# Patient Record
Sex: FEMALE | Race: OTHER | Employment: OTHER | URBAN - METROPOLITAN AREA
[De-identification: names, ages, dates, MRNs, and addresses within clinical notes are randomized per-mention and may not be internally consistent; named-entity substitution may affect disease eponyms.]

---

## 2017-01-19 RX ORDER — INSULIN ASPART 100 [IU]/ML
INJECTION, SOLUTION INTRAVENOUS; SUBCUTANEOUS
Qty: 1 PEN | Refills: 3 | Status: SHIPPED | OUTPATIENT
Start: 2017-01-19 | End: 2017-01-19

## 2017-01-19 NOTE — TELEPHONE ENCOUNTER
Pharm called. She states that a one month supply is 5 pens (that's what pt usually gets) will resend.

## 2017-01-27 RX ORDER — FENOFIBRATE 145 MG/1
TABLET, COATED ORAL
Qty: 30 TABLET | Refills: 2 | Status: SHIPPED | OUTPATIENT
Start: 2017-01-27 | End: 2017-05-17

## 2017-02-19 DIAGNOSIS — E11.9 TYPE 2 DIABETES MELLITUS WITHOUT COMPLICATION (HCC): ICD-10-CM

## 2017-02-24 RX ORDER — GLIMEPIRIDE 1 MG/1
TABLET ORAL
Qty: 90 TABLET | Refills: 0 | Status: SHIPPED | OUTPATIENT
Start: 2017-02-24 | End: 2017-05-22

## 2017-02-24 NOTE — TELEPHONE ENCOUNTER
Please advise on RF  Protocol Criteria:  · Appointment scheduled in the past 6 months or the next 3 months  · A1C < 7.5 in the past 6 months  · Creatinine in the past 12 months  · Creatinine result < 1.5   Recent Visits       Provider Department [facility]

## 2017-02-24 NOTE — TELEPHONE ENCOUNTER
Please advise on RF  Protocol Criteria:  · Appointment scheduled in the past 6 months or in the next 3 months  · BMP or CMP in the past 12 months  · Creatinine result < 2  Recent Visits       Provider Department Primary Dx    7 months ago Katya Dominguez,

## 2017-02-25 RX ORDER — NAPROXEN SODIUM 220 MG
TABLET ORAL
Qty: 100 EACH | Refills: 2 | Status: SHIPPED | OUTPATIENT
Start: 2017-02-25 | End: 2018-01-19

## 2017-02-25 NOTE — TELEPHONE ENCOUNTER
From: Maciel Schulte  To:  Hipolito Talamantes MD  Sent: 2/19/2017 9:38 AM CST  Subject: Medication Renewal Request    Original authorizing provider: MD Maciel Mc would like a refill of the following medications:  Insulin Syringe

## 2017-02-26 RX ORDER — LISINOPRIL 30 MG/1
TABLET ORAL
Qty: 90 TABLET | Refills: 4 | Status: SHIPPED | OUTPATIENT
Start: 2017-02-26 | End: 2018-05-12

## 2017-03-08 RX ORDER — SIMVASTATIN 20 MG
TABLET ORAL
Qty: 30 TABLET | Refills: 0 | Status: SHIPPED | OUTPATIENT
Start: 2017-03-08 | End: 2017-04-13

## 2017-03-08 NOTE — TELEPHONE ENCOUNTER
Appt needed    Cholesterol Medications  Protocol Criteria:  · Appointment scheduled in the past 12 months or in the next 3 months  · ALT & LDL on file in the past 12 months  · ALT result < 80  · LDL result <130   Recent Visits       Provider Department [facility]

## 2017-03-10 RX ORDER — SIMVASTATIN 20 MG
20 TABLET ORAL
Qty: 90 TABLET | Refills: 0 | Status: CANCELLED | OUTPATIENT
Start: 2017-03-10

## 2017-03-10 RX ORDER — AMLODIPINE BESYLATE 5 MG/1
5 TABLET ORAL
Qty: 90 TABLET | Refills: 0 | Status: CANCELLED | OUTPATIENT
Start: 2017-03-10

## 2017-03-10 RX ORDER — AMLODIPINE BESYLATE 5 MG/1
5 TABLET ORAL
Qty: 30 TABLET | Refills: 0 | Status: SHIPPED | OUTPATIENT
Start: 2017-03-10 | End: 2017-03-17

## 2017-03-10 NOTE — TELEPHONE ENCOUNTER
From: Geo Gustafson  To:  Radha Lam MD  Sent: 3/8/2017 10:37 AM CST  Subject: Medication Renewal Request    Original authorizing provider: MD Geo Souza would like a refill of the following medications:  SIMVASTATIN 20 M

## 2017-03-10 NOTE — TELEPHONE ENCOUNTER
Hypertensive Medications Refill protocol failed because the patient did not meet the protocol criteria. 30 day supply of medication sent to pharmacy per protocol. Due for OV and labs.    Protocol Criteria:  · Appointment scheduled in the past 6 months or in

## 2017-03-13 ENCOUNTER — TELEPHONE (OUTPATIENT)
Dept: FAMILY MEDICINE CLINIC | Facility: CLINIC | Age: 62
End: 2017-03-13

## 2017-03-13 DIAGNOSIS — Z00.00 ROUTINE MEDICAL EXAM: Primary | ICD-10-CM

## 2017-03-13 NOTE — TELEPHONE ENCOUNTER
Pt has physical for 4/3 and wanted to know since she is diabetic does she need orders?  Please advise

## 2017-03-14 ENCOUNTER — PATIENT MESSAGE (OUTPATIENT)
Dept: FAMILY MEDICINE CLINIC | Facility: CLINIC | Age: 62
End: 2017-03-14

## 2017-03-14 DIAGNOSIS — I10 ESSENTIAL HYPERTENSION: Primary | ICD-10-CM

## 2017-03-14 RX ORDER — AMLODIPINE BESYLATE 5 MG/1
5 TABLET ORAL
Qty: 90 TABLET | Refills: 0 | Status: CANCELLED | OUTPATIENT
Start: 2017-03-14

## 2017-03-14 NOTE — TELEPHONE ENCOUNTER
From: Shanda Parents  To:  Frederick Soriano MD  Sent: 3/8/2017 12:44 PM CST  Subject: Medication Renewal Request    Original authorizing provider: MD Shanda Bustillos Parents would like a refill of the following medications:  AMLODIPINE BESYL

## 2017-03-17 RX ORDER — AMLODIPINE BESYLATE 5 MG/1
5 TABLET ORAL
Qty: 90 TABLET | Refills: 0 | OUTPATIENT
Start: 2017-03-17 | End: 2017-04-03

## 2017-03-17 NOTE — TELEPHONE ENCOUNTER
See [employer] message for pt. Called [facility] pharmacy and issue is pt requesting 90 day refill on amlodipine 5 mg instead of 30 tablets. 3/10/17 amlodipine 5 mg edited for 90 tablets + 0 refill. Next appt with Dr Joselyn Zee.  Mirza Santana 4/3/17 @ 9:45am for physical.  Mychart me

## 2017-03-21 RX ORDER — AMLODIPINE BESYLATE 5 MG/1
TABLET ORAL
Qty: 90 TABLET | Refills: 0 | Status: SHIPPED | OUTPATIENT
Start: 2017-03-21 | End: 2017-07-14

## 2017-04-01 ENCOUNTER — LAB ENCOUNTER (OUTPATIENT)
Dept: LAB | Age: 62
End: 2017-04-01
Attending: FAMILY MEDICINE
Payer: COMMERCIAL

## 2017-04-01 DIAGNOSIS — Z00.00 ROUTINE MEDICAL EXAM: ICD-10-CM

## 2017-04-01 PROCEDURE — 84443 ASSAY THYROID STIM HORMONE: CPT

## 2017-04-01 PROCEDURE — 83036 HEMOGLOBIN GLYCOSYLATED A1C: CPT

## 2017-04-01 PROCEDURE — 85025 COMPLETE CBC W/AUTO DIFF WBC: CPT

## 2017-04-01 PROCEDURE — 82570 ASSAY OF URINE CREATININE: CPT

## 2017-04-01 PROCEDURE — 82043 UR ALBUMIN QUANTITATIVE: CPT

## 2017-04-01 PROCEDURE — 80061 LIPID PANEL: CPT

## 2017-04-01 PROCEDURE — 36415 COLL VENOUS BLD VENIPUNCTURE: CPT

## 2017-04-01 PROCEDURE — 80053 COMPREHEN METABOLIC PANEL: CPT

## 2017-04-03 ENCOUNTER — OFFICE VISIT (OUTPATIENT)
Dept: FAMILY MEDICINE CLINIC | Facility: CLINIC | Age: 62
End: 2017-04-03

## 2017-04-03 VITALS
HEIGHT: 60 IN | WEIGHT: 222.81 LBS | DIASTOLIC BLOOD PRESSURE: 70 MMHG | HEART RATE: 71 BPM | SYSTOLIC BLOOD PRESSURE: 122 MMHG | BODY MASS INDEX: 43.74 KG/M2

## 2017-04-03 DIAGNOSIS — E78.2 MIXED HYPERLIPIDEMIA: ICD-10-CM

## 2017-04-03 DIAGNOSIS — Z79.4 TYPE 2 DIABETES MELLITUS WITHOUT COMPLICATION, WITH LONG-TERM CURRENT USE OF INSULIN (HCC): ICD-10-CM

## 2017-04-03 DIAGNOSIS — H91.93 BILATERAL HEARING LOSS, UNSPECIFIED HEARING LOSS TYPE: ICD-10-CM

## 2017-04-03 DIAGNOSIS — E11.9 TYPE 2 DIABETES MELLITUS WITHOUT COMPLICATION, WITH LONG-TERM CURRENT USE OF INSULIN (HCC): ICD-10-CM

## 2017-04-03 DIAGNOSIS — I10 ESSENTIAL HYPERTENSION, BENIGN: ICD-10-CM

## 2017-04-03 DIAGNOSIS — Z00.00 ROUTINE MEDICAL EXAM: Primary | ICD-10-CM

## 2017-04-03 PROCEDURE — 99396 PREV VISIT EST AGE 40-64: CPT | Performed by: FAMILY MEDICINE

## 2017-04-03 PROCEDURE — 99213 OFFICE O/P EST LOW 20 MIN: CPT | Performed by: FAMILY MEDICINE
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
## 2017-04-07 ENCOUNTER — APPOINTMENT (OUTPATIENT)
Dept: LAB | Age: 62
End: 2017-04-07
Attending: FAMILY MEDICINE
Payer: COMMERCIAL

## 2017-04-07 DIAGNOSIS — Z00.00 ROUTINE MEDICAL EXAM: ICD-10-CM

## 2017-04-07 PROCEDURE — 82274 ASSAY TEST FOR BLOOD FECAL: CPT

## 2017-04-07 RX ORDER — PANTOPRAZOLE SODIUM 40 MG/1
TABLET, DELAYED RELEASE ORAL
Qty: 90 TABLET | Refills: 0 | Status: SHIPPED | OUTPATIENT
Start: 2017-04-07 | End: 2017-07-05

## 2017-04-10 DIAGNOSIS — Z12.11 SCREEN FOR COLON CANCER: Primary | ICD-10-CM

## 2017-04-11 NOTE — PROGRESS NOTES
Quick Note:    Stool sample was positive for blood so I do recommend you see Gastroenterologist for screening colonoscopy.  I have placed a referral in your chart. - Dr. May Glynn  ______

## 2017-04-17 RX ORDER — AMLODIPINE BESYLATE 5 MG/1
TABLET ORAL
Qty: 30 TABLET | Refills: 0 | OUTPATIENT
Start: 2017-04-17

## 2017-04-17 RX ORDER — SIMVASTATIN 20 MG
TABLET ORAL
Qty: 90 TABLET | Refills: 0 | Status: SHIPPED | OUTPATIENT
Start: 2017-04-17 | End: 2017-07-14

## 2017-04-17 NOTE — TELEPHONE ENCOUNTER
Diabetes Medications; REFILLED PER PROTOCOL.   Protocol Criteria:  · Appointment scheduled in the past 6 months or the next 3 months  · A1C < 7.5 in the past 6 months  · Creatinine in the past 12 months  · Creatinine result < 1.5   Recent Visits       Provi

## 2017-05-18 RX ORDER — FENOFIBRATE 145 MG/1
TABLET, COATED ORAL
Qty: 90 TABLET | Refills: 0 | Status: SHIPPED | OUTPATIENT
Start: 2017-05-18 | End: 2017-08-20

## 2017-05-25 ENCOUNTER — PATIENT MESSAGE (OUTPATIENT)
Dept: FAMILY MEDICINE CLINIC | Facility: CLINIC | Age: 62
End: 2017-05-25

## 2017-05-25 RX ORDER — GLIMEPIRIDE 1 MG/1
TABLET ORAL
Qty: 90 TABLET | Refills: 0 | Status: SHIPPED | OUTPATIENT
Start: 2017-05-25 | End: 2017-08-21

## 2017-05-25 NOTE — TELEPHONE ENCOUNTER
Refilled per written protocol.     Diabetes Medications  Protocol Criteria:  · Appointment scheduled in the past 6 months or the next 3 months  · A1C < 7.5 in the past 6 months  · Creatinine in the past 12 months  · Creatinine result < 1.5   Recent Visits

## 2017-06-03 RX ORDER — GLIMEPIRIDE 1 MG/1
TABLET ORAL
Qty: 90 TABLET | Refills: 0 | Status: CANCELLED | OUTPATIENT
Start: 2017-06-03

## 2017-06-03 NOTE — TELEPHONE ENCOUNTER
From: Martha Mcgraw  To:  Yoana Neves MD  Sent: 5/25/2017 10:39 AM CDT  Subject: Medication Renewal Request    Original authorizing provider: MD Martha Desai would like a refill of the following medications:  GLIMEPIRIDE 1 M

## 2017-06-21 RX ORDER — INSULIN GLARGINE 100 [IU]/ML
INJECTION, SOLUTION SUBCUTANEOUS
Qty: 20 ML | Refills: 2 | Status: SHIPPED | OUTPATIENT
Start: 2017-06-21 | End: 2017-09-12

## 2017-06-27 DIAGNOSIS — E11.9 TYPE 2 DIABETES MELLITUS WITHOUT COMPLICATION, WITHOUT LONG-TERM CURRENT USE OF INSULIN (HCC): ICD-10-CM

## 2017-06-29 NOTE — TELEPHONE ENCOUNTER
From: Gonsalo Carlson  Sent: 6/27/2017 8:36 PM CDT  Subject: Medication Renewal Request    Gonsalo Carlson would like a refill of the following medications:  insulin aspart (NOVOLOG FLEXPEN) 100 UNIT/ML Subcutaneous Solution Pen-injector Paul Simmons

## 2017-06-29 NOTE — TELEPHONE ENCOUNTER
Diabetes Medications: Refilled per protocol    Protocol Criteria:  · Appointment scheduled in the past 6 months or the next 3 months  · A1C < 7.5 in the past 6 months  · Creatinine in the past 12 months  · Creatinine result < 1.5   Recent Outpatient Visits

## 2017-07-06 RX ORDER — PANTOPRAZOLE SODIUM 40 MG/1
TABLET, DELAYED RELEASE ORAL
Qty: 90 TABLET | Refills: 4 | Status: SHIPPED | OUTPATIENT
Start: 2017-07-06 | End: 2018-07-19

## 2017-07-18 RX ORDER — AMLODIPINE BESYLATE 5 MG/1
TABLET ORAL
Qty: 90 TABLET | Refills: 0 | Status: SHIPPED | OUTPATIENT
Start: 2017-07-18 | End: 2017-10-14

## 2017-07-18 RX ORDER — SIMVASTATIN 20 MG
TABLET ORAL
Qty: 90 TABLET | Refills: 0 | Status: SHIPPED | OUTPATIENT
Start: 2017-07-18 | End: 2017-10-14

## 2017-07-18 NOTE — TELEPHONE ENCOUNTER
Chart reviewed. Refills sent per Triage Dept protocol. Hypertensive , Chol, DM Medications, per lab notes,no changes in medications. Patient to continue present management.   Protocol Criteria:  · Appointment scheduled in the past 6 months or in the nex

## 2017-08-23 RX ORDER — GLIMEPIRIDE 1 MG/1
TABLET ORAL
Qty: 90 TABLET | Refills: 0 | Status: CANCELLED
Start: 2017-08-23

## 2017-08-23 RX ORDER — FENOFIBRATE 145 MG/1
TABLET, COATED ORAL
Qty: 90 TABLET | Refills: 0 | Status: SHIPPED | OUTPATIENT
Start: 2017-08-23 | End: 2017-10-11

## 2017-08-24 RX ORDER — GLIMEPIRIDE 1 MG/1
TABLET ORAL
Qty: 90 TABLET | Refills: 0 | Status: SHIPPED | OUTPATIENT
Start: 2017-08-24 | End: 2017-10-21

## 2017-08-24 NOTE — TELEPHONE ENCOUNTER
Refilled per protocol.    Cholesterol Medications  Protocol Criteria:  · Appointment scheduled in the past 12 months or in the next 3 months  · ALT & LDL on file in the past 12 months  · ALT result < 80  · LDL result <130   Recent Outpatient Visits

## 2017-08-24 NOTE — TELEPHONE ENCOUNTER
Chart reviewed, glimepiride 1 mg daily refilled for 3 months.     Diabetes Medications  Protocol Criteria:  · Appointment scheduled in the past 6 months or the next 3 months  · A1C < 7.5 in the past 6 months  · Creatinine in the past 12 months  · Creatinine

## 2017-08-24 NOTE — TELEPHONE ENCOUNTER
From: Lisa Drake  Sent: 8/23/2017 10:46 AM CDT  Subject: Medication Renewal Request    Lisa Drake would like a refill of the following medications:  GLIMEPIRIDE 1 MG Oral Tab Yasmine Avilez MD]    Preferred pharmacy: [facility] [id number] [place] [facility]

## 2017-09-12 RX ORDER — INSULIN GLARGINE 100 [IU]/ML
INJECTION, SOLUTION SUBCUTANEOUS
Qty: 20 ML | Refills: 2 | Status: SHIPPED | OUTPATIENT
Start: 2017-09-12 | End: 2017-10-21

## 2017-09-20 ENCOUNTER — OFFICE VISIT (OUTPATIENT)
Dept: FAMILY MEDICINE CLINIC | Facility: CLINIC | Age: 62
End: 2017-09-20

## 2017-09-20 VITALS
DIASTOLIC BLOOD PRESSURE: 83 MMHG | SYSTOLIC BLOOD PRESSURE: 134 MMHG | HEART RATE: 75 BPM | BODY MASS INDEX: 43 KG/M2 | WEIGHT: 220.38 LBS

## 2017-09-20 DIAGNOSIS — Z79.4 TYPE 2 DIABETES MELLITUS WITHOUT COMPLICATION, WITH LONG-TERM CURRENT USE OF INSULIN (HCC): Primary | ICD-10-CM

## 2017-09-20 DIAGNOSIS — I10 ESSENTIAL HYPERTENSION, BENIGN: ICD-10-CM

## 2017-09-20 DIAGNOSIS — E11.9 TYPE 2 DIABETES MELLITUS WITHOUT COMPLICATION, WITH LONG-TERM CURRENT USE OF INSULIN (HCC): Primary | ICD-10-CM

## 2017-09-20 DIAGNOSIS — M62.838 NECK MUSCLE SPASM: ICD-10-CM

## 2017-09-20 PROCEDURE — 99212 OFFICE O/P EST SF 10 MIN: CPT | Performed by: FAMILY MEDICINE

## 2017-09-20 PROCEDURE — 90471 IMMUNIZATION ADMIN: CPT | Performed by: FAMILY MEDICINE

## 2017-09-20 PROCEDURE — 99213 OFFICE O/P EST LOW 20 MIN: CPT | Performed by: FAMILY MEDICINE

## 2017-09-20 PROCEDURE — 90686 IIV4 VACC NO PRSV 0.5 ML IM: CPT | Performed by: FAMILY MEDICINE

## 2017-09-20 RX ORDER — NAPROXEN 500 MG/1
500 TABLET ORAL 2 TIMES DAILY WITH MEALS
Qty: 20 TABLET | Refills: 0 | Status: SHIPPED | OUTPATIENT
Start: 2017-09-20 | End: 2017-10-04

## 2017-09-20 RX ORDER — LIDOCAINE 50 MG/G
1 PATCH TOPICAL EVERY 24 HOURS
Qty: 10 PATCH | Refills: 0 | Status: SHIPPED | OUTPATIENT
Start: 2017-09-20 | End: 2017-10-25 | Stop reason: ALTCHOICE

## 2017-09-20 RX ORDER — CYCLOBENZAPRINE HCL 5 MG
5 TABLET ORAL 3 TIMES DAILY PRN
Qty: 20 TABLET | Refills: 0 | Status: SHIPPED | OUTPATIENT
Start: 2017-09-20 | End: 2017-10-04

## 2017-09-20 NOTE — PROGRESS NOTES
Alivia Sharif is a 57year old female. Patient presents with:  Neck Pain    HPI:   Reports injury to neck over 30 years ago when fell down stairs. Had horrible pain at the time. Reports for past 2 weeks having a lot of stiffness and unable to move it. NOSTRIL DAILY Disp: 1 Inhaler Rfl: 4   Meclizine HCl (ANTIVERT) 12.5 MG Oral Tab Take 1 tablet (12.5 mg total) by mouth 3 (three) times daily as needed.  Disp: 90 tablet Rfl: 1   VITAMIN D3 SUPER STRENGTH 2000 UNITS Oral Tab TAKE ONE TABLET BY MOUTH ONE KARLI use of insulin ([facility] [address]      2. Essential hypertension, benign      3. Neck muscle spasm  Flexeril in  Evenings. lidoderm patches.   - PHYSICAL THERAPY - INTERNAL          The patient indicates understanding of these issues and agrees to the plan.       Dale Aldridge

## 2017-10-02 ENCOUNTER — NURSE TRIAGE (OUTPATIENT)
Dept: OTHER | Age: 62
End: 2017-10-02

## 2017-10-02 PROBLEM — H26.9 INCIPIENT CATARACT OF LEFT EYE: Status: ACTIVE | Noted: 2017-10-02

## 2017-10-02 PROBLEM — E11.9 TYPE 2 DIABETES MELLITUS WITHOUT COMPLICATION, WITHOUT LONG-TERM CURRENT USE OF INSULIN (HCC): Status: ACTIVE | Noted: 2017-10-02

## 2017-10-02 PROBLEM — H04.123 DRY EYE SYNDROME OF BILATERAL LACRIMAL GLANDS: Status: ACTIVE | Noted: 2017-10-02

## 2017-10-02 PROBLEM — H10.13 ALLERGIC CONJUNCTIVITIS OF BOTH EYES: Status: ACTIVE | Noted: 2017-10-02

## 2017-10-02 PROBLEM — H40.033 ANATOMICAL NARROW ANGLE OF BOTH EYES: Status: ACTIVE | Noted: 2017-10-02

## 2017-10-02 NOTE — TELEPHONE ENCOUNTER
Action Requested: Summary for Provider     []  Critical Lab, Recommendations Needed  [x] Need Additional Advice  []   FYI    []   Need Orders  [] Need Medications Sent to Pharmacy  []  Other     SUMMARY: requesting Appt tomorrow-Only wants to see You -thin

## 2017-10-04 ENCOUNTER — OFFICE VISIT (OUTPATIENT)
Dept: FAMILY MEDICINE CLINIC | Facility: CLINIC | Age: 62
End: 2017-10-04

## 2017-10-04 VITALS
HEART RATE: 74 BPM | WEIGHT: 216.38 LBS | SYSTOLIC BLOOD PRESSURE: 132 MMHG | TEMPERATURE: 100 F | BODY MASS INDEX: 42 KG/M2 | DIASTOLIC BLOOD PRESSURE: 84 MMHG

## 2017-10-04 DIAGNOSIS — K57.92 DIVERTICULITIS OF INTESTINE WITHOUT BLEEDING, UNSPECIFIED COMPLICATION STATUS, UNSPECIFIED PART OF INTESTINAL TRACT: Primary | ICD-10-CM

## 2017-10-04 PROCEDURE — 99214 OFFICE O/P EST MOD 30 MIN: CPT | Performed by: FAMILY MEDICINE

## 2017-10-04 PROCEDURE — 99212 OFFICE O/P EST SF 10 MIN: CPT | Performed by: FAMILY MEDICINE

## 2017-10-04 RX ORDER — MOXIFLOXACIN HYDROCHLORIDE 400 MG/1
400 TABLET ORAL DAILY
Qty: 10 TABLET | Refills: 0 | Status: SHIPPED | OUTPATIENT
Start: 2017-10-04 | End: 2017-10-16

## 2017-10-04 NOTE — PROGRESS NOTES
Shanda Chilel is a 57year old female. Patient presents with:  Abdominal Pain  Bloating    HPI:   3 days with right lower abdominal pain. Concerned it could be diverticulitis - last time she had a reaction was 3 years ago.   No nausea but has been eating SPRAYS IN EACH NOSTRIL DAILY Disp: 1 Inhaler Rfl: 4   Meclizine HCl (ANTIVERT) 12.5 MG Oral Tab Take 1 tablet (12.5 mg total) by mouth 3 (three) times daily as needed.  Disp: 90 tablet Rfl: 1   VITAMIN D3 SUPER STRENGTH 2000 UNITS Oral Tab TAKE ONE TABLET B murmur  GI: good BS's,no masses, HSM pos tenderness in right mid abdomen near umbilicus - no tenderness in right lower quadrant. No rebound or guarding   EXTREMITIES: no cyanosis, clubbing or edema    ASSESSMENT AND PLAN:   1.  Diverticulitis of intestine w

## 2017-10-13 ENCOUNTER — NURSE TRIAGE (OUTPATIENT)
Dept: OTHER | Age: 62
End: 2017-10-13

## 2017-10-13 RX ORDER — FENOFIBRATE 145 MG/1
145 TABLET, COATED ORAL
Qty: 90 TABLET | Refills: 0 | Status: SHIPPED | OUTPATIENT
Start: 2017-10-13 | End: 2017-10-14

## 2017-10-13 NOTE — TELEPHONE ENCOUNTER
From: Elizabeth James  Sent: 10/11/2017 12:02 PM CDT  Subject: Medication Renewal Request    Elizabeth James would like a refill of the following medications:     FENOFIBRATE 145 MG Oral Tab Revonda Kendal Nick Guerrero MD]    Preferred pharmacy: [address]

## 2017-10-13 NOTE — TELEPHONE ENCOUNTER
Dr. Fatemeh Mohr for Dr. Fritz Vincent (out of office) please advise on Rx request thank you.    Action Requested: Summary for Provider     []  Critical Lab, Recommendations Needed  [x] Need Additional Advice  []   FYI    []   Need Orders  [] Need Medications Sent to Phar

## 2017-10-13 NOTE — TELEPHONE ENCOUNTER
No, I do not feel comfortable adding another antibiotic. Go ahead and send this to Dr. Da Monge to see if she will get her worked in or handle this. Cheri Durant

## 2017-10-13 NOTE — TELEPHONE ENCOUNTER
Cholesterol Medications  Protocol Criteria:  · Appointment scheduled in the past 12 months or in the next 3 months  · ALT & LDL on file in the past 12 months  · ALT result < 80  · LDL result <130   Recent Outpatient Visits            1 week ago Yennifer Wong

## 2017-10-13 NOTE — TELEPHONE ENCOUNTER
Signed Prescriptions Disp Refills    Fenofibrate 145 MG Oral Tab 90 tablet 0      Sig: Take 1 tablet (145 mg total) by mouth once daily.         Authorizing Provider: Rebecca Presley        Ordering User: Kailee Dang           Refill approved per dinah

## 2017-10-13 NOTE — TELEPHONE ENCOUNTER
Pt informed of VS' response below and pt willing to wait for response from [facility] Monday. I noted there was a slot available (RN approval) with [facility] for 10/16 at 11:30 am and pt will take it.  If that time does not work for her she will call back to let us know t

## 2017-10-14 RX ORDER — SIMVASTATIN 20 MG
TABLET ORAL
Qty: 90 TABLET | Refills: 1 | Status: SHIPPED | OUTPATIENT
Start: 2017-10-14 | End: 2018-04-22

## 2017-10-14 RX ORDER — AMLODIPINE BESYLATE 5 MG/1
TABLET ORAL
Qty: 90 TABLET | Refills: 1 | Status: SHIPPED | OUTPATIENT
Start: 2017-10-14 | End: 2018-04-17

## 2017-10-14 RX ORDER — FENOFIBRATE 145 MG/1
TABLET, COATED ORAL
Qty: 90 TABLET | Refills: 0 | Status: SHIPPED | OUTPATIENT
Start: 2017-10-14 | End: 2018-01-04

## 2017-10-14 NOTE — TELEPHONE ENCOUNTER
Signed Prescriptions Disp Refills    AMLODIPINE BESYLATE 5 MG Oral Tab 90 tablet 1      Sig: TAKE ONE TABLET BY MOUTH ONE TIME DAILY        Authorizing Provider: Macrela DAVID        Ordering User: Bhavesh Garcia      SIMVASTATIN 20 MG Oral Tab 90 ta 150 Gee Bowden  colonscopy          Lab Results  Component Value Date   A1C 6.9 (H) 04/01/2017       Lab Results  Component Value Date   CREATSERUM 1.00 04/01/2017         Hypertensive Medications  Protocol Criteria:  · Appointment s GLOBULIN 2.7 04/01/2017   AGRATIO 1.3 07/16/2016   ANIONGAP 6 04/01/2017   OSMOCALC 293 04/01/2017           Cholesterol Medications  Protocol Criteria:  · Appointment scheduled in the past 12 months or in the next 3 months  · ALT & LDL on file in the pa

## 2017-10-14 NOTE — TELEPHONE ENCOUNTER
Pt to start probiotics like culturelle or align otc - it may be the side effect from antibiotic use. If pain returns, will send for CT scan.  See me on Monday

## 2017-10-16 ENCOUNTER — TELEPHONE (OUTPATIENT)
Dept: OTHER | Age: 62
End: 2017-10-16

## 2017-10-16 ENCOUNTER — HOSPITAL ENCOUNTER (OUTPATIENT)
Dept: CT IMAGING | Age: 62
Discharge: HOME | End: 2017-10-16
Attending: FAMILY MEDICINE
Payer: COMMERCIAL

## 2017-10-16 ENCOUNTER — OFFICE VISIT (OUTPATIENT)
Dept: FAMILY MEDICINE CLINIC | Facility: CLINIC | Age: 62
End: 2017-10-16

## 2017-10-16 VITALS
HEART RATE: 70 BPM | DIASTOLIC BLOOD PRESSURE: 80 MMHG | SYSTOLIC BLOOD PRESSURE: 121 MMHG | BODY MASS INDEX: 42.8 KG/M2 | HEIGHT: 60 IN | WEIGHT: 218 LBS

## 2017-10-16 DIAGNOSIS — Z87.19 HISTORY OF DIVERTICULITIS OF COLON: ICD-10-CM

## 2017-10-16 DIAGNOSIS — N94.89 ADNEXAL MASS: Primary | ICD-10-CM

## 2017-10-16 DIAGNOSIS — R10.31 RIGHT LOWER QUADRANT ABDOMINAL PAIN: Primary | ICD-10-CM

## 2017-10-16 DIAGNOSIS — R10.31 RIGHT LOWER QUADRANT ABDOMINAL PAIN: ICD-10-CM

## 2017-10-16 PROCEDURE — 99214 OFFICE O/P EST MOD 30 MIN: CPT | Performed by: FAMILY MEDICINE

## 2017-10-16 PROCEDURE — 99212 OFFICE O/P EST SF 10 MIN: CPT | Performed by: FAMILY MEDICINE

## 2017-10-16 PROCEDURE — 74177 CT ABD & PELVIS W/CONTRAST: CPT | Performed by: FAMILY MEDICINE

## 2017-10-16 NOTE — PROGRESS NOTES
Lisa Drake is a 57year old female.  Patient presents with:  GI Bleeding (gastrointestinal): follow up. symptoms started again 10/12  Back Pain: R.  10/12     HPI:   Pt reports original pain had improved but then pain returned 10/12 - finished antibio daily. Disp: 100 each Rfl: 2   Insulin Pen Needle (NOVOFINE) 32G X 6 MM Does not apply Misc Test fingerstick blood sugar four times daily.  Disp: 100 each Rfl: 2   NOVOFINE 32G X 6 MM Does not apply Misc USE 4 TIMES DAILY Disp: 100 each Rfl: 3   alprazolam and pos heartburn  NEURO: denies headaches  Musculoskeletal: no joint pain, back pain    EXAM:   /80 (BP Location: Left arm, Patient Position: Sitting, Cuff Size: large)   Pulse 70   Ht 5' (1.524 m)   Wt 218 lb (98.9 kg)   BMI 42.58 kg/m²   GENERAL:

## 2017-10-16 NOTE — PROGRESS NOTES
CT shows no acute diverticulitis but does show growth on right ovary. I have ordered a MRI of the pelvis. You can call [phone].

## 2017-10-16 NOTE — TELEPHONE ENCOUNTER
Spoke with pt and verified pt . Pt states she had tried to communicate earlier with Dr Virginia Reis regarding results, but that Dr Virginia Reis told her she was leaving for the day. Pt is asking if Dr Virginia Reis can call her tomorrow when she returns to the office.    Jaime Moura

## 2017-10-18 NOTE — TELEPHONE ENCOUNTER
Dr Virginia Reis, was this addressed? Pt has abnormal CT abdomen results. Tried to reach pt to make sure that she was able to get her questions answered, no answer and no voicemail.

## 2017-10-19 NOTE — TELEPHONE ENCOUNTER
Notes Recorded by Aleta Morgan MD on 10/16/2017 at 4:11 PM CDT  CT shows no acute diverticulitis but does show growth on right ovary. I have ordered a MRI of the pelvis.  You can call [phone].  ------    Notes Recorded by Aleta Morgan MD on 1

## 2017-10-20 ENCOUNTER — NURSE TRIAGE (OUTPATIENT)
Dept: OTHER | Age: 62
End: 2017-10-20

## 2017-10-20 NOTE — TELEPHONE ENCOUNTER
Action Requested: Summary for Provider     []  Critical Lab, Recommendations Needed  [x] Need Additional Advice  []   FYI    []   Need Orders  [] Need Medications Sent to Pharmacy  []  Other     SUMMARY: Patient placed in appt with [facility] 10/21/17 - Declined today small amount of blood on toilet paper)  Are these symptoms new, recurrent, or chronic?:  (Urination issues started today but has been having issues with blood glucose levels for over two weeks )  Precipitated by: no precipitating factors  Aggravated

## 2017-10-20 NOTE — TELEPHONE ENCOUNTER
----- Message from David Terry sent at 10/20/2017  4:33 AM CDT -----  Regarding: Other  Contact: [phone]  , I have yet another problem. I woke up this morning with the chills and rapidly dropping blood sugars .  2:00am I had an English muf

## 2017-10-21 ENCOUNTER — OFFICE VISIT (OUTPATIENT)
Dept: FAMILY MEDICINE CLINIC | Facility: CLINIC | Age: 62
End: 2017-10-21

## 2017-10-21 VITALS
SYSTOLIC BLOOD PRESSURE: 133 MMHG | DIASTOLIC BLOOD PRESSURE: 77 MMHG | HEART RATE: 73 BPM | WEIGHT: 215 LBS | TEMPERATURE: 98 F | BODY MASS INDEX: 42 KG/M2

## 2017-10-21 DIAGNOSIS — E11.9 TYPE 2 DIABETES MELLITUS WITHOUT COMPLICATION, WITH LONG-TERM CURRENT USE OF INSULIN (HCC): Primary | ICD-10-CM

## 2017-10-21 DIAGNOSIS — E16.2 HYPOGLYCEMIA: ICD-10-CM

## 2017-10-21 DIAGNOSIS — Z79.4 TYPE 2 DIABETES MELLITUS WITHOUT COMPLICATION, WITH LONG-TERM CURRENT USE OF INSULIN (HCC): Primary | ICD-10-CM

## 2017-10-21 DIAGNOSIS — R10.31 RIGHT LOWER QUADRANT ABDOMINAL PAIN: ICD-10-CM

## 2017-10-21 PROCEDURE — 99212 OFFICE O/P EST SF 10 MIN: CPT | Performed by: FAMILY MEDICINE

## 2017-10-21 PROCEDURE — 99214 OFFICE O/P EST MOD 30 MIN: CPT | Performed by: FAMILY MEDICINE

## 2017-10-21 PROCEDURE — 81003 URINALYSIS AUTO W/O SCOPE: CPT | Performed by: FAMILY MEDICINE

## 2017-10-21 RX ORDER — ALPRAZOLAM 0.25 MG/1
TABLET ORAL
Qty: 30 TABLET | Refills: 5 | Status: SHIPPED | OUTPATIENT
Start: 2017-10-21 | End: 2019-10-08

## 2017-10-21 NOTE — PROGRESS NOTES
Early sateity  Getting low blood sugars.   Sometimes I don't need metformin  Sometimes I need to eat middle of the night will go down to 66 or 70  Sometimes I have to skip the lantus    appetite way down    Has mass right adnexa  Had hyst in 1983 or so  Has

## 2017-10-23 ENCOUNTER — HOSPITAL ENCOUNTER (OUTPATIENT)
Dept: MRI IMAGING | Age: 62
Discharge: HOME | End: 2017-10-23
Attending: FAMILY MEDICINE
Payer: COMMERCIAL

## 2017-10-23 DIAGNOSIS — N94.89 ADNEXAL MASS: ICD-10-CM

## 2017-10-23 PROCEDURE — 72197 MRI PELVIS W/O & W/DYE: CPT | Performed by: FAMILY MEDICINE

## 2017-10-23 PROCEDURE — 82565 ASSAY OF CREATININE: CPT

## 2017-10-23 PROCEDURE — A9575 INJ GADOTERATE MEGLUMI 0.1ML: HCPCS | Performed by: FAMILY MEDICINE

## 2017-10-25 ENCOUNTER — PATIENT MESSAGE (OUTPATIENT)
Dept: FAMILY MEDICINE CLINIC | Facility: CLINIC | Age: 62
End: 2017-10-25

## 2017-10-25 ENCOUNTER — TELEPHONE (OUTPATIENT)
Dept: OBGYN CLINIC | Facility: CLINIC | Age: 62
End: 2017-10-25

## 2017-10-25 ENCOUNTER — OFFICE VISIT (OUTPATIENT)
Dept: FAMILY MEDICINE CLINIC | Facility: CLINIC | Age: 62
End: 2017-10-25

## 2017-10-25 ENCOUNTER — LAB ENCOUNTER (OUTPATIENT)
Dept: LAB | Age: 62
End: 2017-10-25
Attending: FAMILY MEDICINE
Payer: COMMERCIAL

## 2017-10-25 VITALS
WEIGHT: 215 LBS | BODY MASS INDEX: 42 KG/M2 | DIASTOLIC BLOOD PRESSURE: 80 MMHG | SYSTOLIC BLOOD PRESSURE: 127 MMHG | HEART RATE: 76 BPM

## 2017-10-25 DIAGNOSIS — N83.8 OVARIAN MASS: Primary | ICD-10-CM

## 2017-10-25 DIAGNOSIS — N83.8 OVARIAN MASS: ICD-10-CM

## 2017-10-25 PROCEDURE — 36415 COLL VENOUS BLD VENIPUNCTURE: CPT

## 2017-10-25 PROCEDURE — 86304 IMMUNOASSAY TUMOR CA 125: CPT

## 2017-10-25 PROCEDURE — 99212 OFFICE O/P EST SF 10 MIN: CPT | Performed by: FAMILY MEDICINE

## 2017-10-25 PROCEDURE — 99213 OFFICE O/P EST LOW 20 MIN: CPT | Performed by: FAMILY MEDICINE

## 2017-10-25 NOTE — TELEPHONE ENCOUNTER
Mariana Quiros called and asked that pt be seen at [facility] tomorrow with [address]. Pt had seen Dr. Zhao River. Appt made at [place] with JOSE for 10:20 AM. Pt needs to be informed.

## 2017-10-25 NOTE — PROGRESS NOTES
Nancy Jansen is a 57year old female. Patient presents with:  Diabetes: follow up medication changed by Dr. Tedi Sandifer     HPI:   Pt reports continued pain in right lower quadrant. Reports it comes and goes.    Reports all these office visits and imagin (three) times daily as needed. Disp: 90 tablet Rfl: 1   VITAMIN D3 SUPER STRENGTH 2000 UNITS Oral Tab TAKE ONE TABLET BY MOUTH ONE TIME DAILY  Disp: 30 tablet Rfl: 11   Vitamin B-12 (VITAMIN B12) 1000 MCG Oral Tab Take 1 tablet by mouth once daily.  Disp: 3 today   - -II; Future  - OBG - INTERNAL        The patient indicates understanding of these issues and agrees to the plan.       Rachael Cheek MD  10/25/2017  4:40 PM

## 2017-10-26 ENCOUNTER — OFFICE VISIT (OUTPATIENT)
Dept: OBGYN CLINIC | Facility: CLINIC | Age: 62
End: 2017-10-26

## 2017-10-26 ENCOUNTER — TELEPHONE (OUTPATIENT)
Dept: OBGYN CLINIC | Facility: CLINIC | Age: 62
End: 2017-10-26

## 2017-10-26 ENCOUNTER — APPOINTMENT (OUTPATIENT)
Dept: LAB | Age: 62
End: 2017-10-26
Attending: FAMILY MEDICINE
Payer: COMMERCIAL

## 2017-10-26 VITALS — HEART RATE: 71 BPM | SYSTOLIC BLOOD PRESSURE: 112 MMHG | DIASTOLIC BLOOD PRESSURE: 69 MMHG

## 2017-10-26 DIAGNOSIS — N83.299 OVARIAN CYST, COMPLEX: Primary | ICD-10-CM

## 2017-10-26 DIAGNOSIS — Z01.818 PREOPERATIVE CLEARANCE: Primary | ICD-10-CM

## 2017-10-26 PROCEDURE — 99244 OFF/OP CNSLTJ NEW/EST MOD 40: CPT | Performed by: OBSTETRICS & GYNECOLOGY

## 2017-10-26 NOTE — PROGRESS NOTES
Tests are all normal. This is not definitive though and still would need to have mass removed for pathology.

## 2017-10-26 NOTE — TELEPHONE ENCOUNTER
Yes he wishes to do on a Saturday -- surgerical schedulers gone for day.  Will send them a message when back

## 2017-10-26 NOTE — TELEPHONE ENCOUNTER
----- Message from Jazlyn Guy sent at 10/26/2017  5:01 PM CDT -----  Regarding: Visit Follow-up Question  Contact: [phone]  Isaac Reardon,  Were you able to get a hold of Dr Rao Case regarding setting up surgery?   Thanks

## 2017-10-26 NOTE — TELEPHONE ENCOUNTER
From: Maureen Jonas  To: Randee Friedman MD  Sent: 10/25/2017  8:32 PM CDT  Subject: Other    Dr Corrie Snell If I’m having surgery do I need to get all my lab work ,blood test done,before surgery?     Addressed at [address] [facility] on 10/25/17 at 4:15 pm

## 2017-10-28 ENCOUNTER — PATIENT MESSAGE (OUTPATIENT)
Dept: FAMILY MEDICINE CLINIC | Facility: CLINIC | Age: 62
End: 2017-10-28

## 2017-10-29 ENCOUNTER — TELEPHONE (OUTPATIENT)
Dept: OBGYN CLINIC | Facility: CLINIC | Age: 62
End: 2017-10-29

## 2017-10-30 RX ORDER — AMLODIPINE BESYLATE 5 MG/1
5 TABLET ORAL
Qty: 90 TABLET | Refills: 1
Start: 2017-10-30

## 2017-10-30 RX ORDER — AMLODIPINE BESYLATE 5 MG/1
5 TABLET ORAL
Qty: 90 TABLET | Refills: 1 | Status: CANCELLED
Start: 2017-10-30

## 2017-10-30 RX ORDER — SIMVASTATIN 20 MG
20 TABLET ORAL
Qty: 90 TABLET | Refills: 1 | Status: CANCELLED
Start: 2017-10-30

## 2017-10-30 NOTE — TELEPHONE ENCOUNTER
From: Chandni Travis  Sent: 10/30/2017 12:43 PM CDT  Subject: Medication Renewal Request    Chandni Travis would like a refill of the following medications:     AMLODIPINE BESYLATE 5 MG Oral Tab Roya Forbes MD]    Preferred pharmacy: [facility] [id number] [place]

## 2017-10-30 NOTE — PROGRESS NOTES
Tomi Jones is a 57year old female  No LMP recorded. Patient is not currently having periods (Reason: Partial Hysterectomy). Patient presents with:  Gyn Problem: TEST F/UP REFERED BY DR CORDERO.  TVH for large fibroids & heavy periods by Dr. Neena Holley SURGICAL HISTORY      Comment: adenoma removal of left breast  No date: YAG IRIDECTOMY - OU - BOTH EYES      Comment: OS 5/10/12, OD 12  Obstetric History     T1    L1    SAB0  TAB0  Ectopic0  Multiple0  Live Births1     Comment: menopausa four times daily. , Disp: 100 each, Rfl: 2  •  Insulin Pen Needle (NOVOFINE) 32G X 6 MM Does not apply Misc, Test fingerstick blood sugar four times daily. , Disp: 100 each, Rfl: 2  •  NOVOFINE 32G X 6 MM Does not apply Misc, USE 4 TIMES DAILY, Disp: 100 eac masses (+) tender  Skin/Hair: no unusual rashes or bruises  Extremities: no edema, no cyanosis  Psychiatric:  Oriented to time, place, person and situation.  Appropriate mood and affect    Pelvic Exam:  External Genitalia: normal appearance, hair distributi

## 2017-10-30 NOTE — TELEPHONE ENCOUNTER
From: Geo Gustafson  Sent: 10/30/2017 9:52 AM CDT  Subject: Medication Renewal Request    Geo Gustafson would like a refill of the following medications:     AMLODIPINE BESYLATE 5 MG Oral Tab Julio César Real MD]     SIMVASTATIN 20 MG Oral Tab [Gomez

## 2017-10-30 NOTE — TELEPHONE ENCOUNTER
Chart reviewed, on 10/14/17 both amlodipine 5 mg and simvastatin 20 mg refilled by Rosario Nguyen for quantity 90+1 additional refill and sent to [facility] in [place]. Currently request denied. Message sent to pt via active [employer].     Hypertensive Medicati past 12 months or in the next 3 months  · ALT & LDL on file in the past 12 months  · ALT result < 80  · LDL result <130   Recent Outpatient Visits            4 days ago Ovarian cyst, complex    [address], [facility], Gee Torre MD    Of

## 2017-10-30 NOTE — TELEPHONE ENCOUNTER
Spoke to Dr. Carvalho [facility] surgery scheduler and gave tentative dates for procedure, awaiting a confirmation to proceed with scheduling.

## 2017-10-30 NOTE — TELEPHONE ENCOUNTER
See other encounter 10/30/17. I called [facility] and she has there 2 erx on file for amlodipine 5mg and simvastatin 20mg. She will proceed to process and call pt.

## 2017-10-30 NOTE — TELEPHONE ENCOUNTER
Called [facility] spoke to [facility] who confirmed no prior Jerod Blochaz is required for outpatient surgery ref# [id number].

## 2017-10-30 NOTE — TELEPHONE ENCOUNTER
Please schedule the following surgery:    Procedure: Laparascopic BSO, possible staging, possible laparatomy    Date: Saturday Nov 4th 0730 case otherwise Nov 18th w/ Dr. Dewayne Neri -- as his office if available Nov 10th Friday -- (per Trav any Saturday)

## 2017-10-30 NOTE — TELEPHONE ENCOUNTER
Patient is scheduled 11/4/17 9:15a BSO JOSE/Trav. Pat orders routed. Instructions routed via [employer]. Patient informed.

## 2017-10-30 NOTE — TELEPHONE ENCOUNTER
From: Martha Mcgraw  To: Yoana Neves MD  Sent: 10/28/2017 8:26 AM CDT  Subject: Test Results Question    Hi Dr Chadd Winkler,  I was wondering when you think the results of the blood work and EKG I had done on Thursday will be in?  I haven’t heard from Dr Mariangel Levy

## 2017-10-31 ENCOUNTER — TELEPHONE (OUTPATIENT)
Dept: OTHER | Age: 62
End: 2017-10-31

## 2017-10-31 NOTE — TELEPHONE ENCOUNTER
----- Message from Rachael Cheek MD sent at 10/31/2017  2:42 PM CDT -----  Add my schedule for pre operative clearance tomorrow or Thursday. ekg stable.

## 2017-11-01 ENCOUNTER — TELEPHONE (OUTPATIENT)
Dept: OTHER | Age: 62
End: 2017-11-01

## 2017-11-01 ENCOUNTER — OFFICE VISIT (OUTPATIENT)
Dept: FAMILY MEDICINE CLINIC | Facility: CLINIC | Age: 62
End: 2017-11-01

## 2017-11-01 VITALS
HEART RATE: 70 BPM | SYSTOLIC BLOOD PRESSURE: 120 MMHG | DIASTOLIC BLOOD PRESSURE: 73 MMHG | BODY MASS INDEX: 41 KG/M2 | WEIGHT: 210.81 LBS

## 2017-11-01 DIAGNOSIS — I10 ESSENTIAL HYPERTENSION, BENIGN: ICD-10-CM

## 2017-11-01 DIAGNOSIS — R07.9 CHEST PAIN, UNSPECIFIED TYPE: ICD-10-CM

## 2017-11-01 DIAGNOSIS — R94.31 ABNORMAL EKG: ICD-10-CM

## 2017-11-01 DIAGNOSIS — Z79.4 TYPE 2 DIABETES MELLITUS WITHOUT COMPLICATION, WITH LONG-TERM CURRENT USE OF INSULIN (HCC): Primary | ICD-10-CM

## 2017-11-01 DIAGNOSIS — E11.9 TYPE 2 DIABETES MELLITUS WITHOUT COMPLICATION, WITH LONG-TERM CURRENT USE OF INSULIN (HCC): Primary | ICD-10-CM

## 2017-11-01 DIAGNOSIS — Z01.818 PREOPERATIVE CLEARANCE: ICD-10-CM

## 2017-11-01 PROCEDURE — 99212 OFFICE O/P EST SF 10 MIN: CPT | Performed by: FAMILY MEDICINE

## 2017-11-01 PROCEDURE — 99244 OFF/OP CNSLTJ NEW/EST MOD 40: CPT | Performed by: FAMILY MEDICINE

## 2017-11-01 NOTE — PROGRESS NOTES
HPI:   Shanda Chilel is a 57year old female who presents for a complete physical exam for preoperative clearance per request of Dr Rina Carvajal.      Here for cardiac/preoperative clearance for surgery on Monday 11/6/2017 for bilateral salpingo-oophorectomy pos sugar four times daily.  Disp: 100 each Rfl: 2   NOVOFINE 32G X 6 MM Does not apply Misc USE 4 TIMES DAILY Disp: 100 each Rfl: 3   FLUTICASONE PROPIONATE 50 MCG/ACT Nasal Suspension USE TWO SPRAYS IN EACH NOSTRIL DAILY Disp: 1 Inhaler Rfl: 4   VITAMIN D3 CRUZ Cataracts Mother    • Lipids Sister    • Lipids Sister    • Lipids Sister    • Lipids Sister    • Cancer Paternal Grandmother      uterine cancer   • Colon Cancer Paternal Uncle    • Diabetes Sister    • Hypertension Sister       Social History:   Smoking complication, with long-term current use of insulin (HCC)  Stable. Recent labs hgb a1c <7  Has been having more hypoglycemia since decreased appetite so has cut back on her insulin. Will hold completely and no meds day before surgery since will be fasting.

## 2017-11-01 NOTE — TELEPHONE ENCOUNTER
Tasked to manage care for preauthorization of stress test lexican. Pt to complete test tomorrow or Friday. Pre-op testing clearance. Please contact pt when approve so she can proceed with test appt asap.

## 2017-11-02 ENCOUNTER — TELEPHONE (OUTPATIENT)
Dept: OTHER | Age: 62
End: 2017-11-02

## 2017-11-02 RX ORDER — FLUTICASONE PROPIONATE 50 MCG
SPRAY, SUSPENSION (ML) NASAL
Qty: 1 INHALER | Refills: 4 | Status: SHIPPED | OUTPATIENT
Start: 2017-11-02 | End: 2018-02-26

## 2017-11-02 NOTE — TELEPHONE ENCOUNTER
From: Elizabeth James  Sent: 10/31/2017 7:54 PM CDT  Subject: Medication Renewal Request    Elizabeth James would like a refill of the following medications:     FLUTICASONE PROPIONATE 50 MCG/ACT Nasal Suspension onda Kendal Guerrero MD]    Preferred pharm

## 2017-11-02 NOTE — TELEPHONE ENCOUNTER
Refill Protocol Appointment Criteria  · Appointment scheduled in the past 12 months or in the next 3 months  Recent Outpatient Visits            1 week ago Ovarian cyst, complex    [facility], [facility], [address], Richard Leon MD    Office Visit

## 2017-11-02 NOTE — TELEPHONE ENCOUNTER
The test is early enough that should be ok. The meds will start to kick in more in afternoon time. You can try calling to see if opening today for her. But needs to be done today or tomorrow for clearance.  Surgery in Saturday

## 2017-11-02 NOTE — TELEPHONE ENCOUNTER
Pt concerned that she will not be able to do cardiac nuc stress test on Friday because that is the day she starts her morning prep for her surgery on Saturday. She will be going to the bathroom all day. Please advise.

## 2017-11-02 NOTE — TELEPHONE ENCOUNTER
Spoke with patient and advised about DR Shah's note and verbalized understanding.  Patient states that she will take half of the Mg Citrate in the morning before the stress test and will drink the other half bottle after the test, afraid that she  might hav

## 2017-11-02 NOTE — TELEPHONE ENCOUNTER
Signed Prescriptions Disp Refills    Fluticasone Propionate 50 MCG/ACT Nasal Suspension 1 Inhaler 4      Sig: USE TWO SPRAYS IN EACH NOSTRIL DAILY        Authorizing Provider: Joyce Shown        Ordering User: Bita lamb

## 2017-11-03 ENCOUNTER — TELEPHONE (OUTPATIENT)
Dept: FAMILY MEDICINE CLINIC | Facility: CLINIC | Age: 62
End: 2017-11-03

## 2017-11-03 ENCOUNTER — TELEPHONE (OUTPATIENT)
Dept: OTHER | Age: 62
End: 2017-11-03

## 2017-11-03 ENCOUNTER — HOSPITAL ENCOUNTER (OUTPATIENT)
Dept: NUCLEAR MEDICINE | Facility: HOSPITAL | Age: 62
Discharge: HOME | End: 2017-11-03
Attending: FAMILY MEDICINE
Payer: COMMERCIAL

## 2017-11-03 ENCOUNTER — HOSPITAL ENCOUNTER (OUTPATIENT)
Dept: CV DIAGNOSTICS | Facility: HOSPITAL | Age: 62
Discharge: HOME | End: 2017-11-03
Attending: FAMILY MEDICINE
Payer: COMMERCIAL

## 2017-11-03 DIAGNOSIS — Z01.818 PREOPERATIVE CLEARANCE: ICD-10-CM

## 2017-11-03 DIAGNOSIS — R94.31 ABNORMAL EKG: ICD-10-CM

## 2017-11-03 PROCEDURE — 93016 CV STRESS TEST SUPVJ ONLY: CPT | Performed by: FAMILY MEDICINE

## 2017-11-03 PROCEDURE — 93018 CV STRESS TEST I&R ONLY: CPT | Performed by: FAMILY MEDICINE

## 2017-11-03 PROCEDURE — 78452 HT MUSCLE IMAGE SPECT MULT: CPT | Performed by: FAMILY MEDICINE

## 2017-11-03 PROCEDURE — 93017 CV STRESS TEST TRACING ONLY: CPT | Performed by: FAMILY MEDICINE

## 2017-11-03 RX ORDER — 0.9 % SODIUM CHLORIDE 0.9 %
VIAL (ML) INJECTION
Status: COMPLETED
Start: 2017-11-03 | End: 2017-11-03

## 2017-11-03 RX ADMIN — 0.9 % SODIUM CHLORIDE 10 ML: 0.9 % VIAL (ML) INJECTION at 14:08:00

## 2017-11-03 NOTE — TELEPHONE ENCOUNTER
----- Message from Janneth Chan sent at 11/2/2017  7:43 AM CDT -----  Regarding: Other  Contact: [phone]  Dr Olivier Ranks given the Card Nuc stress test [place] some thought and I don’t know that this will work Friday at 11:15 am since I’m suppos

## 2017-11-03 NOTE — TELEPHONE ENCOUNTER
Per surgery scheduling time has been moved up to 0730 tomorrow morning. JOSE and Matt Montoya at Dr Keren Ocampo office notified.

## 2017-11-03 NOTE — TELEPHONE ENCOUNTER
Pt called and states she had cardiac stress test done today and needs results asap since she has surgery scheduled tomorrow. Results will determine if surgery will be canceled or not. Dr Angel Lee not in office today, routed to on call MD to review.

## 2017-11-04 ENCOUNTER — ANESTHESIA (OUTPATIENT)
Dept: SURGERY | Facility: HOSPITAL | Age: 62
End: 2017-11-04
Payer: COMMERCIAL

## 2017-11-04 ENCOUNTER — SURGERY (OUTPATIENT)
Age: 62
End: 2017-11-04

## 2017-11-04 ENCOUNTER — ANESTHESIA EVENT (OUTPATIENT)
Dept: SURGERY | Facility: HOSPITAL | Age: 62
End: 2017-11-04
Payer: COMMERCIAL

## 2017-11-04 ENCOUNTER — HOSPITAL ENCOUNTER (OUTPATIENT)
Facility: HOSPITAL | Age: 62
Setting detail: HOSPITAL OUTPATIENT SURGERY
Discharge: HOME | End: 2017-11-04
Attending: OBSTETRICS & GYNECOLOGY | Admitting: OBSTETRICS & GYNECOLOGY
Payer: COMMERCIAL

## 2017-11-04 VITALS
RESPIRATION RATE: 14 BRPM | SYSTOLIC BLOOD PRESSURE: 108 MMHG | DIASTOLIC BLOOD PRESSURE: 53 MMHG | TEMPERATURE: 98 F | OXYGEN SATURATION: 95 % | HEIGHT: 60 IN | HEART RATE: 80 BPM | WEIGHT: 206 LBS | BODY MASS INDEX: 40.44 KG/M2

## 2017-11-04 PROCEDURE — 0UT24ZZ RESECTION OF BILATERAL OVARIES, PERCUTANEOUS ENDOSCOPIC APPROACH: ICD-10-PCS | Performed by: OBSTETRICS & GYNECOLOGY

## 2017-11-04 PROCEDURE — 58661 LAPAROSCOPY REMOVE ADNEXA: CPT | Performed by: OBSTETRICS & GYNECOLOGY

## 2017-11-04 PROCEDURE — 0UT74ZZ RESECTION OF BILATERAL FALLOPIAN TUBES, PERCUTANEOUS ENDOSCOPIC APPROACH: ICD-10-PCS | Performed by: OBSTETRICS & GYNECOLOGY

## 2017-11-04 RX ORDER — HYDROCODONE BITARTRATE AND ACETAMINOPHEN 7.5; 325 MG/1; MG/1
1 TABLET ORAL EVERY 6 HOURS PRN
Qty: 30 TABLET | Refills: 0 | Status: SHIPPED | OUTPATIENT
Start: 2017-11-04 | End: 2018-03-10 | Stop reason: ALTCHOICE

## 2017-11-04 RX ORDER — HYDRALAZINE HYDROCHLORIDE 20 MG/ML
INJECTION INTRAMUSCULAR; INTRAVENOUS AS NEEDED
Status: DISCONTINUED | OUTPATIENT
Start: 2017-11-04 | End: 2017-11-04 | Stop reason: SURG

## 2017-11-04 RX ORDER — HYDROMORPHONE HYDROCHLORIDE 1 MG/ML
0.6 INJECTION, SOLUTION INTRAMUSCULAR; INTRAVENOUS; SUBCUTANEOUS EVERY 5 MIN PRN
Status: DISCONTINUED | OUTPATIENT
Start: 2017-11-04 | End: 2017-11-04

## 2017-11-04 RX ORDER — SCOLOPAMINE TRANSDERMAL SYSTEM 1 MG/1
1 PATCH, EXTENDED RELEASE TRANSDERMAL
Status: DISCONTINUED | OUTPATIENT
Start: 2017-11-04 | End: 2017-11-07

## 2017-11-04 RX ORDER — HALOPERIDOL 5 MG/ML
0.25 INJECTION INTRAMUSCULAR ONCE AS NEEDED
Status: DISCONTINUED | OUTPATIENT
Start: 2017-11-04 | End: 2017-11-04

## 2017-11-04 RX ORDER — NEOSTIGMINE METHYLSULFATE 0.5 MG/ML
INJECTION INTRAVENOUS AS NEEDED
Status: DISCONTINUED | OUTPATIENT
Start: 2017-11-04 | End: 2017-11-04 | Stop reason: SURG

## 2017-11-04 RX ORDER — ONDANSETRON 4 MG/1
4 TABLET, FILM COATED ORAL EVERY 8 HOURS PRN
Qty: 30 TABLET | Refills: 0 | Status: SHIPPED | OUTPATIENT
Start: 2017-11-04 | End: 2018-03-10 | Stop reason: ALTCHOICE

## 2017-11-04 RX ORDER — GLYCOPYRROLATE 0.2 MG/ML
INJECTION INTRAMUSCULAR; INTRAVENOUS AS NEEDED
Status: DISCONTINUED | OUTPATIENT
Start: 2017-11-04 | End: 2017-11-04 | Stop reason: SURG

## 2017-11-04 RX ORDER — LABETALOL HYDROCHLORIDE 5 MG/ML
INJECTION, SOLUTION INTRAVENOUS AS NEEDED
Status: DISCONTINUED | OUTPATIENT
Start: 2017-11-04 | End: 2017-11-04 | Stop reason: SURG

## 2017-11-04 RX ORDER — NALOXONE HYDROCHLORIDE 0.4 MG/ML
80 INJECTION, SOLUTION INTRAMUSCULAR; INTRAVENOUS; SUBCUTANEOUS AS NEEDED
Status: DISCONTINUED | OUTPATIENT
Start: 2017-11-04 | End: 2017-11-04

## 2017-11-04 RX ORDER — SODIUM CHLORIDE, SODIUM LACTATE, POTASSIUM CHLORIDE, CALCIUM CHLORIDE 600; 310; 30; 20 MG/100ML; MG/100ML; MG/100ML; MG/100ML
INJECTION, SOLUTION INTRAVENOUS CONTINUOUS
Status: DISCONTINUED | OUTPATIENT
Start: 2017-11-04 | End: 2017-11-04

## 2017-11-04 RX ORDER — FAMOTIDINE 20 MG/1
20 TABLET ORAL ONCE
Status: COMPLETED | OUTPATIENT
Start: 2017-11-04 | End: 2017-11-04

## 2017-11-04 RX ORDER — ONDANSETRON 2 MG/ML
INJECTION INTRAMUSCULAR; INTRAVENOUS AS NEEDED
Status: DISCONTINUED | OUTPATIENT
Start: 2017-11-04 | End: 2017-11-04 | Stop reason: SURG

## 2017-11-04 RX ORDER — LIDOCAINE HYDROCHLORIDE 10 MG/ML
INJECTION, SOLUTION EPIDURAL; INFILTRATION; INTRACAUDAL; PERINEURAL AS NEEDED
Status: DISCONTINUED | OUTPATIENT
Start: 2017-11-04 | End: 2017-11-04 | Stop reason: SURG

## 2017-11-04 RX ORDER — ACETAMINOPHEN 500 MG
1000 TABLET ORAL ONCE
Status: COMPLETED | OUTPATIENT
Start: 2017-11-04 | End: 2017-11-04

## 2017-11-04 RX ORDER — HEPARIN SODIUM 5000 [USP'U]/ML
5000 INJECTION, SOLUTION INTRAVENOUS; SUBCUTANEOUS ONCE
Status: COMPLETED | OUTPATIENT
Start: 2017-11-04 | End: 2017-11-04

## 2017-11-04 RX ORDER — HYDROMORPHONE HYDROCHLORIDE 1 MG/ML
0.4 INJECTION, SOLUTION INTRAMUSCULAR; INTRAVENOUS; SUBCUTANEOUS EVERY 5 MIN PRN
Status: DISCONTINUED | OUTPATIENT
Start: 2017-11-04 | End: 2017-11-04

## 2017-11-04 RX ORDER — ROCURONIUM BROMIDE 10 MG/ML
INJECTION, SOLUTION INTRAVENOUS AS NEEDED
Status: DISCONTINUED | OUTPATIENT
Start: 2017-11-04 | End: 2017-11-04 | Stop reason: SURG

## 2017-11-04 RX ORDER — ONDANSETRON 4 MG/1
4 TABLET, FILM COATED ORAL EVERY 8 HOURS PRN
Status: DISCONTINUED | OUTPATIENT
Start: 2017-11-04 | End: 2017-11-04

## 2017-11-04 RX ORDER — HYDROMORPHONE HYDROCHLORIDE 1 MG/ML
0.2 INJECTION, SOLUTION INTRAMUSCULAR; INTRAVENOUS; SUBCUTANEOUS EVERY 5 MIN PRN
Status: DISCONTINUED | OUTPATIENT
Start: 2017-11-04 | End: 2017-11-04

## 2017-11-04 RX ORDER — MIDAZOLAM HYDROCHLORIDE 1 MG/ML
INJECTION INTRAMUSCULAR; INTRAVENOUS AS NEEDED
Status: DISCONTINUED | OUTPATIENT
Start: 2017-11-04 | End: 2017-11-04 | Stop reason: SURG

## 2017-11-04 RX ORDER — ONDANSETRON 2 MG/ML
4 INJECTION INTRAMUSCULAR; INTRAVENOUS EVERY 8 HOURS PRN
Status: DISCONTINUED | OUTPATIENT
Start: 2017-11-04 | End: 2017-11-04

## 2017-11-04 RX ORDER — ONDANSETRON 2 MG/ML
4 INJECTION INTRAMUSCULAR; INTRAVENOUS ONCE AS NEEDED
Status: DISCONTINUED | OUTPATIENT
Start: 2017-11-04 | End: 2017-11-04

## 2017-11-04 RX ORDER — DEXAMETHASONE SODIUM PHOSPHATE 4 MG/ML
VIAL (ML) INJECTION AS NEEDED
Status: DISCONTINUED | OUTPATIENT
Start: 2017-11-04 | End: 2017-11-04 | Stop reason: SURG

## 2017-11-04 RX ORDER — METOCLOPRAMIDE 10 MG/1
10 TABLET ORAL ONCE
Status: COMPLETED | OUTPATIENT
Start: 2017-11-04 | End: 2017-11-04

## 2017-11-04 RX ADMIN — NEOSTIGMINE METHYLSULFATE 3 MG: 0.5 INJECTION INTRAVENOUS at 09:00:00

## 2017-11-04 RX ADMIN — LABETALOL HYDROCHLORIDE 20 MG: 5 INJECTION, SOLUTION INTRAVENOUS at 08:32:00

## 2017-11-04 RX ADMIN — ROCURONIUM BROMIDE 50 MG: 10 INJECTION, SOLUTION INTRAVENOUS at 08:00:00

## 2017-11-04 RX ADMIN — GLYCOPYRROLATE 0.6 MG: 0.2 INJECTION INTRAMUSCULAR; INTRAVENOUS at 09:00:00

## 2017-11-04 RX ADMIN — DEXAMETHASONE SODIUM PHOSPHATE 4 MG: 4 MG/ML VIAL (ML) INJECTION at 08:00:00

## 2017-11-04 RX ADMIN — MIDAZOLAM HYDROCHLORIDE 2 MG: 1 INJECTION INTRAMUSCULAR; INTRAVENOUS at 08:00:00

## 2017-11-04 RX ADMIN — SODIUM CHLORIDE, SODIUM LACTATE, POTASSIUM CHLORIDE, CALCIUM CHLORIDE: 600; 310; 30; 20 INJECTION, SOLUTION INTRAVENOUS at 08:35:00

## 2017-11-04 RX ADMIN — HYDRALAZINE HYDROCHLORIDE 10 MG: 20 INJECTION INTRAMUSCULAR; INTRAVENOUS at 08:23:00

## 2017-11-04 RX ADMIN — SODIUM CHLORIDE, SODIUM LACTATE, POTASSIUM CHLORIDE, CALCIUM CHLORIDE: 600; 310; 30; 20 INJECTION, SOLUTION INTRAVENOUS at 09:10:00

## 2017-11-04 RX ADMIN — SODIUM CHLORIDE, SODIUM LACTATE, POTASSIUM CHLORIDE, CALCIUM CHLORIDE: 600; 310; 30; 20 INJECTION, SOLUTION INTRAVENOUS at 08:00:00

## 2017-11-04 RX ADMIN — ONDANSETRON 4 MG: 2 INJECTION INTRAMUSCULAR; INTRAVENOUS at 08:00:00

## 2017-11-04 RX ADMIN — LIDOCAINE HYDROCHLORIDE 50 MG: 10 INJECTION, SOLUTION EPIDURAL; INFILTRATION; INTRACAUDAL; PERINEURAL at 08:00:00

## 2017-11-04 NOTE — OPERATIVE REPORT
[facility]    Gyne Operative Note    Ahsan Price Patient Status:  Surgery Admit    1955 MRN [id number]   Location [place] [address] Attending Scott Fernandez MD   Hosp Day # 0 PCP Kurt Velarde MD        Preop across the round ligament curving under the fallopian tube ovary complex. Once this was freed attention was turned to the left side where now the body was tilted slightly to the right and again the same procedure was performed.   while grasping the left fa

## 2017-11-04 NOTE — ANESTHESIA PREPROCEDURE EVALUATION
Anesthesia PreOp Note    HPI:     Santhosh Powell is a 57year old female who presents for preoperative consultation requested by: Jami Cornejo MD    Date of Surgery: 11/4/2017    Procedure(s):  LAPAROSCOPIC SALPINGO-OOPHORECTOMY  EXPLORATORY LAPAROTOM PER                NEXTGEN:  \"HYSTERECTOMY 1985\"  No date: JULIO NEEDLE LOCALIZATION W/ SPECIMEN 1 SITE LEFT  No date: OTHER SURGICAL HISTORY      Comment: adenoma removal of left breast  No date: YAG IRIDECTOMY - OU - BOTH EYES      Comment: OS 5/10/12, O Test fingerstick blood sugar four times daily. Disp: 100 each Rfl: 2 Taking   Insulin Pen Needle (NOVOFINE) 32G X 6 MM Does not apply Misc Test fingerstick blood sugar four times daily.  Disp: 100 each Rfl: 2 Taking   NOVOFINE 32G X 6 MM Does not apply Misc 10/26/2017       Lab Results  Component Value Date    10/26/2017   K 3.7 10/26/2017    10/26/2017   CO2 27 10/26/2017   BUN 16 10/26/2017   CREATSERUM 0.99 10/26/2017    (H) 10/26/2017   PGLU 184 (H) 11/04/2017   CA 9.3 10/26/2017

## 2017-11-04 NOTE — ANESTHESIA POSTPROCEDURE EVALUATION
Patient:  Alejandro Pete    Procedure Summary     Date:  11/04/17 Room / Location:  [facility] OR  / [facility] OR    Anesthesia Start:  9036 Anesthesia Stop:      Procedures:       LAPAROSCOPIC SALPINGO-OOPHORECTOMY (Bilateral )      EXPLORATORY LAPAROTOMY

## 2017-11-04 NOTE — H&P
[facility]    Gyne History & Physical    [facility] Patient Status:  Surgery Admit    1955 MRN [id number]   [place] [address] Attending Grecia Toscano MD   Hosp Day # 0 PCP Kalie Oakes MD     Date Rfl: 4 11/2/2017   LISINOPRIL 30 MG Oral Tab TAKE ONE TABLET BY MOUTH DAILY Disp: 90 tablet Rfl: 4 11/4/2017 at 0400   VITAMIN D3 SUPER STRENGTH 2000 UNITS Oral Tab TAKE ONE TABLET BY MOUTH ONE TIME DAILY  Disp: 30 tablet Rfl: 11 11/1/2017   Vitamin B-12 ( vag hyst  (PLEASE VERIFY WITH PATIENT.   PER                NEXTGEN:  \"HYSTERECTOMY 1985\"  No date: JULIO NEEDLE LOCALIZATION W/ SPECIMEN 1 SITE LEFT  No date: OTHER SURGICAL HISTORY      Comment: adenoma removal of left breast  No date: YAG IRIDECTOMY - OU HGB 13.8 10/26/2017   HCT 41.9 10/26/2017    10/26/2017   CREATSERUM 0.99 10/26/2017   BUN 16 10/26/2017    10/26/2017   K 3.7 10/26/2017    10/26/2017   CO2 27 10/26/2017    (H) 10/26/2017   CA 9.3 10/26/2017   ALB 4.0 10/26/20

## 2017-11-09 ENCOUNTER — TELEPHONE (OUTPATIENT)
Dept: OBGYN CLINIC | Facility: CLINIC | Age: 62
End: 2017-11-09

## 2017-11-13 ENCOUNTER — TELEPHONE (OUTPATIENT)
Dept: OBGYN CLINIC | Facility: CLINIC | Age: 62
End: 2017-11-13

## 2017-11-13 NOTE — TELEPHONE ENCOUNTER
Pt informed of NJGs recs below and verbalized understanding. Pt offered appts for this Friday or Monday 11/20 for post op visit but pt stated she needs to call to speak with her daughter first to see when she can schedule post op appt.  When pt calls back--

## 2017-11-13 NOTE — TELEPHONE ENCOUNTER
----- Message from Jayla Mitchell MD sent at 11/13/2017  2:50 PM CST -----  No postop visit pending -- call pt

## 2017-11-17 ENCOUNTER — OFFICE VISIT (OUTPATIENT)
Dept: OBGYN CLINIC | Facility: CLINIC | Age: 62
End: 2017-11-17

## 2017-11-17 VITALS
DIASTOLIC BLOOD PRESSURE: 81 MMHG | SYSTOLIC BLOOD PRESSURE: 148 MMHG | BODY MASS INDEX: 40 KG/M2 | WEIGHT: 206 LBS | HEART RATE: 85 BPM

## 2017-11-17 DIAGNOSIS — D27.9: Primary | ICD-10-CM

## 2017-11-17 DIAGNOSIS — L72.3 SEBACEOUS CYST: ICD-10-CM

## 2017-11-17 PROCEDURE — 99213 OFFICE O/P EST LOW 20 MIN: CPT | Performed by: OBSTETRICS & GYNECOLOGY

## 2017-11-17 NOTE — PROGRESS NOTES
Carmencita Concepcion is a 57year old female  No LMP recorded. Patient is not currently having periods (Reason: Partial Hysterectomy). Patient presents with:  Post-Op: Bilateral salpingo-oophorectomy  Other: lump near groin  .      OBSTETRICS HISTORY:  O Live Births1     Comment: menopausal yes   age: approx 47 y/o  Calcium:  no  Last mammo: 2011  Last dexa scan: 2011, repeat 2016  SBE:  no  HRT use:  None  Last pap 2010       SOCIAL HISTORY:    Social History  Social History   Marital status:   Sp MEALS, Disp: 180 tablet, Rfl: 0  •  PANTOPRAZOLE SODIUM 40 MG Oral Tab EC, TAKE ONE TABLET BY MOUTH ONE TIME DAILY, Disp: 90 tablet, Rfl: 4  •  LISINOPRIL 30 MG Oral Tab, TAKE ONE TABLET BY MOUTH DAILY, Disp: 90 tablet, Rfl: 4  •  Insulin Syringe 30G X 5/1 Constitutional:   well developed, well nourished  Head/Face:  normocephalic  Abdomen:    soft, nontender, nondistended, no masses  Skin/Hair:   no unusual rashes or bruises  Extremities:   no edema, no cyanosis  Psychiatric:    oriented to time, place, p

## 2017-11-21 RX ORDER — GLIMEPIRIDE 1 MG/1
TABLET ORAL
Qty: 90 TABLET | Refills: 0 | Status: SHIPPED | OUTPATIENT
Start: 2017-11-21 | End: 2018-03-25

## 2017-11-29 NOTE — TELEPHONE ENCOUNTER
From: Manas Neely  Sent: 11/27/2017 9:54 PM CST  Subject: Medication Renewal Request    Manas Neely would like a refill of the following medications:     METFORMIN HCL 1000 MG Oral Tab Lambert David Cardoso MD]    Preferred pharmacy: [facility] [id number] [place]

## 2017-12-15 ENCOUNTER — TELEPHONE (OUTPATIENT)
Dept: OBGYN CLINIC | Facility: CLINIC | Age: 62
End: 2017-12-15

## 2017-12-15 NOTE — TELEPHONE ENCOUNTER
Consult note dated 11/21/17 from Dr Stella Warner placed on [facility]'s desk for review.

## 2017-12-22 RX ORDER — GLIMEPIRIDE 1 MG/1
TABLET ORAL
Qty: 90 TABLET | Refills: 0
Start: 2017-12-22

## 2018-01-06 NOTE — TELEPHONE ENCOUNTER
Refilled per protocol.      Diabetes Medications  Protocol Criteria:  · Appointment scheduled in the past 6 months or the next 3 months  · A1C < 7.5 in the past 6 months  · Creatinine in the past 12 months  · Creatinine result < 1.5   Recent Outpatient Visi

## 2018-01-06 NOTE — TELEPHONE ENCOUNTER
From: Yamileth Dexter  Sent: 1/4/2018 8:56 PM CST  Subject: Medication Renewal Request    Yamileth Dexter would like a refill of the following medications:     Insulin Aspart Pen (NOVOLOG FLEXPEN) 100 UNIT/ML Subcutaneous Solution Pen-injector [Adal SMITH

## 2018-01-07 ENCOUNTER — PATIENT MESSAGE (OUTPATIENT)
Dept: FAMILY MEDICINE CLINIC | Facility: CLINIC | Age: 63
End: 2018-01-07

## 2018-01-08 ENCOUNTER — PATIENT MESSAGE (OUTPATIENT)
Dept: FAMILY MEDICINE CLINIC | Facility: CLINIC | Age: 63
End: 2018-01-08

## 2018-01-08 RX ORDER — FENOFIBRATE 145 MG/1
145 TABLET, COATED ORAL
Qty: 90 TABLET | Refills: 1 | Status: SHIPPED
Start: 2018-01-08 | End: 2018-07-06

## 2018-01-08 NOTE — TELEPHONE ENCOUNTER
Cholesterol Medications  Protocol Criteria:  · Appointment scheduled in the past 12 months or in the next 3 months  · ALT & LDL on file in the past 12 months  · ALT result < 80  · LDL result <130   Recent Outpatient Visits            1 month ago Ovarian tu

## 2018-01-08 NOTE — TELEPHONE ENCOUNTER
Signed Prescriptions Disp Refills    Fenofibrate 145 MG Oral Tab 90 tablet 1      Sig: Take 1 tablet (145 mg total) by mouth once daily.         Authorizing Provider: Haley Fernando        Ordering User: Joe Delaney           Refill approved per dinah

## 2018-01-09 NOTE — TELEPHONE ENCOUNTER
From: Manas Neely  To: Sivan Kam MD  Sent: 1/8/2018 9:27 PM CST  Subject: Prescription Question    Could you please change the dosage on Novolog to 18 units per meal not 14.  It use to regularly be 18 before but went down when I wasn’t eating b

## 2018-01-09 NOTE — TELEPHONE ENCOUNTER
Dr Rojas Sites, please see pending order, patient has increased novolog to 18 units three times a day

## 2018-01-19 ENCOUNTER — PATIENT MESSAGE (OUTPATIENT)
Dept: FAMILY MEDICINE CLINIC | Facility: CLINIC | Age: 63
End: 2018-01-19

## 2018-01-19 DIAGNOSIS — E11.9 TYPE 2 DIABETES MELLITUS WITHOUT COMPLICATION, UNSPECIFIED LONG TERM INSULIN USE STATUS: ICD-10-CM

## 2018-01-19 NOTE — TELEPHONE ENCOUNTER
From: Jose Kaplan  To:  Tim Castle MD  Sent: 1/19/2018 10:41 AM CST  Subject: Prescription Question     Dr. Gloria Oliveira, would it be possible for you to write me a prescription for a Glucose meter and testing strips alcohol swabs lancets all that is ne

## 2018-01-19 NOTE — TELEPHONE ENCOUNTER
Dr. Veloz End, please see patient request below. Patient has not been prescribed meter or supplies in the past however is on insulin. Awaiting verification as to how often patient tests sugars at home for sig.  Please advise if ok to fill scripts once that has b

## 2018-01-20 RX ORDER — BLOOD PRESSURE TEST KIT
KIT MISCELLANEOUS
Qty: 100 EACH | Refills: 1 | Status: SHIPPED | OUTPATIENT
Start: 2018-01-20 | End: 2018-03-10 | Stop reason: ALTCHOICE

## 2018-01-20 RX ORDER — GLUCOSAMINE HCL/CHONDROITIN SU 500-400 MG
CAPSULE ORAL
Qty: 100 STRIP | Refills: 1 | Status: SHIPPED | OUTPATIENT
Start: 2018-01-20 | End: 2018-04-12

## 2018-01-20 RX ORDER — DIPHENHYDRAMINE HYDROCHLORIDE 25 MG/1
CAPSULE, LIQUID FILLED ORAL
Qty: 1 KIT | Refills: 0 | Status: SHIPPED | OUTPATIENT
Start: 2018-01-20 | End: 2018-03-10 | Stop reason: ALTCHOICE

## 2018-01-20 RX ORDER — NAPROXEN SODIUM 220 MG
TABLET ORAL
Qty: 100 EACH | Refills: 2 | Status: SHIPPED | OUTPATIENT
Start: 2018-01-20 | End: 2018-07-06

## 2018-01-20 RX ORDER — LANCETS 30 GAUGE
EACH MISCELLANEOUS
Qty: 100 EACH | Refills: 1 | Status: SHIPPED | OUTPATIENT
Start: 2018-01-20 | End: 2020-01-16

## 2018-01-20 NOTE — TELEPHONE ENCOUNTER
DR. Chikis Hernandez: per patient, \"Yes, I test anywhere between four and six times a day. If there is anything else you need to know let me know. Thank you, Angy\"    Do you approve orders?

## 2018-01-26 ENCOUNTER — PATIENT MESSAGE (OUTPATIENT)
Dept: FAMILY MEDICINE CLINIC | Facility: CLINIC | Age: 63
End: 2018-01-26

## 2018-01-27 NOTE — TELEPHONE ENCOUNTER
Med module has lantus- inject 40u. Pt is requesting lantus- inject 60u. pls adjust sig if in agreeement.        Diabetes Medications  Protocol Criteria:  · Appointment scheduled in the past 6 months or the next 3 months  · A1C < 7.5 in the past 6 months  ·

## 2018-01-27 NOTE — TELEPHONE ENCOUNTER
From: Nellie Pedraza  To:  Letty Sanchez MD  Sent: 1/26/2018 7:20 PM CST  Subject: Prescription Question    ,Could you please refill my prescription for Lantis 100 units/ML vial inject 60 units subcutaneously per insulin protocol but instead of

## 2018-01-31 ENCOUNTER — PATIENT MESSAGE (OUTPATIENT)
Dept: FAMILY MEDICINE CLINIC | Facility: CLINIC | Age: 63
End: 2018-01-31

## 2018-02-01 NOTE — TELEPHONE ENCOUNTER
From: Enriqueta Ruiz  To:  Aleta Morgan MD  Sent: 1/31/2018 3:42 PM CST  Subject: Prescription Question    Dr Haile Vazquez I recently requested a refill for my Lantus insulin the dosage should have been 60 units at night instead of 40 units that was change b

## 2018-02-02 ENCOUNTER — TELEPHONE (OUTPATIENT)
Dept: OTHER | Age: 63
End: 2018-02-02

## 2018-02-02 NOTE — TELEPHONE ENCOUNTER
Dr. Choco Cherry, please see pt question below. Her dose was changed when she was not eating well due to ovarian mass. She is requesting Lantus 60u. Please advise, med is pended for 60u daily.

## 2018-02-02 NOTE — TELEPHONE ENCOUNTER
From: Olivia Lowry  To:  Bill Hendrix MD  Sent: 1/31/2018  3:42 PM CST  Subject: Prescription Question    Dr Seun Delvalle I recently requested a refill for my Lantus insulin the dosage should have been 60 units at night instead of 40 units that was change

## 2018-02-26 NOTE — TELEPHONE ENCOUNTER
From: Nancy Jansen  Sent: 2/26/2018 12:04 PM CST  Subject: Medication Renewal Request    Nancy Jansen would like a refill of the following medications:     Fluticasone Propionate 50 MCG/ACT Nasal Suspension Jerry Hubbard MD]    Preferred pharm

## 2018-02-28 RX ORDER — FLUTICASONE PROPIONATE 50 MCG
SPRAY, SUSPENSION (ML) NASAL
Qty: 1 INHALER | Refills: 3 | Status: SHIPPED | OUTPATIENT
Start: 2018-02-28 | End: 2019-07-27

## 2018-02-28 NOTE — TELEPHONE ENCOUNTER
Refill Protocol Appointment Criteria  · Appointment scheduled in the past 12 months or in the next 3 months  Recent Outpatient Visits            3 months ago Ovarian tumor (benign), unspecified laterality    [facility], [address], [place]

## 2018-03-10 ENCOUNTER — OFFICE VISIT (OUTPATIENT)
Dept: FAMILY MEDICINE CLINIC | Facility: CLINIC | Age: 63
End: 2018-03-10

## 2018-03-10 ENCOUNTER — LAB ENCOUNTER (OUTPATIENT)
Dept: LAB | Age: 63
End: 2018-03-10
Attending: FAMILY MEDICINE
Payer: MEDICARE

## 2018-03-10 VITALS
WEIGHT: 209.38 LBS | HEART RATE: 75 BPM | BODY MASS INDEX: 41 KG/M2 | SYSTOLIC BLOOD PRESSURE: 138 MMHG | DIASTOLIC BLOOD PRESSURE: 88 MMHG

## 2018-03-10 DIAGNOSIS — Z79.4 TYPE 2 DIABETES MELLITUS WITHOUT COMPLICATION, WITH LONG-TERM CURRENT USE OF INSULIN (HCC): ICD-10-CM

## 2018-03-10 DIAGNOSIS — R10.13 EPIGASTRIC PAIN: ICD-10-CM

## 2018-03-10 DIAGNOSIS — Z79.4 TYPE 2 DIABETES MELLITUS WITHOUT COMPLICATION, WITH LONG-TERM CURRENT USE OF INSULIN (HCC): Primary | ICD-10-CM

## 2018-03-10 DIAGNOSIS — E11.9 TYPE 2 DIABETES MELLITUS WITHOUT COMPLICATION, WITH LONG-TERM CURRENT USE OF INSULIN (HCC): Primary | ICD-10-CM

## 2018-03-10 DIAGNOSIS — E11.9 TYPE 2 DIABETES MELLITUS WITHOUT COMPLICATION, WITH LONG-TERM CURRENT USE OF INSULIN (HCC): ICD-10-CM

## 2018-03-10 DIAGNOSIS — R23.2 HOT FLASHES: ICD-10-CM

## 2018-03-10 DIAGNOSIS — K42.9 UMBILICAL HERNIA WITHOUT OBSTRUCTION AND WITHOUT GANGRENE: ICD-10-CM

## 2018-03-10 DIAGNOSIS — Z12.11 ENCOUNTER FOR SCREENING COLONOSCOPY: ICD-10-CM

## 2018-03-10 LAB
ALBUMIN SERPL BCP-MCNC: 4 G/DL (ref 3.5–4.8)
ALBUMIN/GLOB SERPL: 1.5 {RATIO} (ref 1–2)
ALP SERPL-CCNC: 28 U/L (ref 32–100)
ALT SERPL-CCNC: 25 U/L (ref 14–54)
ANION GAP SERPL CALC-SCNC: 6 MMOL/L (ref 0–18)
AST SERPL-CCNC: 25 U/L (ref 15–41)
BASOPHILS # BLD: 0.1 K/UL (ref 0–0.2)
BASOPHILS NFR BLD: 1 %
BILIRUB SERPL-MCNC: 0.6 MG/DL (ref 0.3–1.2)
BUN SERPL-MCNC: 17 MG/DL (ref 8–20)
BUN/CREAT SERPL: 16.3 (ref 10–20)
CALCIUM SERPL-MCNC: 9.5 MG/DL (ref 8.5–10.5)
CHLORIDE SERPL-SCNC: 103 MMOL/L (ref 95–110)
CHOLEST SERPL-MCNC: 132 MG/DL (ref 110–200)
CO2 SERPL-SCNC: 27 MMOL/L (ref 22–32)
CREAT SERPL-MCNC: 1.04 MG/DL (ref 0.5–1.5)
CREAT UR-MCNC: 294.1 MG/DL
EOSINOPHIL # BLD: 0.2 K/UL (ref 0–0.7)
EOSINOPHIL NFR BLD: 3 %
ERYTHROCYTE [DISTWIDTH] IN BLOOD BY AUTOMATED COUNT: 12.9 % (ref 11–15)
GLOBULIN PLAS-MCNC: 2.7 G/DL (ref 2.5–3.7)
GLUCOSE SERPL-MCNC: 146 MG/DL (ref 70–99)
HCT VFR BLD AUTO: 39.3 % (ref 35–48)
HDLC SERPL-MCNC: 43 MG/DL
HGB BLD-MCNC: 13.2 G/DL (ref 12–16)
LDLC SERPL CALC-MCNC: 58 MG/DL (ref 0–99)
LYMPHOCYTES # BLD: 2.7 K/UL (ref 1–4)
LYMPHOCYTES NFR BLD: 36 %
MCH RBC QN AUTO: 29.1 PG (ref 27–32)
MCHC RBC AUTO-ENTMCNC: 33.6 G/DL (ref 32–37)
MCV RBC AUTO: 86.6 FL (ref 80–100)
MICROALBUMIN UR-MCNC: 1.3 MG/DL (ref 0–1.8)
MICROALBUMIN/CREAT UR: 4.4 MG/G{CREAT} (ref 0–20)
MONOCYTES # BLD: 0.4 K/UL (ref 0–1)
MONOCYTES NFR BLD: 5 %
NEUTROPHILS # BLD AUTO: 4.3 K/UL (ref 1.8–7.7)
NEUTROPHILS NFR BLD: 56 %
NONHDLC SERPL-MCNC: 89 MG/DL
OSMOLALITY UR CALC.SUM OF ELEC: 286 MOSM/KG (ref 275–295)
PATIENT FASTING: YES
PLATELET # BLD AUTO: 332 K/UL (ref 140–400)
PMV BLD AUTO: 8.8 FL (ref 7.4–10.3)
POTASSIUM SERPL-SCNC: 3.6 MMOL/L (ref 3.3–5.1)
PROT SERPL-MCNC: 6.7 G/DL (ref 5.9–8.4)
RBC # BLD AUTO: 4.54 M/UL (ref 3.7–5.4)
SODIUM SERPL-SCNC: 136 MMOL/L (ref 136–144)
TRIGL SERPL-MCNC: 156 MG/DL (ref 1–149)
TSH SERPL-ACNC: 2.89 UIU/ML (ref 0.45–5.33)
WBC # BLD AUTO: 7.7 K/UL (ref 4–11)

## 2018-03-10 PROCEDURE — 85025 COMPLETE CBC W/AUTO DIFF WBC: CPT

## 2018-03-10 PROCEDURE — 84443 ASSAY THYROID STIM HORMONE: CPT

## 2018-03-10 PROCEDURE — 82043 UR ALBUMIN QUANTITATIVE: CPT

## 2018-03-10 PROCEDURE — G0463 HOSPITAL OUTPT CLINIC VISIT: HCPCS | Performed by: FAMILY MEDICINE

## 2018-03-10 PROCEDURE — 82570 ASSAY OF URINE CREATININE: CPT

## 2018-03-10 PROCEDURE — 80061 LIPID PANEL: CPT

## 2018-03-10 PROCEDURE — 36415 COLL VENOUS BLD VENIPUNCTURE: CPT

## 2018-03-10 PROCEDURE — 83036 HEMOGLOBIN GLYCOSYLATED A1C: CPT

## 2018-03-10 PROCEDURE — 80053 COMPREHEN METABOLIC PANEL: CPT

## 2018-03-10 PROCEDURE — 99214 OFFICE O/P EST MOD 30 MIN: CPT | Performed by: FAMILY MEDICINE

## 2018-03-10 RX ORDER — VENLAFAXINE 37.5 MG/1
37.5 TABLET ORAL 2 TIMES DAILY
Qty: 60 TABLET | Refills: 2 | Status: SHIPPED | OUTPATIENT
Start: 2018-03-10 | End: 2018-07-23

## 2018-03-10 NOTE — PROGRESS NOTES
Jen Mccallum is a 70year old female. Patient presents with:  Musculoskeletal Problem    HPI:   1 month - Has nausea on Occasion. Has lump on left side of umbilicus.  Reports a lot of gas pain  And having pain in \"pit \" of her stomach - pointing to ep BY MOUTH ONE TIME DAILY Disp: 90 tablet Rfl: 4   LISINOPRIL 30 MG Oral Tab TAKE ONE TABLET BY MOUTH DAILY Disp: 90 tablet Rfl: 4   Insulin Pen Needle (NOVOFINE) 32G X 6 MM Does not apply Misc Test fingerstick blood sugar four times daily.  Disp: 100 each Rf Pulse 75   Wt 209 lb 6.4 oz (95 kg)   BMI 40.90 kg/m²   GENERAL: well developed, well nourished,in no apparent distress  SKIN: no rashes,no suspicious lesions  LUNGS: clear to auscultation  CARDIO: RRR without murmur  GI: good BS's,no masses, HSM.  Pos epig

## 2018-03-11 ENCOUNTER — PATIENT MESSAGE (OUTPATIENT)
Dept: FAMILY MEDICINE CLINIC | Facility: CLINIC | Age: 63
End: 2018-03-11

## 2018-03-11 LAB — HBA1C MFR BLD: 6.6 % (ref 4–6)

## 2018-03-12 NOTE — TELEPHONE ENCOUNTER
From: Jose Kaplan  To:  Tim Castle MD  Sent: 3/11/2018 8:06 PM CDT  Subject: Non-Urgent Medical Question    Dr Gloria Oliveira,   Do you think I should schedule the endoscopy and colonoscopy first or should I schedule the hernia surgery first? Please let m

## 2018-03-14 ENCOUNTER — TELEPHONE (OUTPATIENT)
Dept: OTHER | Age: 63
End: 2018-03-14

## 2018-03-14 NOTE — TELEPHONE ENCOUNTER
No answer at [facility][phone] and mailbox full. I left message at [facility] for pt to call nurse back to triage her mychart message which Dr Jose Luis Kirk needs clarification.

## 2018-03-15 ENCOUNTER — OFFICE VISIT (OUTPATIENT)
Dept: SURGERY | Facility: CLINIC | Age: 63
End: 2018-03-15

## 2018-03-15 VITALS — WEIGHT: 209 LBS | BODY MASS INDEX: 41.03 KG/M2 | HEIGHT: 60 IN

## 2018-03-15 DIAGNOSIS — K43.2 INCISIONAL HERNIA, WITHOUT OBSTRUCTION OR GANGRENE: Primary | ICD-10-CM

## 2018-03-15 PROCEDURE — 99204 OFFICE O/P NEW MOD 45 MIN: CPT | Performed by: SURGERY

## 2018-03-15 PROCEDURE — G0463 HOSPITAL OUTPT CLINIC VISIT: HCPCS | Performed by: SURGERY

## 2018-03-15 NOTE — TELEPHONE ENCOUNTER
Pt advised to keep Appt  Call back or go straight to ER if s/sx worsen, questions or concerns. Patient verbalized understanding and agrees with plan.                   Dr. Sherron Echeverria, I have an appointment with Dr Kaila Foster [facility] Thursday at 1:30 and can’t get in

## 2018-03-16 NOTE — H&P
History and Physical      Fuentes Fortune is a 70year old female. HPI   Patient presents with:  Hernia: Pt referred by Dr. Virginia Reis regarding umbilical hernia x 1 month.   Pt states she had laparoscopic bilateral salpingo-oophorectomy in Nov.  Pt c/o shon glargine (LANTUS) 100 UNIT/ML Subcutaneous Solution 60 u sq daily. Disp: 30 mL Rfl: 1   Insulin Syringe 30G X 5/16\" 0.5 ML Does not apply Misc Test fingerstick blood sugar four times daily.  Disp: 100 each Rfl: 2   Glucose Blood (BLOOD GLUCOSE TEST) In Vit and vomiting    Comment:Abdominal pain  Morphine                Nausea and vomiting    Comment:Abdominal pain  Patanol [Olopatadin*    Nausea and vomiting    Comment:Abdominal pain    SOCIAL HISTORY  Social History    Marital status:              Sp lap incisions, sl tender reducible supraumbilical bulge with effort, maybe 2-3cm defect, no other inguinal or ventral hernias, intertrig dermatitis  Extremities: no edema, cyanosis, or clubbing  Neurological: exam appropriate for age reflexes and motor ski

## 2018-03-23 NOTE — TELEPHONE ENCOUNTER
From: Chandni Travis  Sent: 3/23/2018 10:22 AM CDT  Subject: Medication Renewal Request    Chandni Travis would like a refill of the following medications:     NOVOFINE 32G X 6 MM Does not apply [facility] Sabas Lucas MD]    Preferred pharmacy: [facility]

## 2018-03-27 RX ORDER — GLIMEPIRIDE 1 MG/1
TABLET ORAL
Qty: 90 TABLET | Refills: 0 | Status: SHIPPED | OUTPATIENT
Start: 2018-03-27 | End: 2018-06-24

## 2018-03-27 NOTE — TELEPHONE ENCOUNTER
Diabetes Medications  Protocol Criteria:  · Appointment scheduled in the past 6 months or the next 3 months  · A1C < 7.5 in the past 6 months  · Creatinine in the past 12 months  · Creatinine result < 1.5   Recent Outpatient Visits            1 week ago In

## 2018-03-30 ENCOUNTER — APPOINTMENT (OUTPATIENT)
Dept: LAB | Age: 63
End: 2018-03-30
Attending: PHYSICIAN ASSISTANT
Payer: COMMERCIAL

## 2018-03-30 ENCOUNTER — TELEPHONE (OUTPATIENT)
Dept: GASTROENTEROLOGY | Facility: CLINIC | Age: 63
End: 2018-03-30

## 2018-03-30 ENCOUNTER — OFFICE VISIT (OUTPATIENT)
Dept: GASTROENTEROLOGY | Facility: CLINIC | Age: 63
End: 2018-03-30

## 2018-03-30 VITALS
DIASTOLIC BLOOD PRESSURE: 89 MMHG | SYSTOLIC BLOOD PRESSURE: 138 MMHG | WEIGHT: 207 LBS | BODY MASS INDEX: 40.64 KG/M2 | HEART RATE: 76 BPM | HEIGHT: 60 IN

## 2018-03-30 DIAGNOSIS — R19.7 DIARRHEA, UNSPECIFIED TYPE: ICD-10-CM

## 2018-03-30 DIAGNOSIS — K21.9 GASTROESOPHAGEAL REFLUX DISEASE, ESOPHAGITIS PRESENCE NOT SPECIFIED: ICD-10-CM

## 2018-03-30 DIAGNOSIS — R11.0 NAUSEA: ICD-10-CM

## 2018-03-30 DIAGNOSIS — R19.7 DIARRHEA, UNSPECIFIED TYPE: Primary | ICD-10-CM

## 2018-03-30 DIAGNOSIS — Z80.0 FAMILY HISTORY OF COLON CANCER: ICD-10-CM

## 2018-03-30 DIAGNOSIS — R10.84 DIFFUSE ABDOMINAL PAIN: ICD-10-CM

## 2018-03-30 DIAGNOSIS — Z86.2 HISTORY OF ANEMIA: ICD-10-CM

## 2018-03-30 DIAGNOSIS — R19.5 HEME + STOOL: ICD-10-CM

## 2018-03-30 DIAGNOSIS — Z80.0 FAMILY HISTORY OF COLON CANCER: Primary | ICD-10-CM

## 2018-03-30 DIAGNOSIS — Z91.89 AT RISK FOR PROLONGED QT INTERVAL SYNDROME: Primary | ICD-10-CM

## 2018-03-30 DIAGNOSIS — R10.13 EPIGASTRIC PAIN: ICD-10-CM

## 2018-03-30 DIAGNOSIS — R10.13 EPIGASTRIC ABDOMINAL PAIN: ICD-10-CM

## 2018-03-30 DIAGNOSIS — R19.5 OCCULT BLOOD POSITIVE STOOL: ICD-10-CM

## 2018-03-30 PROCEDURE — 82784 ASSAY IGA/IGD/IGG/IGM EACH: CPT

## 2018-03-30 PROCEDURE — 99204 OFFICE O/P NEW MOD 45 MIN: CPT | Performed by: PHYSICIAN ASSISTANT

## 2018-03-30 PROCEDURE — 36415 COLL VENOUS BLD VENIPUNCTURE: CPT

## 2018-03-30 PROCEDURE — G0463 HOSPITAL OUTPT CLINIC VISIT: HCPCS | Performed by: PHYSICIAN ASSISTANT

## 2018-03-30 PROCEDURE — 83516 IMMUNOASSAY NONANTIBODY: CPT

## 2018-03-30 RX ORDER — POLYETHYLENE GLYCOL 3350, SODIUM CHLORIDE, SODIUM BICARBONATE, POTASSIUM CHLORIDE 420; 11.2; 5.72; 1.48 G/4L; G/4L; G/4L; G/4L
POWDER, FOR SOLUTION ORAL
Qty: 1 BOTTLE | Refills: 0 | Status: SHIPPED | OUTPATIENT
Start: 2018-03-30 | End: 2018-07-14

## 2018-03-30 RX ORDER — DICYCLOMINE HYDROCHLORIDE 10 MG/1
10 CAPSULE ORAL
Qty: 120 CAPSULE | Refills: 0 | Status: SHIPPED | OUTPATIENT
Start: 2018-03-30 | End: 2018-07-14

## 2018-03-30 RX ORDER — ONDANSETRON 4 MG/1
4 TABLET, FILM COATED ORAL EVERY 12 HOURS PRN
Qty: 30 TABLET | Refills: 0 | Status: SHIPPED | OUTPATIENT
Start: 2018-03-30 | End: 2020-10-27

## 2018-03-30 NOTE — PATIENT INSTRUCTIONS
1. Schedule colonoscopy and upper endoscopy with MAC sedation with Dr. Taj Singh @ [facility] 80 up bowel prep from pharmacy - I have prescribed Colyte split dose preparation.     3. I will send a message to Dr. Corrie Snell to advise on your

## 2018-03-30 NOTE — H&P
[address] [facility] [address] Gastroenterology                                                                                                  Clinic History and Physical     Pa as she used to after surgery. Denies any unintentional weight loss, however. GERD used to be well controlled with use of daily PPI, now noting nocturnal acid reflux with associated dyspepsia. She denies CP or SOB. No fevers/chills.     All symptoms wors OVARY/TUBE(S) Bilateral      Comment: Laparoscopic, Dee/Trav  1981 ?: TOTAL ABDOM HYSTERECTOMY  1984: VAGINAL HYSTERECTOMY  No date: YAG IRIDECTOMY - OU - BOTH EYES      Comment: OS 5/10/12, OD 4/19/12   Family Hx:   Family History   Problem Relation Ag Rfl: 1   Insulin Aspart Pen (NOVOLOG FLEXPEN) 100 UNIT/ML Subcutaneous Solution Pen-injector Inject 18 units under the skin three times a day before meals Disp: 20 pen Rfl: 1   Fenofibrate 145 MG Oral Tab Take 1 tablet (145 mg total) by mouth once daily.  D negative for dysuria or gross hematuria  INTEGUMENT/BREAST:  SKIN:  negative for jaundice   ALLERGIC/IMMUNOLOGIC:  +allergies   ENDOCRINE:  negative for cold intolerance and heat intolerance  MUSCULOSKELETAL:  negative for joint effusion/severe erythema + however bi-directional endoscopic evaluation is warranted to w/u worsening GERD, nausea, diarrhea (r/o microscopic colitis with random bxs), malignancy, Celiac Disease, H. Pylori infection or other upper/lower GI pathology.     It is very difficult to focus day of the procedure   -TE to Dr. Ahsan Vyas re: insulin management  -HOLD iron 7 days prior to the procedure  -Change to PPI in the AM  -Continue all other medications as prescribed  -CT if worsening abd pain  -PPI BID if worsening GERD  -Avoid heartburn/diarrh

## 2018-03-30 NOTE — TELEPHONE ENCOUNTER
Please advise on pts diabetic medication instructions prior to scheduled Bi-Directional with MAC on 6/26/18. Thank you.

## 2018-03-30 NOTE — TELEPHONE ENCOUNTER
Scheduled for:  Colonoscopy [address]  Provider Name:   Date: 6/26/18   Location:  [facility]  Sedation: Mac   Time:  0730 / Arrival 0630  Prep: Split dose Colyte , EGD -Npo after 3 hours prior to procedure  Meds/Allergies Reconciled?:   Physician Yassine Syed Split dose  Colyte / Egd And ICD CODE was given and reviewed with pt and stts verbalized understanding of these.

## 2018-03-30 NOTE — TELEPHONE ENCOUNTER
[facility]Pharm calling on a mutual pt regarding rx:Zofran and rx:Venlafaxine, indicates their is an interaction, pls call to advise if its ok to fill,pls call at:[phone],thanks.     Current Outpatient Prescriptions:   •  Ondansetron HCl (ZOFRAN) 4 mg tablet

## 2018-03-30 NOTE — TELEPHONE ENCOUNTER
Please obtain diabetic recommendations from Dr. Da Monge for this patient.  After I left the room, she asked our MA regarding taking glucose tablets if her sugars were to drop - generally we have patients take apple juice or another sugared beverage that is romelia

## 2018-03-31 ENCOUNTER — LAB ENCOUNTER (OUTPATIENT)
Dept: LAB | Age: 63
End: 2018-03-31
Attending: PHYSICIAN ASSISTANT
Payer: COMMERCIAL

## 2018-03-31 DIAGNOSIS — R11.0 NAUSEA: ICD-10-CM

## 2018-03-31 DIAGNOSIS — Z80.0 FAMILY HISTORY OF COLON CANCER: ICD-10-CM

## 2018-03-31 DIAGNOSIS — R19.7 DIARRHEA, UNSPECIFIED TYPE: ICD-10-CM

## 2018-03-31 PROCEDURE — 87493 C DIFF AMPLIFIED PROBE: CPT

## 2018-03-31 PROCEDURE — 87046 STOOL CULTR AEROBIC BACT EA: CPT

## 2018-03-31 PROCEDURE — 87329 GIARDIA AG IA: CPT

## 2018-03-31 PROCEDURE — 87427 SHIGA-LIKE TOXIN AG IA: CPT

## 2018-03-31 PROCEDURE — 87272 CRYPTOSPORIDIUM AG IF: CPT

## 2018-03-31 PROCEDURE — 87045 FECES CULTURE AEROBIC BACT: CPT

## 2018-04-02 ENCOUNTER — PATIENT MESSAGE (OUTPATIENT)
Dept: GASTROENTEROLOGY | Facility: CLINIC | Age: 63
End: 2018-04-02

## 2018-04-02 ENCOUNTER — TELEPHONE (OUTPATIENT)
Dept: GASTROENTEROLOGY | Facility: CLINIC | Age: 63
End: 2018-04-02

## 2018-04-02 NOTE — TELEPHONE ENCOUNTER
Pt is experiencing Side Effect to Dicyclomine - dizziness, shaking, extreme nervous, migraines with aura & nauseated. Also states pharm will not give her Ondansterone as that is a drug interaction. Pls call - aware PB is not in office. Thank you.

## 2018-04-02 NOTE — TELEPHONE ENCOUNTER
LMTCB- emphasized that she is d/c the dicyclomine based on the s/e she is experiencing. Reviewed that if sxs are severe, she is to present to the ED for further evaluation.      Reviewed that PB will review her meds as zofran and venlafaxine together can ca

## 2018-04-02 NOTE — TELEPHONE ENCOUNTER
From: Maureen Jonas  To: Eboni Parra [place]  Sent: 4/2/2018 9:43 AM CDT  Subject: Prescription Question    Emanuel Berg PA-C The Dicycomine is making me very nervous and shaky hot ,and also migraine headaches with auras .  Also pharmacy wouldn’t give me ant

## 2018-04-02 NOTE — TELEPHONE ENCOUNTER
Spoke to [facility] [place] [id number] and she states the interaction b/w zofran and venlafaxine is QT prolongation. Please advise, thank you.

## 2018-04-03 NOTE — TELEPHONE ENCOUNTER
She is pretty sensitive to hypoglycemia so would have her only take 20 units of her normal insulin the evening before (normal 60 units) and no oral insulin medications the day of.

## 2018-04-03 NOTE — TELEPHONE ENCOUNTER
Pt contacted and she states she is NOT on the venlafaxine. States she did pick it up from the pharmacy, but after reading all of the side effects, she decided never to start it. States PCP prescribed it to help with her hot flashes.      States her shakines

## 2018-04-03 NOTE — TELEPHONE ENCOUNTER
ECG Report      Interpretation      --------------------------  Sinus Rhythm   - Nonspecific T-abnormality. Otherwise Normal   ABNORMAL  When compared with ECG of 01/14/04 09:18:49   - Nonspecific T-abnormality.   Electronically signed on 10/26/2017 at 14:

## 2018-04-03 NOTE — TELEPHONE ENCOUNTER
Pt contacted and reviewed Dr. Carmen Kos DM medications below, pt wrote all the instructions down and verbalized understanding, no further questions at this time.

## 2018-04-03 NOTE — TELEPHONE ENCOUNTER
Please advise the patient of the following:  - IF she is not taking the Venlafaxine, OK for Zofran  - If she is taking Venlafaxine, must obtain ECG  - All questions about Venlafaxine in the future (meaning whether or not she should be on it) --> prescribin

## 2018-04-03 NOTE — TELEPHONE ENCOUNTER
Spoke to pt and reviewed PB message below with her in detail. Pt firmly confirmed she is NOT and will NOT take the venlafaxine. I reviewed she is to f/u with pharmacy for  time and cost of the zofran, pt verbalzied understanding of all.     Contacted

## 2018-04-03 NOTE — TELEPHONE ENCOUNTER
See other TE from 3/30/18 for additional information. Pt contacted and she states her shakiness, dizziness, nervousness, migraines, nausea, and abdominal pain resolved since d/c the dicyclomine.  States she never took the venlafaxine RX'd by her PCP for

## 2018-04-13 RX ORDER — GLUCOSAMINE HCL/CHONDROITIN SU 500-400 MG
CAPSULE ORAL
Qty: 120 STRIP | Refills: 2 | Status: SHIPPED
Start: 2018-04-13 | End: 2018-08-01

## 2018-04-13 NOTE — TELEPHONE ENCOUNTER
Blood glucose strips refilled for 3 months.     Diabetes Medications  Protocol Criteria:  · Appointment scheduled in the past 6 months or the next 3 months  · A1C < 7.5 in the past 6 months  · Creatinine in the past 12 months  · Creatinine result < 1.5   Re

## 2018-04-13 NOTE — TELEPHONE ENCOUNTER
From: Yamileth Dexter  Sent: 4/12/2018 4:09 PM CDT  Subject: Medication Renewal Request    Yamileth Dexter would like a refill of the following medications:     Glucose Blood (BLOOD GLUCOSE TEST) In Vitro Strip Rolan Howard MD]    Preferred pharmac

## 2018-04-17 RX ORDER — AMLODIPINE BESYLATE 5 MG/1
5 TABLET ORAL
Qty: 90 TABLET | Refills: 0 | Status: SHIPPED | OUTPATIENT
Start: 2018-04-17 | End: 2018-07-10

## 2018-04-17 NOTE — TELEPHONE ENCOUNTER
Hypertensive Medications  Protocol Criteria:  · Appointment scheduled in the past 6 months or in the next 3 months  · BMP or CMP in the past 12 months  · Creatinine result < 2  Recent Outpatient Visits            2 weeks ago Diarrhea, unspecified type    E

## 2018-04-17 NOTE — TELEPHONE ENCOUNTER
From: Gonsalo Carlson  Sent: 4/17/2018 10:17 AM CDT  Subject: Medication Renewal Request    Gonsalo Carlson would like a refill of the following medications:     AMLODIPINE BESYLATE 5 MG Oral Tab Paul Berrios MD]    Preferred pharmacy: [facility] [id number] [place]

## 2018-04-24 RX ORDER — SIMVASTATIN 20 MG
TABLET ORAL
Qty: 90 TABLET | Refills: 0 | Status: SHIPPED | OUTPATIENT
Start: 2018-04-24 | End: 2018-07-20

## 2018-04-26 RX ORDER — DICYCLOMINE HYDROCHLORIDE 10 MG/1
CAPSULE ORAL
Qty: 120 CAPSULE | Refills: 0 | OUTPATIENT
Start: 2018-04-26

## 2018-04-26 NOTE — TELEPHONE ENCOUNTER
Pending Prescriptions Disp Refills    DICYCLOMINE HCL 10 MG Oral Cap [Pharmacy Med Name: DICYCLOMINE 10 MG CAPSULE] 120 capsule 0     Sig: TAKE ONE CAPSULE BY MOUTH 4 TIMES A DAY BEFORE MEALS AND NIGHTLY         See refill request  Patient last seen 3-30-18.    Medication last refilled 3-30-18

## 2018-04-26 NOTE — TELEPHONE ENCOUNTER
Please see 4/2 email/Umthunzit message. I believe the patient was having a reaction to medication. Was this refill by mistake?

## 2018-05-15 RX ORDER — LISINOPRIL 30 MG/1
TABLET ORAL
Qty: 90 TABLET | Refills: 0 | Status: SHIPPED | OUTPATIENT
Start: 2018-05-15 | End: 2018-06-01

## 2018-05-15 NOTE — TELEPHONE ENCOUNTER
Hypertensive Medications  Protocol Criteria:  · Appointment scheduled in the past 6 months or in the next 3 months  · BMP or CMP in the past 12 months  · Creatinine result < 2  Recent Outpatient Visits            1 month ago Diarrhea, unspecified type    E

## 2018-06-01 RX ORDER — LISINOPRIL 30 MG/1
30 TABLET ORAL
Qty: 90 TABLET | Refills: 0 | Status: SHIPPED
Start: 2018-06-01 | End: 2018-08-25

## 2018-06-01 NOTE — TELEPHONE ENCOUNTER
From: Alivia Sharif  Sent: 6/1/2018 9:08 AM CDT  Subject: Medication Renewal Request    Alivia Sharif would like a refill of the following medications:     MetFORMIN HCl 1000 MG Oral Tab Jenny Citlalli Carlos MD]     LISINOPRIL 30 MG Oral Tab [Omaira Bet

## 2018-06-02 NOTE — TELEPHONE ENCOUNTER
Hypertensive Medications  Protocol Criteria:  · Appointment scheduled in the past 6 months or in the next 3 months  · BMP or CMP in the past 12 months  · Creatinine result < 2  Recent Outpatient Visits            2 months ago Diarrhea, unspecified type months ago Diarrhea, unspecified type    [facility], [address] [place] [address], [address] [place] [address]    Office Visit    2 months ago Incisional hernia, without obstruction or gangrene    [facility] Jonathan Evans MD    Offic

## 2018-06-07 ENCOUNTER — TELEPHONE (OUTPATIENT)
Dept: OBGYN CLINIC | Facility: CLINIC | Age: 63
End: 2018-06-07

## 2018-06-14 ENCOUNTER — OFFICE VISIT (OUTPATIENT)
Dept: FAMILY MEDICINE CLINIC | Facility: CLINIC | Age: 63
End: 2018-06-14

## 2018-06-14 ENCOUNTER — NURSE TRIAGE (OUTPATIENT)
Dept: FAMILY MEDICINE CLINIC | Facility: CLINIC | Age: 63
End: 2018-06-14

## 2018-06-14 VITALS
WEIGHT: 199 LBS | DIASTOLIC BLOOD PRESSURE: 74 MMHG | SYSTOLIC BLOOD PRESSURE: 119 MMHG | HEART RATE: 79 BPM | BODY MASS INDEX: 39 KG/M2

## 2018-06-14 DIAGNOSIS — M62.830 SPASM OF BACK MUSCLES: ICD-10-CM

## 2018-06-14 DIAGNOSIS — Z79.4 TYPE 2 DIABETES MELLITUS WITHOUT COMPLICATION, WITH LONG-TERM CURRENT USE OF INSULIN (HCC): Primary | ICD-10-CM

## 2018-06-14 DIAGNOSIS — I10 ESSENTIAL HYPERTENSION, BENIGN: ICD-10-CM

## 2018-06-14 DIAGNOSIS — E11.9 TYPE 2 DIABETES MELLITUS WITHOUT COMPLICATION, WITH LONG-TERM CURRENT USE OF INSULIN (HCC): Primary | ICD-10-CM

## 2018-06-14 PROCEDURE — 99213 OFFICE O/P EST LOW 20 MIN: CPT | Performed by: FAMILY MEDICINE

## 2018-06-14 PROCEDURE — G0463 HOSPITAL OUTPT CLINIC VISIT: HCPCS | Performed by: FAMILY MEDICINE

## 2018-06-14 RX ORDER — CYCLOBENZAPRINE HCL 10 MG
10 TABLET ORAL 3 TIMES DAILY
Qty: 90 TABLET | Refills: 1 | Status: SHIPPED | OUTPATIENT
Start: 2018-06-14 | End: 2018-07-04

## 2018-06-14 NOTE — TELEPHONE ENCOUNTER
Action Requested: Summary for Provider     []  Critical Lab, Recommendations Needed  [x] Need Additional Advice  []   FYI    []   Need Orders  [] Need Medications Sent to Pharmacy  []  Other     SUMMARY: ADVICE NEEDED, shoulder pain    Pt states for approx

## 2018-06-14 NOTE — PROGRESS NOTES
Jose Kaplan is a 70year old female. Patient presents with:  Shoulder Pain    HPI:   2 weeks - Bilateral shoulder pain and then yesterday went to reach with right arm and had bad spasm in right upper back and into scapula.  Had old norflex and has impr not apply Misc Test fingerstick blood sugar four times daily. Disp: 100 each Rfl: 2   Lancets Does not apply Misc Test blood sugar 4 times daily.  Disp: 100 each Rfl: 1   Insulin Aspart Pen (NOVOLOG FLEXPEN) 100 UNIT/ML Subcutaneous Solution Pen-injector In tobacco: Never Used                      Alcohol use:  No                 REVIEW OF SYSTEMS:   GENERAL HEALTH: feels well otherwise  SKIN: denies any unusual skin lesions or rashes  HEENT: denies eye complaints,denies sore throat, denies ear pain  RESPIRATO

## 2018-06-16 ENCOUNTER — PATIENT MESSAGE (OUTPATIENT)
Dept: FAMILY MEDICINE CLINIC | Facility: CLINIC | Age: 63
End: 2018-06-16

## 2018-06-18 ENCOUNTER — TELEPHONE (OUTPATIENT)
Dept: OTHER | Age: 63
End: 2018-06-18

## 2018-06-18 RX ORDER — TRAMADOL HYDROCHLORIDE 50 MG/1
50 TABLET ORAL EVERY 6 HOURS PRN
Qty: 30 TABLET | Refills: 1 | Status: SHIPPED | OUTPATIENT
Start: 2018-06-18 | End: 2018-07-14

## 2018-06-18 NOTE — TELEPHONE ENCOUNTER
Pt states that she was seen on 6/14/18 and was prescribed cyclobenzaprine for muscle spasm and shoulder pain.  She states that it has not helped She has been taking Tylenol prn with short term relief, using a heating pad and trying to gently move and stretc

## 2018-06-18 NOTE — TELEPHONE ENCOUNTER
Dr Irineo Najjar see pts' message below 6/18/18 and advise on alternative medication.    Please reply to pool: EM RN TRIAGE        Refill Protocol Appointment Criteria  · Appointment scheduled in the past 6 months or in the next 3 months  Recent Outpatient Visits

## 2018-06-18 NOTE — TELEPHONE ENCOUNTER
From: Alejandro Favorite  To:  Inez Baez MD  Sent: 6/16/2018 9:30 AM CDT  Subject: Prescription Question    Doctor Tahira Campuzano don’t seem to be getting much relief from the Cyclobenzaprine 10 mg is there anything else that could be a factor and that hurt m

## 2018-06-19 ENCOUNTER — TELEPHONE (OUTPATIENT)
Dept: GASTROENTEROLOGY | Facility: CLINIC | Age: 63
End: 2018-06-19

## 2018-06-19 NOTE — TELEPHONE ENCOUNTER
Pt has incisional hernia around navel and would like to know if this will affect 6/26/18 colon or EGD at all. Please call.

## 2018-06-19 NOTE — TELEPHONE ENCOUNTER
Pt notified of Dr Healy's recommendations regarding the hernia. She verbalizes understanding.  She is ready to proceed with her procedure

## 2018-06-19 NOTE — TELEPHONE ENCOUNTER
No, an incisional hernia should not affect EGD and colonoscopy exams. Please advise Ms. Partida that it is safe to proceed.

## 2018-06-24 RX ORDER — GLIMEPIRIDE 1 MG/1
TABLET ORAL
Qty: 90 TABLET | Refills: 0 | Status: SHIPPED | OUTPATIENT
Start: 2018-06-24 | End: 2018-09-22

## 2018-06-25 ENCOUNTER — TELEPHONE (OUTPATIENT)
Dept: GASTROENTEROLOGY | Facility: CLINIC | Age: 63
End: 2018-06-25

## 2018-06-25 NOTE — TELEPHONE ENCOUNTER
GI RNs–  Yes, glucose pills are perfectly safe today and tomorrow morning. Usually juice is a better and faster way to get some sugar back into her.     BUT - She was instructed to HOLD oral diabetic medications metformin and glimepiride the day before and

## 2018-06-25 NOTE — TELEPHONE ENCOUNTER
Dr Radames Reeves,    The pt was given your f/u instructions .     Yes, she was given orders to hold her metformin and glimepiride prior to her procedure(day before and day of) and she states she has not been taking Lantus due to low glucose levels

## 2018-06-25 NOTE — TELEPHONE ENCOUNTER
Pt is diabetic. She has an EGD/Colon scheduled for tomorrow    She is wondering if it's ok to take the fast acting glucose tablets to raise glucose levels if she needs to.

## 2018-06-25 NOTE — TELEPHONE ENCOUNTER
Pt has Questions re: diet for 6/26/2018 CLN & EGD - concerns with blood sugars dropping. Pls call. Thank you.

## 2018-06-25 NOTE — TELEPHONE ENCOUNTER
Diabetes Medications  Protocol Criteria:  · Appointment scheduled in the past 6 months or the next 3 months  · A1C < 7.5 in the past 6 months  · Creatinine in the past 12 months  · Creatinine result < 1.5   Recent Outpatient Visits            1 week ago Ty

## 2018-06-26 ENCOUNTER — SURGERY (OUTPATIENT)
Age: 63
End: 2018-06-26

## 2018-06-26 ENCOUNTER — ANESTHESIA EVENT (OUTPATIENT)
Dept: ENDOSCOPY | Facility: HOSPITAL | Age: 63
End: 2018-06-26
Payer: MEDICARE

## 2018-06-26 ENCOUNTER — HOSPITAL ENCOUNTER (OUTPATIENT)
Facility: HOSPITAL | Age: 63
Setting detail: HOSPITAL OUTPATIENT SURGERY
Discharge: HOME | End: 2018-06-26
Attending: INTERNAL MEDICINE | Admitting: INTERNAL MEDICINE
Payer: MEDICARE

## 2018-06-26 ENCOUNTER — ANESTHESIA (OUTPATIENT)
Dept: ENDOSCOPY | Facility: HOSPITAL | Age: 63
End: 2018-06-26
Payer: MEDICARE

## 2018-06-26 VITALS
WEIGHT: 199 LBS | BODY MASS INDEX: 39.07 KG/M2 | HEART RATE: 89 BPM | OXYGEN SATURATION: 98 % | HEIGHT: 60 IN | DIASTOLIC BLOOD PRESSURE: 85 MMHG | SYSTOLIC BLOOD PRESSURE: 135 MMHG | RESPIRATION RATE: 16 BRPM

## 2018-06-26 DIAGNOSIS — K21.9 GASTROESOPHAGEAL REFLUX DISEASE, ESOPHAGITIS PRESENCE NOT SPECIFIED: ICD-10-CM

## 2018-06-26 DIAGNOSIS — Z86.2 HISTORY OF ANEMIA: ICD-10-CM

## 2018-06-26 DIAGNOSIS — R11.0 NAUSEA: ICD-10-CM

## 2018-06-26 DIAGNOSIS — R19.5 HEME + STOOL: ICD-10-CM

## 2018-06-26 DIAGNOSIS — R19.7 DIARRHEA, UNSPECIFIED TYPE: ICD-10-CM

## 2018-06-26 DIAGNOSIS — R10.13 EPIGASTRIC ABDOMINAL PAIN: ICD-10-CM

## 2018-06-26 DIAGNOSIS — R10.84 DIFFUSE ABDOMINAL PAIN: ICD-10-CM

## 2018-06-26 DIAGNOSIS — Z80.0 FAMILY HISTORY OF COLON CANCER: ICD-10-CM

## 2018-06-26 PROCEDURE — 43239 EGD BIOPSY SINGLE/MULTIPLE: CPT | Performed by: INTERNAL MEDICINE

## 2018-06-26 PROCEDURE — 0DB68ZX EXCISION OF STOMACH, VIA NATURAL OR ARTIFICIAL OPENING ENDOSCOPIC, DIAGNOSTIC: ICD-10-PCS | Performed by: INTERNAL MEDICINE

## 2018-06-26 PROCEDURE — 0DBE8ZX EXCISION OF LARGE INTESTINE, VIA NATURAL OR ARTIFICIAL OPENING ENDOSCOPIC, DIAGNOSTIC: ICD-10-PCS | Performed by: INTERNAL MEDICINE

## 2018-06-26 PROCEDURE — 45380 COLONOSCOPY AND BIOPSY: CPT | Performed by: INTERNAL MEDICINE

## 2018-06-26 PROCEDURE — 0DB98ZX EXCISION OF DUODENUM, VIA NATURAL OR ARTIFICIAL OPENING ENDOSCOPIC, DIAGNOSTIC: ICD-10-PCS | Performed by: INTERNAL MEDICINE

## 2018-06-26 RX ORDER — DEXTROSE MONOHYDRATE 25 G/50ML
50 INJECTION, SOLUTION INTRAVENOUS
Status: DISCONTINUED | OUTPATIENT
Start: 2018-06-26 | End: 2018-06-26

## 2018-06-26 RX ORDER — SODIUM CHLORIDE, SODIUM LACTATE, POTASSIUM CHLORIDE, CALCIUM CHLORIDE 600; 310; 30; 20 MG/100ML; MG/100ML; MG/100ML; MG/100ML
INJECTION, SOLUTION INTRAVENOUS CONTINUOUS
Status: DISCONTINUED | OUTPATIENT
Start: 2018-06-26 | End: 2018-06-26

## 2018-06-26 RX ORDER — ONDANSETRON 2 MG/ML
INJECTION INTRAMUSCULAR; INTRAVENOUS AS NEEDED
Status: DISCONTINUED | OUTPATIENT
Start: 2018-06-26 | End: 2018-06-26 | Stop reason: SURG

## 2018-06-26 RX ORDER — LIDOCAINE HYDROCHLORIDE 10 MG/ML
INJECTION, SOLUTION EPIDURAL; INFILTRATION; INTRACAUDAL; PERINEURAL AS NEEDED
Status: DISCONTINUED | OUTPATIENT
Start: 2018-06-26 | End: 2018-06-26 | Stop reason: SURG

## 2018-06-26 RX ORDER — NALOXONE HYDROCHLORIDE 0.4 MG/ML
80 INJECTION, SOLUTION INTRAMUSCULAR; INTRAVENOUS; SUBCUTANEOUS AS NEEDED
Status: DISCONTINUED | OUTPATIENT
Start: 2018-06-26 | End: 2018-06-26

## 2018-06-26 RX ADMIN — ONDANSETRON 4 MG: 2 INJECTION INTRAMUSCULAR; INTRAVENOUS at 07:53:00

## 2018-06-26 RX ADMIN — LIDOCAINE HYDROCHLORIDE 50 MG: 10 INJECTION, SOLUTION EPIDURAL; INFILTRATION; INTRACAUDAL; PERINEURAL at 07:54:00

## 2018-06-26 RX ADMIN — SODIUM CHLORIDE, SODIUM LACTATE, POTASSIUM CHLORIDE, CALCIUM CHLORIDE: 600; 310; 30; 20 INJECTION, SOLUTION INTRAVENOUS at 07:51:00

## 2018-06-26 NOTE — ANESTHESIA PREPROCEDURE EVALUATION
Anesthesia PreOp Note    HPI:     Puneet Cadet is a 70year old female who presents for preoperative consultation requested by: Fany Gonsales MD    Date of Surgery: 6/26/2018    Procedure(s):  COLONOSCOPY  ESOPHAGOGASTRODUODENOSCOPY (EGD) tumor   • PONV (postoperative nausea and vomiting)    • Rheumatoid arthritis ([address]    • Type 1 diabetes mellitus ([address]        Past Surgical History:  2004: BREAST BIOPSY Left      Comment: fibroadenoma  1993: CHOLECYSTECTOMY  1990: COLECTOMY  2007: Tyshawn Cruz Disp: 1 Bottle Rfl: 0 Taking   Insulin Pen Needle (NOVOFINE) 32G X 6 MM Does not apply Misc USE 4 TIMES DAILY Disp: 300 each Rfl: 0 Taking   Venlafaxine HCl 37.5 MG Oral Tab Take 1 tablet (37.5 mg total) by mouth 2 (two) times daily.  Disp: 60 tablet Rfl: 2 (DEX-4) 4-0.006 g chewable tab 4 tablet 4 tablet Oral Q15 Min PRN Less, Alexa Jims, CRNA   Or       dextrose 50 % injection 50 mL 50 mL Intravenous Q15 Min PRN Less, Alexa Escamilla, CRNA   Or       glucose (DEX4) oral liquid 30 g 30 g Oral Q15 Min PRN Less, Alexa Escamilla, CR 38.86 kg/m². height is 1.524 m (5') and weight is 90.3 kg (199 lb). Her blood pressure is 156/99 (abnormal) and her pulse is 118.  Her respiration is 20 and oxygen saturation is 99%.    06/26/18  0716   BP: (!) 156/99   BP Location: Left arm   Pulse: 118 desires the anesthetic management as planned.   Mary Katz  6/26/2018 7:19 AM

## 2018-06-26 NOTE — ANESTHESIA POSTPROCEDURE EVALUATION
Patient:  Priyank Franks    Procedure Summary     Date:  06/26/18 Room / Location:  [facility] ENDOSCOPY 05 / [facility] ENDOSCOPY    Anesthesia Start:  4956 Anesthesia Stop:  9114    Procedures:       COLONOSCOPY (N/A )      ESOPHAGOGASTRODUODENOSCOPY (EGD) (N/A ) Amber

## 2018-06-26 NOTE — OPERATIVE REPORT
EGD AND COLONOSCOPY PROCEDURE REPORTS:    DATE OF PROCEDURE:  6/26/2018    PCP: Cristopher Arias MD     PREOPERATIVE DIAGNOSIS:  GERD symptoms greater than 5 years, nausea, dyspepsia, epigastric and multifocal abdominal pain, change in bowel patterns,  showed no masses. The Olympus pediatric video colonoscope was advanced under direct visualization through the entire length of the colon to the cecum and terminal ileum.   Retroflexed exam performed up the ascending colon around the hepatic flexure to the

## 2018-06-26 NOTE — H&P
History & Physical Examination    Patient Name: Geo Gustafson  MRN: [id number]  [facility]: [id number]  YOB: 1955    Diagnosis: GERD symptoms greater than 5 years, nausea, dyspepsia, epigastric and multifocal abdominal pain, change in bowel patte PRN and anti-reflux precautions.      #Diffuse Abd Pain: low threshold for CT - not very tender today in office, have advised patient to call if pain worsens. May be 2/2 hernia, in addition to adhesions/post-surgical changes.  Cannot exclude colon or Ana Larry Ondansetron HCl (ZOFRAN) 4 mg tablet Take 1 tablet (4 mg total) by mouth every 12 (twelve) hours as needed for Nausea.  Disp: 30 tablet Rfl: 0 6/12/2018   PEG 3350-KCl-Na Bicarb-NaCl (TRILYTE) 420 g Oral Recon Soln Take as directed - split dose preparatio breakfast.   Disp:  Rfl:  6/24/2018       Current Facility-Administered Medications:  lactated ringers infusion  Intravenous Continuous   glucose (DEX4) oral liquid 15 g 15 g Oral Q15 Min PRN   Or      Glucose-Vitamin C (DEX-4) 4-0.006 g chewable tab 4 tab Laparoscopic, [address] [facility] ?: TOTAL ABDOM HYSTERECTOMY  1984: VAGINAL HYSTERECTOMY  No date: YAG IRIDECTOMY - OU - BOTH EYES      Comment: OS 5/10/12, OD 4/19/12  Family History   Problem Relation Age of Onset   • Diabetes Father    • Prostate Cancer

## 2018-07-02 ENCOUNTER — TELEPHONE (OUTPATIENT)
Dept: GASTROENTEROLOGY | Facility: CLINIC | Age: 63
End: 2018-07-02

## 2018-07-02 NOTE — TELEPHONE ENCOUNTER
Final Diagnosis:      A. Duodenum; biopsy:  · Small intestinal mucosa without significant histopathology. · Intact villous architecture identified.     B.   Stomach; random biopsy:  · Oxyntic and antral mucosa demonstrating mild chronic inactive gastritis

## 2018-07-03 NOTE — TELEPHONE ENCOUNTER
GI RNs–  Please call Ms. Partida to advise that the EGD and colonoscopy examinations of 6/26/2018 showed:  · Small \"hiatal hernia\" which can cause GERD symptoms  · Mild gastritis irritation of the stomach  · Healthy colonoscopy examination with no coli

## 2018-07-03 NOTE — TELEPHONE ENCOUNTER
Spoke to the pt. She was given all of Dr Healy's f/u instructions and she verbalizes understanding.  She will let me know if cutting back on the Metformin helps her diarrhea    Colon recall entered for 10 years

## 2018-07-06 RX ORDER — NAPROXEN SODIUM 220 MG
TABLET ORAL
Qty: 100 EACH | Refills: 2 | Status: SHIPPED | OUTPATIENT
Start: 2018-07-06 | End: 2019-09-10

## 2018-07-06 RX ORDER — FENOFIBRATE 145 MG/1
145 TABLET, COATED ORAL
Qty: 90 TABLET | Refills: 0 | Status: SHIPPED | OUTPATIENT
Start: 2018-07-06 | End: 2019-01-05

## 2018-07-06 NOTE — TELEPHONE ENCOUNTER
From: Maciel Schulte  Sent: 7/6/2018 1:06 PM CDT  Subject: Medication Renewal Request    Maciel Schulte would like a refill of the following medications:     Fenofibrate 145 MG Oral Tab Kalie Oakes MD]     Insulin Syringe 30G X 5/16\" 0.5 ML Araujo

## 2018-07-07 NOTE — TELEPHONE ENCOUNTER
Cholesterol Medications  Protocol Criteria:  · Appointment scheduled in the past 12 months or in the next 3 months  · ALT & LDL on file in the past 12 months  · ALT result < 80  · LDL result <130   Recent Outpatient Visits            3 weeks ago Type 2 ivonne ago Ovarian tumor (benign), unspecified laterality    [facility], [place], Evalina Meigs, MD    Office Visit

## 2018-07-10 RX ORDER — AMLODIPINE BESYLATE 5 MG/1
TABLET ORAL
Qty: 90 TABLET | Refills: 0 | Status: SHIPPED | OUTPATIENT
Start: 2018-07-10 | End: 2018-10-07

## 2018-07-11 NOTE — TELEPHONE ENCOUNTER
Hypertensive Medications  Protocol Criteria:  · Appointment scheduled in the past 6 months or in the next 3 months  · BMP or CMP in the past 12 months  · Creatinine result < 2  Recent Outpatient Visits            3 weeks ago Type 2 diabetes mellitus withou

## 2018-07-13 ENCOUNTER — TELEPHONE (OUTPATIENT)
Dept: OTHER | Age: 63
End: 2018-07-13

## 2018-07-13 NOTE — TELEPHONE ENCOUNTER
Appointment scheduled for tomorrow  7/14/18. Is this ok? Please advise. Patient was seen on 6/14/18 for right shoulder spasms. She was given Cyclobenzaprine HCl 10 MG Oral Tab  Which helped for 2 weeks.        She called back and was given Trama

## 2018-07-14 ENCOUNTER — HOSPITAL ENCOUNTER (OUTPATIENT)
Dept: GENERAL RADIOLOGY | Age: 63
Discharge: HOME | End: 2018-07-14
Attending: FAMILY MEDICINE | Admitting: FAMILY MEDICINE
Payer: MEDICARE

## 2018-07-14 ENCOUNTER — OFFICE VISIT (OUTPATIENT)
Dept: FAMILY MEDICINE CLINIC | Facility: CLINIC | Age: 63
End: 2018-07-14

## 2018-07-14 VITALS
HEART RATE: 90 BPM | DIASTOLIC BLOOD PRESSURE: 80 MMHG | SYSTOLIC BLOOD PRESSURE: 120 MMHG | WEIGHT: 200 LBS | BODY MASS INDEX: 39 KG/M2

## 2018-07-14 DIAGNOSIS — M19.019 ARTHRITIS, SHOULDER REGION: Primary | ICD-10-CM

## 2018-07-14 DIAGNOSIS — M67.911 ROTATOR CUFF DISORDER, RIGHT: ICD-10-CM

## 2018-07-14 DIAGNOSIS — M75.21 BICEPS TENDINITIS OF RIGHT UPPER EXTREMITY: ICD-10-CM

## 2018-07-14 DIAGNOSIS — M19.019 ARTHRITIS, SHOULDER REGION: ICD-10-CM

## 2018-07-14 PROCEDURE — 99213 OFFICE O/P EST LOW 20 MIN: CPT | Performed by: FAMILY MEDICINE

## 2018-07-14 PROCEDURE — 73030 X-RAY EXAM OF SHOULDER: CPT | Performed by: FAMILY MEDICINE

## 2018-07-14 PROCEDURE — G0463 HOSPITAL OUTPT CLINIC VISIT: HCPCS | Performed by: FAMILY MEDICINE

## 2018-07-14 RX ORDER — DIAZEPAM 5 MG/1
5 TABLET ORAL EVERY 6 HOURS PRN
Qty: 30 TABLET | Refills: 0 | Status: SHIPPED | OUTPATIENT
Start: 2018-07-14 | End: 2019-06-13 | Stop reason: ALTCHOICE

## 2018-07-14 RX ORDER — LIDOCAINE 50 MG/G
OINTMENT TOPICAL
Qty: 50 G | Refills: 1 | Status: SHIPPED | OUTPATIENT
Start: 2018-07-14 | End: 2018-07-23

## 2018-07-14 NOTE — PROGRESS NOTES
Limited movement   Right shoulder pain  cyclobenzaprene didn't help  Tramadol doses it made me too hyper and too nervous  The spasms got worse  No work outside home  right handed.     Patient has severe problems with emesis with any sort of narcotic    Phys

## 2018-07-20 RX ORDER — PANTOPRAZOLE SODIUM 40 MG/1
TABLET, DELAYED RELEASE ORAL
Qty: 90 TABLET | Refills: 3 | Status: SHIPPED | OUTPATIENT
Start: 2018-07-20 | End: 2019-07-05

## 2018-07-21 RX ORDER — SIMVASTATIN 20 MG
TABLET ORAL
Qty: 90 TABLET | Refills: 0 | Status: SHIPPED | OUTPATIENT
Start: 2018-07-21 | End: 2018-10-18

## 2018-07-21 NOTE — TELEPHONE ENCOUNTER
Gastrointestional Medication:  Refill Protocol Appointment Criteria  · Appointment scheduled in the past 12 months or in the next 3 months  Recent Outpatient Visits            6 days ago Arthritis, shoulder region    [address] [facility]

## 2018-07-21 NOTE — TELEPHONE ENCOUNTER
Refill passed per [facility], [facility] protocol.   Cholesterol Medications  Protocol Criteria:  · Appointment scheduled in the past 12 months or in the next 3 months  · ALT & LDL on file in the past 12 months  · ALT result < 80  · LDL result <130   Recent Outpat

## 2018-07-23 ENCOUNTER — OFFICE VISIT (OUTPATIENT)
Dept: ORTHOPEDICS CLINIC | Facility: CLINIC | Age: 63
End: 2018-07-23
Payer: MEDICARE

## 2018-07-23 DIAGNOSIS — S46.811A STRAIN OF RIGHT TRAPEZIUS MUSCLE, INITIAL ENCOUNTER: Primary | ICD-10-CM

## 2018-07-23 PROCEDURE — 99203 OFFICE O/P NEW LOW 30 MIN: CPT | Performed by: ORTHOPAEDIC SURGERY

## 2018-07-23 PROCEDURE — G0463 HOSPITAL OUTPT CLINIC VISIT: HCPCS | Performed by: ORTHOPAEDIC SURGERY

## 2018-07-23 RX ORDER — MELOXICAM 15 MG/1
15 TABLET ORAL DAILY
Qty: 30 TABLET | Refills: 1 | Status: SHIPPED | OUTPATIENT
Start: 2018-07-23 | End: 2019-08-26

## 2018-07-23 NOTE — H&P
Chief Complaint: Right shoulder pain    NURSING INTAKE COMMENTS: Patient presents with:  Shoulder Pain: Right- pt states pain stated 6 wks ago. pt denies any injuy. pt had XR. pt states she has pain at night. History of present illness:   This 65 yea Breast Cancer Mother    • Cataracts Mother    • Cancer Mother         • Lipids Sister    • Lipids Sister    • Lipids Sister    • Lipids Sister    • Cancer Paternal Grandmother      uterine cancer   • Colon Cancer Paternal Uncle    • Diabetes Sister

## 2018-07-27 ENCOUNTER — PATIENT MESSAGE (OUTPATIENT)
Dept: ORTHOPEDICS CLINIC | Facility: CLINIC | Age: 63
End: 2018-07-27

## 2018-07-27 NOTE — TELEPHONE ENCOUNTER
From: Sarah Reid  To:  Jayda Chang MD  Sent: 7/27/2018 8:59 AM CDT  Subject: Non-Urgent Medical Question    Medication is working well Meloxicam 15 mgs But I was wondering how many sessions of physical therapy do you think I need please let m

## 2018-07-31 NOTE — TELEPHONE ENCOUNTER
Late entry, chart reviewed and pt completed stress est 11/3/17 results normal and cleared for surgery by Dr Chadd Winkler. Surgery done by Dr Ronn Martínez on 11/4/17 see encounter notes.

## 2018-08-01 ENCOUNTER — TELEPHONE (OUTPATIENT)
Dept: FAMILY MEDICINE CLINIC | Facility: CLINIC | Age: 63
End: 2018-08-01

## 2018-08-01 RX ORDER — BLOOD SUGAR DIAGNOSTIC
STRIP MISCELLANEOUS
Qty: 100 STRIP | Refills: 2 | Status: SHIPPED | OUTPATIENT
Start: 2018-08-01 | End: 2018-08-02

## 2018-08-02 RX ORDER — BLOOD SUGAR DIAGNOSTIC
STRIP MISCELLANEOUS
Qty: 100 STRIP | Refills: 2 | Status: SHIPPED | OUTPATIENT
Start: 2018-08-02 | End: 2018-10-13

## 2018-08-03 ENCOUNTER — TELEPHONE (OUTPATIENT)
Dept: FAMILY MEDICINE CLINIC | Facility: CLINIC | Age: 63
End: 2018-08-03

## 2018-08-03 NOTE — TELEPHONE ENCOUNTER
Pt's pharmacy called in requesting the ICD-10 code for the following Rx:  Please advise       Current Outpatient Prescriptions:   •  Glucose Blood (ACCU-CHEK ELAINE PLUS) In Vitro Strip, USE TO TEST BLOOD SUGAR 4 TIMES DAILY. , Disp: 100 strip, Rfl: 2

## 2018-08-07 ENCOUNTER — TELEPHONE (OUTPATIENT)
Dept: OTHER | Age: 63
End: 2018-08-07

## 2018-08-07 DIAGNOSIS — K43.2 INCISIONAL HERNIA, WITHOUT OBSTRUCTION OR GANGRENE: Primary | ICD-10-CM

## 2018-08-07 NOTE — TELEPHONE ENCOUNTER
Spoke with patient--she is requesting LB advice for a second opinion to see another surgeon other than Dr. Fallon Ibarra. Patient states, \"Dr. Fallon Ibarra is insistent on using mesh for my incisional hernia.  I have a re-consult appointment with him 8/14/18 and his

## 2018-08-08 ENCOUNTER — PATIENT MESSAGE (OUTPATIENT)
Dept: FAMILY MEDICINE CLINIC | Facility: CLINIC | Age: 63
End: 2018-08-08

## 2018-08-09 NOTE — TELEPHONE ENCOUNTER
Reviewed doctor's recommendations with pt, pt will contact Dr. Sherren Bair office for appt if needed.

## 2018-08-11 NOTE — TELEPHONE ENCOUNTER
Lantus 100 unit/Ml PASSED Diabetes Protocol. Rx filled per protocol. No further action required at this time.      Diabetes Medications  Protocol Criteria:  · Appointment scheduled in the past 6 months or the next 3 months  · A1C < 7.5 in the past 6 months

## 2018-08-11 NOTE — TELEPHONE ENCOUNTER
From: Alivia Sharif  To:  Joe Rodriguez MD  Sent: 8/8/2018 9:12 AM CDT  Subject: Prescription Question    Dr Albert Chamorro, I need a refill on my Lantus 100 unit/Ml vial it should be inject 60 units Subq daily as directed Qty 10 of 60 or in other words a 90 d

## 2018-08-14 ENCOUNTER — OFFICE VISIT (OUTPATIENT)
Dept: SURGERY | Facility: CLINIC | Age: 63
End: 2018-08-14
Payer: MEDICARE

## 2018-08-14 DIAGNOSIS — K43.2 INCISIONAL HERNIA WITHOUT OBSTRUCTION OR GANGRENE: Primary | ICD-10-CM

## 2018-08-14 PROCEDURE — 99214 OFFICE O/P EST MOD 30 MIN: CPT | Performed by: SURGERY

## 2018-08-14 PROCEDURE — G0463 HOSPITAL OUTPT CLINIC VISIT: HCPCS | Performed by: SURGERY

## 2018-08-15 NOTE — H&P
History and Physical      Vj Greer is a 70year old female. HPI   Patient presents with:  Hernia: Pt states she completed endoscopy w/ bx, colonoscopy w/ bx & f/u w/ Dr. Maxx Collazo. Pt is now ready to schedule incisional hernia surgery.    Pt c/o Subcutaneous Solution 60 u sq daily. Disp: 30 mL Rfl: 1   Glucose Blood (ACCU-CHEK ELAINE PLUS) In Vitro Strip USE TO TEST BLOOD SUGAR 4 TIMES DAILY. Disp: 100 strip Rfl: 2   Meloxicam 15 MG Oral Tab Take 1 tablet (15 mg total) by mouth daily.  Disp: 30 tabl tablet Rfl: 11   Vitamin B-12 (VITAMIN B12) 1000 MCG Oral Tab Take 1 tablet by mouth once daily.  Disp: 30 tablet Rfl: 2   Ferrous Sulfate (KP FERROUS SULFATE) 325 (65 FE) MG Oral Tab Take 1 tablet by mouth daily with breakfast.   Disp:  Rfl:        Manuel Anderson normocephalic  Nose/Mouth/Throat: nose and throat are clear palate is intact mucous membranes are moist no oral lesions are noted  Neck/Thyroid: neck is supple without adenopathy  Respiratory: normal to inspection lungs are clear to auscultation bilaterall

## 2018-08-25 NOTE — TELEPHONE ENCOUNTER
From: Linnea Justice  Sent: 8/25/2018 11:57 AM CDT  Subject: Medication Renewal Request    Linnea Justice would like a refill of the following medications:     lisinopril 30 MG Oral Tab Jeffrey Carlos MD]    Preferred pharmacy: [facility] [id number] [place] [employer]

## 2018-08-27 RX ORDER — LISINOPRIL 30 MG/1
30 TABLET ORAL
Qty: 90 TABLET | Refills: 0 | Status: SHIPPED
Start: 2018-08-27 | End: 2018-11-22

## 2018-08-27 NOTE — TELEPHONE ENCOUNTER
LISINOPRIL Medication refilled for 90 days per protocol.   Hypertensive Medications  Protocol Criteria:  · Appointment scheduled in the past 6 months or in the next 3 months  · BMP or CMP in the past 12 months  · Creatinine result < 2  Recent Outpatient Vis

## 2018-09-18 RX ORDER — LISINOPRIL 30 MG/1
30 TABLET ORAL
Qty: 90 TABLET | Refills: 0 | OUTPATIENT
Start: 2018-09-18

## 2018-09-25 RX ORDER — GLIMEPIRIDE 1 MG/1
TABLET ORAL
Qty: 90 TABLET | Refills: 4 | Status: SHIPPED | OUTPATIENT
Start: 2018-09-25 | End: 2019-05-06

## 2018-10-06 ENCOUNTER — IMMUNIZATION (OUTPATIENT)
Dept: FAMILY MEDICINE CLINIC | Facility: CLINIC | Age: 63
End: 2018-10-06
Payer: MEDICARE

## 2018-10-06 PROCEDURE — G0008 ADMIN INFLUENZA VIRUS VAC: HCPCS | Performed by: FAMILY MEDICINE

## 2018-10-06 PROCEDURE — 90653 IIV ADJUVANT VACCINE IM: CPT | Performed by: FAMILY MEDICINE

## 2018-10-08 RX ORDER — AMLODIPINE BESYLATE 5 MG/1
5 TABLET ORAL
Qty: 90 TABLET | Refills: 0 | Status: SHIPPED | OUTPATIENT
Start: 2018-10-08 | End: 2019-01-05

## 2018-10-08 NOTE — TELEPHONE ENCOUNTER
Requesting Amlodipine refill    Refill passed per [facility], [facility] protocol.     Hypertensive Medications  Protocol Criteria:  · Appointment scheduled in the past 6 months or in the next 3 months  · BMP or CMP in the past 12 months  · Creatinine result < 2

## 2018-10-15 ENCOUNTER — TELEPHONE (OUTPATIENT)
Dept: OTHER | Age: 63
End: 2018-10-15

## 2018-10-15 RX ORDER — BLOOD SUGAR DIAGNOSTIC
STRIP MISCELLANEOUS
Qty: 200 STRIP | Refills: 3 | Status: SHIPPED | OUTPATIENT
Start: 2018-10-15 | End: 2019-03-27

## 2018-10-15 NOTE — TELEPHONE ENCOUNTER
Refill passed per [facility], [facility] protocol.   Diabetes Medications  Protocol Criteria:  · Appointment scheduled in the past 6 months or the next 3 months  · A1C < 7.5 in the past 6 months  · Creatinine in the past 12 months  · Creatinine result < 1.5   Rece

## 2018-10-15 NOTE — TELEPHONE ENCOUNTER
PA for Lantus solostar 100 units/ml subcutaneous solution completed with [employer] via CMM response time 1-5 business days [address] [facility].

## 2018-10-15 NOTE — TELEPHONE ENCOUNTER
Received request from Jose Infante asking for pro-active PA for Lantus insulin as prescribed by doctor. States med is currently covered under pt's Medicare plan but will not be covered starting 1/1/19. Information faxed to Dr. Kali Herrera office.      PA line c

## 2018-10-17 ENCOUNTER — TELEPHONE (OUTPATIENT)
Dept: FAMILY MEDICINE CLINIC | Facility: CLINIC | Age: 63
End: 2018-10-17

## 2018-10-18 RX ORDER — SIMVASTATIN 20 MG
TABLET ORAL
Qty: 90 TABLET | Refills: 0 | Status: SHIPPED | OUTPATIENT
Start: 2018-10-18 | End: 2019-01-15

## 2018-10-18 NOTE — TELEPHONE ENCOUNTER
Refill passed per [facility], [facility] protocol.     Cholesterol Medications  Protocol Criteria:  · Appointment scheduled in the past 12 months or in the next 3 months  · ALT & LDL on file in the past 12 months  · ALT result < 80  · LDL result <130   Recent Outp

## 2018-11-23 RX ORDER — LISINOPRIL 30 MG/1
30 TABLET ORAL
Qty: 90 TABLET | Refills: 0 | Status: SHIPPED | OUTPATIENT
Start: 2018-11-23 | End: 2019-04-28

## 2018-11-29 ENCOUNTER — NURSE TRIAGE (OUTPATIENT)
Dept: OTHER | Age: 63
End: 2018-11-29

## 2018-11-29 NOTE — TELEPHONE ENCOUNTER
[id number] [address] This can also be related to the incisional hernia that I do not believe was repaired - saw Dr. Greyson Plaza for this. Can see myself or Dr. Frederic Thurman.  Make sure she is taking the protonix regularly

## 2018-11-29 NOTE — TELEPHONE ENCOUNTER
Spoke with patient and informed her of Dr. Zhanna Olson orders and plan of care. Patient confirmed she is taking the Protonix regularly, but she finds that TUMS helps her better. Patient was advised to go to the ER immediately if her symptoms get worse.  Neisha

## 2018-12-01 ENCOUNTER — OFFICE VISIT (OUTPATIENT)
Dept: FAMILY MEDICINE CLINIC | Facility: CLINIC | Age: 63
End: 2018-12-01
Payer: MEDICARE

## 2018-12-01 VITALS
HEART RATE: 79 BPM | BODY MASS INDEX: 41.03 KG/M2 | WEIGHT: 209 LBS | TEMPERATURE: 98 F | HEIGHT: 60 IN | SYSTOLIC BLOOD PRESSURE: 129 MMHG | DIASTOLIC BLOOD PRESSURE: 79 MMHG

## 2018-12-01 DIAGNOSIS — K43.2 INCISIONAL HERNIA, WITHOUT OBSTRUCTION OR GANGRENE: ICD-10-CM

## 2018-12-01 DIAGNOSIS — R31.9 HEMATURIA, UNSPECIFIED TYPE: Primary | ICD-10-CM

## 2018-12-01 DIAGNOSIS — E11.9 TYPE 2 DIABETES MELLITUS WITHOUT COMPLICATION, WITHOUT LONG-TERM CURRENT USE OF INSULIN (HCC): ICD-10-CM

## 2018-12-01 DIAGNOSIS — R10.13 EPIGASTRIC PAIN: ICD-10-CM

## 2018-12-01 DIAGNOSIS — Z12.31 SCREENING MAMMOGRAM, ENCOUNTER FOR: ICD-10-CM

## 2018-12-01 DIAGNOSIS — I10 ESSENTIAL HYPERTENSION, BENIGN: ICD-10-CM

## 2018-12-01 PROCEDURE — G0463 HOSPITAL OUTPT CLINIC VISIT: HCPCS | Performed by: FAMILY MEDICINE

## 2018-12-01 PROCEDURE — 99214 OFFICE O/P EST MOD 30 MIN: CPT | Performed by: FAMILY MEDICINE

## 2018-12-01 PROCEDURE — 81002 URINALYSIS NONAUTO W/O SCOPE: CPT | Performed by: FAMILY MEDICINE

## 2018-12-01 NOTE — PROGRESS NOTES
Martha Mcgraw is a 70year old female. Patient presents with:  Abdominal Pain: Pt c/o intermittent epigastric pains most of the time after meals consumption. Also c/o episodes of diarrhea alternating with constipation.  Recently diagnosed with Hiatal Her BY MOUTH ONE TIME DAILY Disp: 90 tablet Rfl: 3   Fenofibrate 145 MG Oral Tab Take 1 tablet (145 mg total) by mouth once daily. Disp: 90 tablet Rfl: 0   Insulin Syringe 30G X 5/16\" 0.5 ML Does not apply Misc Test fingerstick blood sugar four times daily.  D Steroid cell tumor   • PONV (postoperative nausea and vomiting)    • Rheumatoid arthritis ([facility]    • Type 1 diabetes mellitus ([facility] 2001      Social History:  Social History    Tobacco Use      Smoking status: Never Smoker      Smokeless tobacco: Never Use obstruction or gangrene    - SURGERY - INTERNAL    6. Screening mammogram, encounter for    - [facility] SCREENING BILAT (CPT=77067); Future        The patient indicates understanding of these issues and agrees to the plan.       Dewey Shone, MD  12/1/2018  12

## 2019-01-03 ENCOUNTER — TELEPHONE (OUTPATIENT)
Dept: OTHER | Age: 64
End: 2019-01-03

## 2019-01-03 NOTE — TELEPHONE ENCOUNTER
Patient calling with labs and is asking her labs and referral are still ok to use. I instructed they were. She asked about Medicare covering and I stated she would have to call medicare with understanding.

## 2019-01-05 ENCOUNTER — LAB ENCOUNTER (OUTPATIENT)
Dept: LAB | Age: 64
End: 2019-01-05
Attending: FAMILY MEDICINE
Payer: MEDICARE

## 2019-01-05 DIAGNOSIS — E11.9 TYPE 2 DIABETES MELLITUS WITHOUT COMPLICATION, WITHOUT LONG-TERM CURRENT USE OF INSULIN (HCC): ICD-10-CM

## 2019-01-05 DIAGNOSIS — E78.2 MIXED HYPERLIPIDEMIA: ICD-10-CM

## 2019-01-05 LAB
ALBUMIN SERPL BCP-MCNC: 3.8 G/DL (ref 3.5–4.8)
ALBUMIN/GLOB SERPL: 1.4 {RATIO} (ref 1–2)
ALP SERPL-CCNC: 42 U/L (ref 32–100)
ALT SERPL-CCNC: 27 U/L (ref 14–54)
ANION GAP SERPL CALC-SCNC: 8 MMOL/L (ref 0–18)
AST SERPL-CCNC: 27 U/L (ref 15–41)
BASOPHILS # BLD: 0 K/UL (ref 0–0.2)
BASOPHILS NFR BLD: 1 %
BILIRUB SERPL-MCNC: 0.6 MG/DL (ref 0.3–1.2)
BUN SERPL-MCNC: 15 MG/DL (ref 8–20)
BUN/CREAT SERPL: 17.4 (ref 10–20)
CALCIUM SERPL-MCNC: 9.6 MG/DL (ref 8.5–10.5)
CHLORIDE SERPL-SCNC: 105 MMOL/L (ref 95–110)
CHOLEST SERPL-MCNC: 128 MG/DL (ref 110–200)
CO2 SERPL-SCNC: 27 MMOL/L (ref 22–32)
CREAT SERPL-MCNC: 0.86 MG/DL (ref 0.5–1.5)
EOSINOPHIL # BLD: 0.3 K/UL (ref 0–0.7)
EOSINOPHIL NFR BLD: 4 %
ERYTHROCYTE [DISTWIDTH] IN BLOOD BY AUTOMATED COUNT: 13.2 % (ref 11–15)
EST. AVERAGE GLUCOSE BLD GHB EST-MCNC: 163 MG/DL (ref 68–126)
GLOBULIN PLAS-MCNC: 2.8 G/DL (ref 2.5–3.7)
GLUCOSE SERPL-MCNC: 168 MG/DL (ref 70–99)
HBA1C MFR BLD HPLC: 7.3 % (ref ?–5.7)
HCT VFR BLD AUTO: 39 % (ref 35–48)
HDLC SERPL-MCNC: 38 MG/DL
HGB BLD-MCNC: 13 G/DL (ref 12–16)
LDLC SERPL CALC-MCNC: 59 MG/DL (ref 0–99)
LYMPHOCYTES # BLD: 2.5 K/UL (ref 1–4)
LYMPHOCYTES NFR BLD: 34 %
MCH RBC QN AUTO: 29.1 PG (ref 27–32)
MCHC RBC AUTO-ENTMCNC: 33.5 G/DL (ref 32–37)
MCV RBC AUTO: 87.1 FL (ref 80–100)
MONOCYTES # BLD: 0.3 K/UL (ref 0–1)
MONOCYTES NFR BLD: 5 %
NEUTROPHILS # BLD AUTO: 4.1 K/UL (ref 1.8–7.7)
NEUTROPHILS NFR BLD: 57 %
NONHDLC SERPL-MCNC: 90 MG/DL
OSMOLALITY UR CALC.SUM OF ELEC: 295 MOSM/KG (ref 275–295)
PATIENT FASTING: YES
PLATELET # BLD AUTO: 352 K/UL (ref 140–400)
PMV BLD AUTO: 8.8 FL (ref 7.4–10.3)
POTASSIUM SERPL-SCNC: 3.9 MMOL/L (ref 3.3–5.1)
PROT SERPL-MCNC: 6.6 G/DL (ref 5.9–8.4)
RBC # BLD AUTO: 4.47 M/UL (ref 3.7–5.4)
SODIUM SERPL-SCNC: 140 MMOL/L (ref 136–144)
TRIGL SERPL-MCNC: 153 MG/DL (ref 1–149)
TSH SERPL-ACNC: 2.97 UIU/ML (ref 0.45–5.33)
WBC # BLD AUTO: 7.2 K/UL (ref 4–11)

## 2019-01-05 PROCEDURE — 84443 ASSAY THYROID STIM HORMONE: CPT

## 2019-01-05 PROCEDURE — 80061 LIPID PANEL: CPT

## 2019-01-05 PROCEDURE — 36415 COLL VENOUS BLD VENIPUNCTURE: CPT

## 2019-01-05 PROCEDURE — 83036 HEMOGLOBIN GLYCOSYLATED A1C: CPT

## 2019-01-05 PROCEDURE — 85025 COMPLETE CBC W/AUTO DIFF WBC: CPT

## 2019-01-05 PROCEDURE — 80053 COMPREHEN METABOLIC PANEL: CPT

## 2019-01-07 RX ORDER — AMLODIPINE BESYLATE 5 MG/1
TABLET ORAL
Qty: 90 TABLET | Refills: 0 | Status: SHIPPED | OUTPATIENT
Start: 2019-01-07 | End: 2019-03-31

## 2019-01-07 RX ORDER — FENOFIBRATE 145 MG/1
TABLET, COATED ORAL
Qty: 90 TABLET | Refills: 0 | Status: SHIPPED | OUTPATIENT
Start: 2019-01-07 | End: 2019-03-31

## 2019-01-09 NOTE — PROGRESS NOTES
Angy - Your labs were all stable except for the diabetes - mild worsening. Hemoglobin A1C 7.3. - may have been due to the holidays.  No changes to your medications right now and send me your readings in 2 weeks. - Dr. Veloz End

## 2019-01-15 RX ORDER — SIMVASTATIN 20 MG
TABLET ORAL
Qty: 90 TABLET | Refills: 0 | Status: SHIPPED | OUTPATIENT
Start: 2019-01-15 | End: 2019-04-08

## 2019-01-17 ENCOUNTER — OFFICE VISIT (OUTPATIENT)
Dept: SURGERY | Facility: CLINIC | Age: 64
End: 2019-01-17
Payer: MEDICARE

## 2019-01-17 VITALS — HEIGHT: 60 IN | BODY MASS INDEX: 41.03 KG/M2 | WEIGHT: 209 LBS

## 2019-01-17 DIAGNOSIS — K43.2 INCISIONAL HERNIA, WITHOUT OBSTRUCTION OR GANGRENE: Primary | ICD-10-CM

## 2019-01-17 PROCEDURE — G0463 HOSPITAL OUTPT CLINIC VISIT: HCPCS | Performed by: SURGERY

## 2019-01-17 PROCEDURE — 99214 OFFICE O/P EST MOD 30 MIN: CPT | Performed by: SURGERY

## 2019-01-20 NOTE — PROGRESS NOTES
HPI:    Patient ID: Jose Kaplan is a 70year old female presenting with Patient presents with:  Hernia: Pt referred by Dr. Gloria Oliveira regarding incisional hernia. Pt c/o increased pain and swelling to area.   Pt states anything bothers her abdomin, she need • Cancer Paternal Grandmother         uterine cancer   • Colon Cancer Paternal Uncle    • Diabetes Sister    • Hypertension Sister      Social History    Socioeconomic History      Marital status:       Spouse name: Not on file      Number of childr Insulin Pen Needle (NOVOFINE) 32G X 6 MM Does not apply Misc USE 4 TIMES DAILY Disp: 300 each Rfl: 0   AMLODIPINE BESYLATE 5 MG Oral Tab TAKE 1 TABLET BY MOUTH EVERY DAY Disp: 90 tablet Rfl: 0   FENOFIBRATE 145 MG Oral Tab TAKE 1 TABLET BY MOUTH EVERY DAY Insulin Aspart Pen (NOVOLOG FLEXPEN) 100 UNIT/ML Subcutaneous Solution Pen-injector Inject 18 units under the skin three times a day before meals Disp: 20 pen Rfl: 1   ALPRAZolam 0.25 MG Oral Tab 1 TABLET 2 TIMES DAILY AS NEEDED Disp: 30 tablet Rfl: 5   In Incisional hernia, without obstruction or gangrene    Small with incarcerated preperitoneal fat. Discussed w pt the option of continued observation versus elective hernia repair with mesh.   Since there is very little risk of bowel herniation, pt wants to

## 2019-02-06 ENCOUNTER — PATIENT MESSAGE (OUTPATIENT)
Dept: FAMILY MEDICINE CLINIC | Facility: CLINIC | Age: 64
End: 2019-02-06

## 2019-02-06 NOTE — TELEPHONE ENCOUNTER
From: Nellie Pedraza  To:  Letty Sanchez MD  Sent: 2/6/2019 8:54 AM CST  Subject: Prescription Question    Dr May Glynn, I have a question about the Levemir FlexTouch you prescribed in October to replace the Lantus insulin I use to take that my insurance w

## 2019-03-20 NOTE — TELEPHONE ENCOUNTER
Refill passed per [facility], [facility] protocol.   Diabetes Medications  Protocol Criteria:  · Appointment scheduled in the past 6 months or the next 3 months  · A1C < 7.5 in the past 6 months  · Creatinine in the past 12 months  · Creatinine result < 1.5   Rece

## 2019-03-22 NOTE — TELEPHONE ENCOUNTER
Received call from [facility] pharmacy requesting Novolog refill, was informed prescription sent 3/19/19 was not received. Reviewed Novolog Flexpen prescription as noted in EMR with pharmacistKatie.

## 2019-03-24 ENCOUNTER — PATIENT MESSAGE (OUTPATIENT)
Dept: FAMILY MEDICINE CLINIC | Facility: CLINIC | Age: 64
End: 2019-03-24

## 2019-03-25 NOTE — TELEPHONE ENCOUNTER
From: Scottie Ramos  To:  Aubrey Crain MD  Sent: 3/24/2019 7:47 AM CDT  Subject: Prescription Question    Hi, I am not sure what is going on but I requested a 90 day refill on my novalog insulin on March 19th and when I went To [facility] pharmacy in [place]

## 2019-03-25 NOTE — TELEPHONE ENCOUNTER
Pharmacy   [facility] [address] [place] [facility] [address] [phone], [phone]   Medication Detail    Disp Refills Start End    Insulin Aspart Pen (NOVOLOG FLEXPEN) 100 UNIT/ML Subcutaneous Solution Pen-injector 20 pen 2 3/19/2019     Si

## 2019-03-26 ENCOUNTER — TELEPHONE (OUTPATIENT)
Dept: FAMILY MEDICINE CLINIC | Facility: CLINIC | Age: 64
End: 2019-03-26

## 2019-03-26 NOTE — TELEPHONE ENCOUNTER
ACCU-CHECK ELAINE PLUS TEST STRIPS  Use to test blood sugar 4 times daily  Qty: 200    Message: alternative requested, strating the new year. Medicare guidelines they will only pay for test strips if patient is testing TID for all insulin dependent patients. Please approve new rx, thanks.

## 2019-03-27 RX ORDER — BLOOD SUGAR DIAGNOSTIC
STRIP MISCELLANEOUS
Qty: 200 STRIP | Refills: 3 | Status: SHIPPED | OUTPATIENT
Start: 2019-03-27 | End: 2019-11-11

## 2019-03-31 RX ORDER — AMLODIPINE BESYLATE 5 MG/1
TABLET ORAL
Qty: 90 TABLET | Refills: 0 | Status: SHIPPED | OUTPATIENT
Start: 2019-03-31 | End: 2019-06-26

## 2019-03-31 RX ORDER — FENOFIBRATE 145 MG/1
TABLET, COATED ORAL
Qty: 90 TABLET | Refills: 0 | Status: SHIPPED | OUTPATIENT
Start: 2019-03-31 | End: 2019-06-26

## 2019-04-01 NOTE — TELEPHONE ENCOUNTER
Refill passed per [facility], [facility] protocol.     Cholesterol Medications  Protocol Criteria:  · Appointment scheduled in the past 12 months or in the next 3 months  · ALT & LDL on file in the past 12 months  · ALT result < 80  · LDL result <130   Recent Outp [facility], [address] [place] [address]    Office Visit          Lab Results   Component Value Date     (H) 01/05/2019    BUN 15 01/05/2019    CREATSERUM 0.86 01/05/2019    BUNCREA 17.4 01/05/2019    GFRNAA >60 01/05/2019    GFR

## 2019-04-08 RX ORDER — SIMVASTATIN 20 MG
TABLET ORAL
Qty: 90 TABLET | Refills: 0 | Status: SHIPPED | OUTPATIENT
Start: 2019-04-08 | End: 2019-07-01

## 2019-04-28 RX ORDER — LISINOPRIL 30 MG/1
TABLET ORAL
Qty: 90 TABLET | Refills: 1 | Status: SHIPPED | OUTPATIENT
Start: 2019-04-28 | End: 2019-10-20

## 2019-05-06 ENCOUNTER — OFFICE VISIT (OUTPATIENT)
Dept: FAMILY MEDICINE CLINIC | Facility: CLINIC | Age: 64
End: 2019-05-06
Payer: MEDICARE

## 2019-05-06 ENCOUNTER — LAB ENCOUNTER (OUTPATIENT)
Dept: LAB | Age: 64
End: 2019-05-06
Attending: FAMILY MEDICINE
Payer: MEDICARE

## 2019-05-06 ENCOUNTER — TELEPHONE (OUTPATIENT)
Dept: OTHER | Age: 64
End: 2019-05-06

## 2019-05-06 ENCOUNTER — HOSPITAL ENCOUNTER (OUTPATIENT)
Dept: GENERAL RADIOLOGY | Age: 64
Discharge: HOME | End: 2019-05-06
Attending: FAMILY MEDICINE | Admitting: FAMILY MEDICINE
Payer: MEDICARE

## 2019-05-06 VITALS
HEART RATE: 96 BPM | RESPIRATION RATE: 18 BRPM | WEIGHT: 210.38 LBS | SYSTOLIC BLOOD PRESSURE: 146 MMHG | BODY MASS INDEX: 41.3 KG/M2 | TEMPERATURE: 99 F | DIASTOLIC BLOOD PRESSURE: 82 MMHG | HEIGHT: 60 IN

## 2019-05-06 DIAGNOSIS — E11.9 TYPE 2 DIABETES MELLITUS WITHOUT COMPLICATION, WITHOUT LONG-TERM CURRENT USE OF INSULIN (HCC): ICD-10-CM

## 2019-05-06 DIAGNOSIS — R19.7 DIARRHEA, UNSPECIFIED TYPE: ICD-10-CM

## 2019-05-06 DIAGNOSIS — M25.551 RIGHT HIP PAIN: Primary | ICD-10-CM

## 2019-05-06 DIAGNOSIS — M25.551 RIGHT HIP PAIN: ICD-10-CM

## 2019-05-06 PROCEDURE — 82570 ASSAY OF URINE CREATININE: CPT

## 2019-05-06 PROCEDURE — 36415 COLL VENOUS BLD VENIPUNCTURE: CPT

## 2019-05-06 PROCEDURE — 82043 UR ALBUMIN QUANTITATIVE: CPT

## 2019-05-06 PROCEDURE — 73502 X-RAY EXAM HIP UNI 2-3 VIEWS: CPT | Performed by: FAMILY MEDICINE

## 2019-05-06 PROCEDURE — 99214 OFFICE O/P EST MOD 30 MIN: CPT | Performed by: FAMILY MEDICINE

## 2019-05-06 PROCEDURE — G0463 HOSPITAL OUTPT CLINIC VISIT: HCPCS | Performed by: FAMILY MEDICINE

## 2019-05-06 PROCEDURE — 83036 HEMOGLOBIN GLYCOSYLATED A1C: CPT

## 2019-05-06 RX ORDER — CLOTRIMAZOLE AND BETAMETHASONE DIPROPIONATE 10; .64 MG/G; MG/G
1 CREAM TOPICAL 2 TIMES DAILY PRN
Qty: 15 G | Refills: 3 | Status: SHIPPED | OUTPATIENT
Start: 2019-05-06 | End: 2019-06-13 | Stop reason: ALTCHOICE

## 2019-05-06 RX ORDER — GLIMEPIRIDE 1 MG/1
TABLET ORAL
Qty: 180 TABLET | Refills: 4 | Status: SHIPPED | OUTPATIENT
Start: 2019-05-06 | End: 2019-05-23

## 2019-05-06 NOTE — PROGRESS NOTES
Sarah Reid is a 75year old female. Patient presents with:  Stomach Pain: c/o stabbing pain on LLQ   Vomiting: started have vomiting and diarrhea today. HPI:   Friday night - 3 days ago started with chills.  Next day Saturday mowed the lawn and sta apply Misc Test fingerstick blood sugar four times daily. Disp: 100 each Rfl: 2   Ondansetron HCl (ZOFRAN) 4 mg tablet Take 1 tablet (4 mg total) by mouth every 12 (twelve) hours as needed for Nausea.  Disp: 30 tablet Rfl: 0   Fluticasone Propionate 50 MCG/ denies any unusual skin lesions or rashes  HEENT: denies eye complaints,denies sore throat, denies ear pain  RESPIRATORY: denies shortness of breath, denies cough  CARDIOVASCULAR: denies chest pain  GI: pos abdominal pain and pos heartburn  NEURO: denies h

## 2019-05-06 NOTE — TELEPHONE ENCOUNTER
Action Requested: Summary for Provider     []  Critical Lab, Recommendations Needed  [] Need Additional Advice  []   FYI    []   Need Orders  [] Need Medications Sent to Pharmacy  []  Other     SUMMARY:  OV scheduled today with Dr Virginia Reis for further ANTONIA JOHNSON

## 2019-05-07 NOTE — PROGRESS NOTES
Angy - Your diabetes is a bit worse. Hgb A1C 8.0 this time. Please let me know your readings in few weeks since stopping the metformin and increasing the glimepiride to twice a day.  That can be gradually increased to up to 4 mg twice a day. - Dr. Isaias Aquino

## 2019-05-07 NOTE — PROGRESS NOTES
Angy - X-ray shows mild arthritis in the right hip.  I can send you to see ortho for options for treatment if continues to bother you but can also take tylenol as needed. - Dr. Georges Erm

## 2019-06-08 ENCOUNTER — PATIENT MESSAGE (OUTPATIENT)
Dept: FAMILY MEDICINE CLINIC | Facility: CLINIC | Age: 64
End: 2019-06-08

## 2019-06-10 NOTE — TELEPHONE ENCOUNTER
From: [facility]  To: Hipolito Talamantes MD  Sent: 6/8/2019 8:52 PM CDT  Subject: Non-Urgent Medical Question    Dr Nan Choiy tried upping my Levemir dose to 70 units a night instead of 60 but there’s really been no change.  Blood sugar levels still h

## 2019-06-10 NOTE — PROGRESS NOTES
Patient confused about dosing and which DM meds are actually helping. Requested OV. Scheduled with Dr. Chikis Hernandez 6/13.

## 2019-06-13 ENCOUNTER — OFFICE VISIT (OUTPATIENT)
Dept: FAMILY MEDICINE CLINIC | Facility: CLINIC | Age: 64
End: 2019-06-13
Payer: MEDICARE

## 2019-06-13 VITALS
DIASTOLIC BLOOD PRESSURE: 71 MMHG | HEIGHT: 60 IN | WEIGHT: 209.81 LBS | BODY MASS INDEX: 41.19 KG/M2 | SYSTOLIC BLOOD PRESSURE: 121 MMHG | HEART RATE: 76 BPM

## 2019-06-13 DIAGNOSIS — E11.9 TYPE 2 DIABETES MELLITUS WITHOUT COMPLICATION, WITHOUT LONG-TERM CURRENT USE OF INSULIN (HCC): Primary | ICD-10-CM

## 2019-06-13 DIAGNOSIS — Z78.0 POST-MENOPAUSAL: ICD-10-CM

## 2019-06-13 PROCEDURE — G0009 ADMIN PNEUMOCOCCAL VACCINE: HCPCS | Performed by: FAMILY MEDICINE

## 2019-06-13 PROCEDURE — 90670 PCV13 VACCINE IM: CPT | Performed by: FAMILY MEDICINE

## 2019-06-13 PROCEDURE — G0463 HOSPITAL OUTPT CLINIC VISIT: HCPCS | Performed by: FAMILY MEDICINE

## 2019-06-13 PROCEDURE — 99214 OFFICE O/P EST MOD 30 MIN: CPT | Performed by: FAMILY MEDICINE

## 2019-06-13 RX ORDER — METFORMIN HYDROCHLORIDE 500 MG/1
500 TABLET, EXTENDED RELEASE ORAL
Qty: 90 TABLET | Refills: 2 | Status: SHIPPED | OUTPATIENT
Start: 2019-06-13 | End: 2019-07-27

## 2019-06-13 NOTE — PATIENT INSTRUCTIONS
1. Type 2 diabetes mellitus without complication, without long-term current use of insulin ([facility]  Will add back metformin. Start for week then increase glimepiride to 4 mg in morning for week then 3rd week increase to twice a day.    Labs in 2 months

## 2019-06-13 NOTE — PROGRESS NOTES
Fuentes Fortune is a 75year old female. Patient presents with:  Diabetes  Hyperlipidemia    HPI:   Using 70 units of levemir and 18 units before meals. Was having constipation now without the metformin.  Did have diarrhea with the metformin but thinks he TWO SPRAYS IN EACH NOSTRIL DAILY Disp: 1 Inhaler Rfl: 3   Lancets Does not apply Misc Test blood sugar 4 times daily.  Disp: 100 each Rfl: 1   ALPRAZolam 0.25 MG Oral Tab 1 TABLET 2 TIMES DAILY AS NEEDED Disp: 30 tablet Rfl: 5   Insulin Pen Needle (Adelene [place] kg)   BMI 40.97 kg/m²   GENERAL: well developed, well nourished,in no apparent distress  SKIN: no rashes,no suspicious lesions  NECK: supple,no adenopathy,no bruits  LUNGS: clear to auscultation  CARDIO: RRR without murmur  EXTREMITIES: no cyanosis, clubbi

## 2019-06-24 NOTE — TELEPHONE ENCOUNTER
Requested Prescriptions     Pending Prescriptions Disp Refills   • insulin glargine (LANTUS) 100 UNIT/ML Subcutaneous Solution 90 mL 1     Sig: INJECT 60 UNITS SUBCUTANEOUSLY ONCE A DAY         Recent Visits  Date Type Provider Dept   06/13/19 Office Visit

## 2019-06-26 RX ORDER — AMLODIPINE BESYLATE 5 MG/1
TABLET ORAL
Qty: 90 TABLET | Refills: 1 | Status: SHIPPED | OUTPATIENT
Start: 2019-06-26 | End: 2019-12-16

## 2019-06-26 RX ORDER — FENOFIBRATE 145 MG/1
TABLET, COATED ORAL
Qty: 90 TABLET | Refills: 1 | Status: SHIPPED | OUTPATIENT
Start: 2019-06-26 | End: 2019-12-16

## 2019-06-27 NOTE — TELEPHONE ENCOUNTER
Refill passed per [facility], [facility] protocol.   Cholesterol Medications  Protocol Criteria:  · Appointment scheduled in the past 12 months or in the next 3 months  · ALT & LDL on file in the past 12 months  · ALT result < 80  · LDL result <130   Recent Outpat [facility] Desiree Ahuja MD    Office Visit          Lab Results   Component Value Date     (H) 01/05/2019    BUN 15 01/05/2019    CREATSERUM 0.86 01/05/2019    BUNCREA 17.4 01/05/2019    GFRNAA >60 01/05/2019    G

## 2019-07-01 RX ORDER — SIMVASTATIN 20 MG
TABLET ORAL
Qty: 90 TABLET | Refills: 1 | Status: SHIPPED | OUTPATIENT
Start: 2019-07-01 | End: 2020-02-22

## 2019-07-05 RX ORDER — PANTOPRAZOLE SODIUM 40 MG/1
TABLET, DELAYED RELEASE ORAL
Qty: 90 TABLET | Refills: 1 | Status: SHIPPED | OUTPATIENT
Start: 2019-07-05 | End: 2019-12-18

## 2019-07-27 ENCOUNTER — PATIENT MESSAGE (OUTPATIENT)
Dept: FAMILY MEDICINE CLINIC | Facility: CLINIC | Age: 64
End: 2019-07-27

## 2019-07-27 RX ORDER — FLUTICASONE PROPIONATE 50 MCG
SPRAY, SUSPENSION (ML) NASAL
Qty: 3 INHALER | Refills: 1 | Status: SHIPPED | OUTPATIENT
Start: 2019-07-27 | End: 2020-01-16

## 2019-07-27 NOTE — TELEPHONE ENCOUNTER
Refill passed per [facility], [facility] protocol.     Refill Protocol Appointment Criteria  · Appointment scheduled in the past 6 months or in the next 3 months  Recent Outpatient Visits            1 month ago Type 2 diabetes mellitus without complication, withou

## 2019-07-27 NOTE — TELEPHONE ENCOUNTER
----- Message from Milford Bloch sent at 7/27/2019  7:13 AM CDT -----  Regarding: Prescription Question  Contact: [phone]  Dr Choco Cherry, I need a refill of MetFormin  MG tablet that reads twice a day morning and evening a 90 day supply .  I use t

## 2019-07-27 NOTE — TELEPHONE ENCOUNTER
Spoke with patient (verified name and ), states that she is taking METFORMIN 24 hr 1 tablet two times a day, advised that this medication is 24 hr release, states that her BS is not control with once a day , so she increased it to twice a day and so far

## 2019-07-27 NOTE — TELEPHONE ENCOUNTER
See Refill Te 7/27/19. From: Linnea Justice  To: Zen De La Garza MD  Sent: 7/27/2019  7:13 AM CDT  Subject: Prescription Question    Dr Mirza Santana, I need a refill of MetFormin  MG tablet that reads twice a day morning and evening a 90 day supply .

## 2019-07-29 RX ORDER — METFORMIN HYDROCHLORIDE 500 MG/1
500 TABLET, EXTENDED RELEASE ORAL
Qty: 90 TABLET | Refills: 1 | Status: SHIPPED | OUTPATIENT
Start: 2019-07-29 | End: 2019-08-19

## 2019-08-17 ENCOUNTER — TELEPHONE (OUTPATIENT)
Dept: OTHER | Age: 64
End: 2019-08-17

## 2019-08-17 NOTE — TELEPHONE ENCOUNTER
Pt is requesting refill on Glimepiride, Metformin and Levemir. Please see OV note. Please advise. rx pending for review. ASSESSMENT AND PLAN:   1.  Type 2 diabetes mellitus without complication, without long-term current use of insulin ([facility] [address]  Will add

## 2019-08-18 RX ORDER — GLIMEPIRIDE 4 MG/1
4 TABLET ORAL 2 TIMES DAILY
Qty: 180 TABLET | Refills: 0 | Status: SHIPPED | OUTPATIENT
Start: 2019-08-18 | End: 2019-11-11

## 2019-08-19 RX ORDER — INSULIN DETEMIR 100 [IU]/ML
INJECTION, SOLUTION SUBCUTANEOUS
Refills: 3 | OUTPATIENT
Start: 2019-08-19

## 2019-08-26 DIAGNOSIS — S46.811A STRAIN OF RIGHT TRAPEZIUS MUSCLE, INITIAL ENCOUNTER: ICD-10-CM

## 2019-08-26 RX ORDER — MELOXICAM 15 MG/1
15 TABLET ORAL DAILY
Qty: 30 TABLET | Refills: 1 | Status: SHIPPED | OUTPATIENT
Start: 2019-08-26 | End: 2020-07-09

## 2019-08-26 NOTE — TELEPHONE ENCOUNTER
OK for refill. But I have not seen her in over a year, if pain and the need for meloxicam persists, she should come in for a follow up.

## 2019-08-26 NOTE — TELEPHONE ENCOUNTER
Called patient to relay information and informed her of Dr. Ely Oswald [facility] schedule - she voiced understanding.

## 2019-09-06 ENCOUNTER — PATIENT MESSAGE (OUTPATIENT)
Dept: FAMILY MEDICINE CLINIC | Facility: CLINIC | Age: 64
End: 2019-09-06

## 2019-09-06 NOTE — TELEPHONE ENCOUNTER
Patient states not ready to try new diabetes medication as suggested by Dr. Luis A Olson, as they too are in a third tier and would be costly. Patient would like to know if increasing her Novalog to 20 units at mealtime would help  Some. Please advise.

## 2019-09-06 NOTE — TELEPHONE ENCOUNTER
From: Keysha Boyce  To: Raul Velarde MD  Sent: 9/6/2019 9:26 AM CDT  Subject: Non-Urgent Medical Question    QDr. Brendan Gandhi, would it be possible to increase my Novolog meal insulin to 20 units at mealtime. I think this would be beneficial to me.  I sti

## 2019-09-10 ENCOUNTER — OFFICE VISIT (OUTPATIENT)
Dept: FAMILY MEDICINE CLINIC | Facility: CLINIC | Age: 64
End: 2019-09-10
Payer: MEDICARE

## 2019-09-10 VITALS
SYSTOLIC BLOOD PRESSURE: 112 MMHG | DIASTOLIC BLOOD PRESSURE: 68 MMHG | BODY MASS INDEX: 41.43 KG/M2 | WEIGHT: 211 LBS | HEART RATE: 73 BPM | HEIGHT: 60 IN

## 2019-09-10 DIAGNOSIS — M25.512 ACUTE PAIN OF LEFT SHOULDER: ICD-10-CM

## 2019-09-10 DIAGNOSIS — M54.2 NECK PAIN: Primary | ICD-10-CM

## 2019-09-10 PROCEDURE — 99213 OFFICE O/P EST LOW 20 MIN: CPT | Performed by: NURSE PRACTITIONER

## 2019-09-10 PROCEDURE — G0463 HOSPITAL OUTPT CLINIC VISIT: HCPCS | Performed by: NURSE PRACTITIONER

## 2019-09-10 RX ORDER — CYCLOBENZAPRINE HCL 5 MG
5 TABLET ORAL 3 TIMES DAILY PRN
Qty: 30 TABLET | Refills: 0 | Status: SHIPPED | OUTPATIENT
Start: 2019-09-10 | End: 2020-10-27

## 2019-09-10 NOTE — PROGRESS NOTES
HPI  Pt here for left neck pain that radiates to left shoulder and upper arm. Hit shoulder on [place] 2 weeks ago. Slept with heating pad on last night. Has long term neck issues for the past 38 years after a fall down the stairs.   Has some right • Egd  06/26/2018   • Elbow fracture surgery Right 1998   • Glaucoma surgery Bilateral 2012   • Knee scope,med/lat menisectomy Left 2004   • Laparoscopic cholecystectomy  1993   • Removal of ovary/tube(s) Bilateral 11/04/2017    Uma, Dee/Trav Fear of current or ex partner: Not on file        Emotionally abused: Not on file        Physically abused: Not on file        Forced sexual activity: Not on file    Other Topics      Concerns:         Service: Not Asked        Blood Transfus LISINOPRIL 30 MG Oral Tab TAKE 1 TABLET BY MOUTH EVERY DAY Disp: 90 tablet Rfl: 1   Glucose Blood (ACCU-CHEK ELAINE PLUS) In Vitro Strip Check glucose 3 times a day Disp: 200 strip Rfl: 3   Insulin Aspart Pen (NOVOLOG FLEXPEN) 100 UNIT/ML Subcutaneous Solut Psychiatric: She has a normal mood and affect.  Her behavior is normal.       Assessment and Plan:  Problem List Items Addressed This Visit        Musculoskeletal    Acute pain of left shoulder     con't meloxicam prn  Ice to neck and shoulder 20 min 4-6 ti

## 2019-09-10 NOTE — PATIENT INSTRUCTIONS
ICE PACK    In large ziplock bag, combine:    4 cups tap water  1 cup isopropyl (rubbing) alcohol    Seal bag and place in another ziplock bag. Freeze until slushy.   Do not apply to bare skin-use a cold, wet wash cloth to injured area and then place

## 2019-09-10 NOTE — ASSESSMENT & PLAN NOTE
con't meloxicam prn  Ice to neck and shoulder 20 min 4-6 times per day  Do not apply heat   Neck exercises given to pt to be done daily  Flexeril 5 mg I po tid prn-will cause drowsiness  Declines PT as she does not drive.      Please call if symptoms worsen

## 2019-09-30 PROBLEM — E11.9 TYPE 2 DIABETES MELLITUS WITHOUT COMPLICATION, WITH LONG-TERM CURRENT USE OF INSULIN (HCC): Status: ACTIVE | Noted: 2019-09-30

## 2019-09-30 PROBLEM — E11.3291 MILD NONPROLIFERATIVE DIABETIC RETINOPATHY OF RIGHT EYE WITHOUT MACULAR EDEMA ASSOCIATED WITH TYPE 2 DIABETES MELLITUS (HCC): Status: ACTIVE | Noted: 2019-09-30

## 2019-09-30 PROBLEM — Z79.4 TYPE 2 DIABETES MELLITUS WITHOUT COMPLICATION, WITH LONG-TERM CURRENT USE OF INSULIN (HCC): Status: ACTIVE | Noted: 2019-09-30

## 2019-10-09 RX ORDER — ALPRAZOLAM 0.25 MG/1
TABLET ORAL
Qty: 30 TABLET | Refills: 5 | Status: SHIPPED | OUTPATIENT
Start: 2019-10-09 | End: 2020-02-22

## 2019-10-09 NOTE — TELEPHONE ENCOUNTER
Controlled medication pending for review. Please change to phone in, fax, or print script if not being sent electronically.     Last Rx: 10/21/17  LOV: 6/13/19    Requested Prescriptions   Pending Prescriptions Disp Refills   • ALPRAZolam 0.25 MG Oral Tab

## 2019-10-20 DIAGNOSIS — S46.811A STRAIN OF RIGHT TRAPEZIUS MUSCLE, INITIAL ENCOUNTER: ICD-10-CM

## 2019-10-21 RX ORDER — LISINOPRIL 30 MG/1
TABLET ORAL
Qty: 90 TABLET | Refills: 1 | Status: SHIPPED | OUTPATIENT
Start: 2019-10-21 | End: 2020-04-13

## 2019-10-21 RX ORDER — MELOXICAM 15 MG/1
TABLET ORAL
Qty: 30 TABLET | Refills: 1 | OUTPATIENT
Start: 2019-10-21

## 2019-10-22 NOTE — TELEPHONE ENCOUNTER
Refill passed per [facility], [facility] protocol.   Hypertensive Medications  Protocol Criteria:  · Appointment scheduled in the past 6 months or in the next 3 months  · BMP or CMP in the past 12 months  · Creatinine result < 2  Recent Outpatient Visits

## 2019-10-25 ENCOUNTER — IMMUNIZATION (OUTPATIENT)
Dept: FAMILY MEDICINE CLINIC | Facility: CLINIC | Age: 64
End: 2019-10-25
Payer: MEDICARE

## 2019-10-25 DIAGNOSIS — Z23 NEED FOR VACCINATION: ICD-10-CM

## 2019-10-25 PROCEDURE — 90662 IIV NO PRSV INCREASED AG IM: CPT | Performed by: FAMILY MEDICINE

## 2019-10-25 PROCEDURE — G0008 ADMIN INFLUENZA VIRUS VAC: HCPCS | Performed by: FAMILY MEDICINE

## 2019-11-12 RX ORDER — GLIMEPIRIDE 4 MG/1
TABLET ORAL
Qty: 180 TABLET | Refills: 1 | Status: SHIPPED | OUTPATIENT
Start: 2019-11-12 | End: 2020-05-07

## 2019-11-12 RX ORDER — BLOOD SUGAR DIAGNOSTIC
STRIP MISCELLANEOUS
Qty: 200 STRIP | Refills: 3 | Status: SHIPPED | OUTPATIENT
Start: 2019-11-12 | End: 2019-11-15

## 2019-11-12 NOTE — TELEPHONE ENCOUNTER
Requested Prescriptions     Pending Prescriptions Disp Refills   • GLIMEPIRIDE 4 MG Oral Tab [Pharmacy Med Name: GLIMEPIRIDE 4 MG TABLET] 180 tablet 1     Sig: TAKE 1 TABLET BY MOUTH 2 TIMES DAILY.    • ACCU-CHEK ELAINE PLUS In [facility] Med Name

## 2019-11-14 RX ORDER — BLOOD SUGAR DIAGNOSTIC
STRIP MISCELLANEOUS
Qty: 200 STRIP | Refills: 3 | OUTPATIENT
Start: 2019-11-14

## 2019-11-15 ENCOUNTER — TELEPHONE (OUTPATIENT)
Dept: FAMILY MEDICINE CLINIC | Facility: CLINIC | Age: 64
End: 2019-11-15

## 2019-11-15 RX ORDER — BLOOD SUGAR DIAGNOSTIC
STRIP MISCELLANEOUS
Qty: 200 STRIP | Refills: 3 | Status: SHIPPED | OUTPATIENT
Start: 2019-11-15 | End: 2020-01-16

## 2019-11-15 NOTE — TELEPHONE ENCOUNTER
Pharmacy called in requesting script to state testing once daily, due to Medicare only covering once per non-insulin dependent. Please advise.      Current Outpatient Medications:   •  ACCU-CHEK ELAINE PLUS In Vitro Strip, CHECK GLUCOSE 3 TIMES A DAY, Di

## 2019-12-11 RX ORDER — SIMVASTATIN 20 MG
TABLET ORAL
Qty: 90 TABLET | Refills: 1 | OUTPATIENT
Start: 2019-12-11

## 2019-12-11 NOTE — TELEPHONE ENCOUNTER
Too early.     Medication Detail     Medication Quantity Refills Start End   SIMVASTATIN 20 MG Oral Tab 90 tablet 1 7/1/2019    Sig: Roxana Nash 1 TABLET BY MOUTH EVERY DAY IN THE EVENING     Route:   (none)     Order #:   [id number]

## 2019-12-16 RX ORDER — AMLODIPINE BESYLATE 5 MG/1
TABLET ORAL
Qty: 90 TABLET | Refills: 1 | Status: SHIPPED | OUTPATIENT
Start: 2019-12-16 | End: 2020-06-06

## 2019-12-16 RX ORDER — FENOFIBRATE 145 MG/1
TABLET, COATED ORAL
Qty: 90 TABLET | Refills: 1 | Status: SHIPPED | OUTPATIENT
Start: 2019-12-16 | End: 2020-06-06

## 2019-12-16 NOTE — TELEPHONE ENCOUNTER
Refill passed per [facility], [facility] protocol.   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

## 2019-12-18 RX ORDER — PANTOPRAZOLE SODIUM 40 MG/1
TABLET, DELAYED RELEASE ORAL
Qty: 90 TABLET | Refills: 1 | Status: SHIPPED | OUTPATIENT
Start: 2019-12-18 | End: 2020-06-06

## 2020-01-02 ENCOUNTER — PATIENT MESSAGE (OUTPATIENT)
Dept: FAMILY MEDICINE CLINIC | Facility: CLINIC | Age: 65
End: 2020-01-02

## 2020-01-03 NOTE — TELEPHONE ENCOUNTER
From: Gayatri Alexandra  To: Hector Barnes MD  Sent: 1/2/2020 12:11 PM CST  Subject: Other    Dr. Hasmukh Santiago ,  My  Berta Vasquez has Afib . I was told to contact you for a recommendation for a cardiologist two were mentioned Johnna Rodriguez MD Cardiac electrophysi

## 2020-01-16 ENCOUNTER — TELEPHONE (OUTPATIENT)
Dept: FAMILY MEDICINE CLINIC | Facility: CLINIC | Age: 65
End: 2020-01-16

## 2020-01-16 RX ORDER — FLUTICASONE PROPIONATE 50 MCG
SPRAY, SUSPENSION (ML) NASAL
Qty: 3 BOTTLE | Refills: 1 | Status: SHIPPED | OUTPATIENT
Start: 2020-01-16 | End: 2020-07-07

## 2020-01-17 RX ORDER — BLOOD SUGAR DIAGNOSTIC
STRIP MISCELLANEOUS
Qty: 400 STRIP | Refills: 3 | Status: SHIPPED | OUTPATIENT
Start: 2020-01-17 | End: 2020-01-21

## 2020-01-17 RX ORDER — LANCETS 30 GAUGE
EACH MISCELLANEOUS
Qty: 400 EACH | Refills: 3 | Status: SHIPPED | OUTPATIENT
Start: 2020-01-17 | End: 2020-01-21

## 2020-01-17 NOTE — TELEPHONE ENCOUNTER
Refill passed per [facility], [facility] protocol.   Diabetic Supplies  Protocol Criteria:  · Appointment scheduled in past 12 months or the next 3 months

## 2020-01-20 NOTE — TELEPHONE ENCOUNTER
Katie calling from [facility] pharmacy and states the insurance will only pay for three time a day for the patient       Test scripts   [facility] scripts     Please advise  [phone]

## 2020-01-21 ENCOUNTER — TELEPHONE (OUTPATIENT)
Dept: FAMILY MEDICINE CLINIC | Facility: CLINIC | Age: 65
End: 2020-01-21

## 2020-01-21 RX ORDER — LANCETS 30 GAUGE
EACH MISCELLANEOUS
Qty: 300 EACH | Refills: 3 | Status: SHIPPED | OUTPATIENT
Start: 2020-01-21

## 2020-01-21 RX ORDER — BLOOD SUGAR DIAGNOSTIC
STRIP MISCELLANEOUS
Qty: 300 STRIP | Refills: 3 | Status: SHIPPED | OUTPATIENT
Start: 2020-01-21 | End: 2020-01-23

## 2020-01-21 NOTE — TELEPHONE ENCOUNTER
Prescriptions corrected and [facility]     Glucose Blood (ACCU-CHEK ELAINE PLUS) In Vitro Strip 300 strip 3 1/21/2020     Sig: Check blood sugar 3 times a day    Sent to pharmacy as: Accu-Chek Elaine Plus In Vitro Strip    Notes to Pharmacy: DX Code E

## 2020-02-04 NOTE — TELEPHONE ENCOUNTER
Requested Prescriptions     Pending Prescriptions Disp Refills   • insulin detemir 100 UNIT/ML Subcutaneous Solution Pen-injector [Pharmacy Med Name: Enoch Allan 100 UNIT/ML] 30 pen 3     Sig: INJECT 70 UNITS INTO THE SKIN NIGHTLY.      Signed Prescri

## 2020-02-13 ENCOUNTER — LAB ENCOUNTER (OUTPATIENT)
Dept: LAB | Age: 65
End: 2020-02-13
Attending: FAMILY MEDICINE
Payer: MEDICARE

## 2020-02-13 DIAGNOSIS — E11.9 TYPE 2 DIABETES MELLITUS WITHOUT COMPLICATION, WITHOUT LONG-TERM CURRENT USE OF INSULIN (HCC): ICD-10-CM

## 2020-02-13 LAB
ALBUMIN SERPL-MCNC: 3.9 G/DL (ref 3.4–5)
ALBUMIN/GLOB SERPL: 1.1 {RATIO} (ref 1–2)
ALP LIVER SERPL-CCNC: 40 U/L (ref 55–142)
ALT SERPL-CCNC: 31 U/L (ref 13–56)
ANION GAP SERPL CALC-SCNC: 4 MMOL/L (ref 0–18)
AST SERPL-CCNC: 32 U/L (ref 15–37)
BASOPHILS # BLD AUTO: 0.05 X10(3) UL (ref 0–0.2)
BASOPHILS NFR BLD AUTO: 0.5 %
BILIRUB SERPL-MCNC: 0.4 MG/DL (ref 0.1–2)
BUN BLD-MCNC: 23 MG/DL (ref 7–18)
BUN/CREAT SERPL: 25.8 (ref 10–20)
CALCIUM BLD-MCNC: 10 MG/DL (ref 8.5–10.1)
CHLORIDE SERPL-SCNC: 106 MMOL/L (ref 98–112)
CHOLEST SMN-MCNC: 167 MG/DL (ref ?–200)
CO2 SERPL-SCNC: 32 MMOL/L (ref 21–32)
CREAT BLD-MCNC: 0.89 MG/DL (ref 0.55–1.02)
DEPRECATED RDW RBC AUTO: 41.5 FL (ref 35.1–46.3)
EOSINOPHIL # BLD AUTO: 0.16 X10(3) UL (ref 0–0.7)
EOSINOPHIL NFR BLD AUTO: 1.5 %
ERYTHROCYTE [DISTWIDTH] IN BLOOD BY AUTOMATED COUNT: 12.8 % (ref 11–15)
EST. AVERAGE GLUCOSE BLD GHB EST-MCNC: 157 MG/DL (ref 68–126)
GLOBULIN PLAS-MCNC: 3.4 G/DL (ref 2.8–4.4)
GLUCOSE BLD-MCNC: 169 MG/DL (ref 70–99)
HBA1C MFR BLD HPLC: 7.1 % (ref ?–5.7)
HCT VFR BLD AUTO: 40.6 % (ref 35–48)
HDLC SERPL-MCNC: 48 MG/DL (ref 40–59)
HGB BLD-MCNC: 13.1 G/DL (ref 12–16)
IMM GRANULOCYTES # BLD AUTO: 0.06 X10(3) UL (ref 0–1)
IMM GRANULOCYTES NFR BLD: 0.6 %
LDLC SERPL CALC-MCNC: 87 MG/DL (ref ?–100)
LYMPHOCYTES # BLD AUTO: 2.62 X10(3) UL (ref 1–4)
LYMPHOCYTES NFR BLD AUTO: 24.7 %
M PROTEIN MFR SERPL ELPH: 7.3 G/DL (ref 6.4–8.2)
MCH RBC QN AUTO: 28.7 PG (ref 26–34)
MCHC RBC AUTO-ENTMCNC: 32.3 G/DL (ref 31–37)
MCV RBC AUTO: 88.8 FL (ref 80–100)
MONOCYTES # BLD AUTO: 0.52 X10(3) UL (ref 0.1–1)
MONOCYTES NFR BLD AUTO: 4.9 %
NEUTROPHILS # BLD AUTO: 7.2 X10 (3) UL (ref 1.5–7.7)
NEUTROPHILS # BLD AUTO: 7.2 X10(3) UL (ref 1.5–7.7)
NEUTROPHILS NFR BLD AUTO: 67.8 %
NONHDLC SERPL-MCNC: 119 MG/DL (ref ?–130)
OSMOLALITY SERPL CALC.SUM OF ELEC: 302 MOSM/KG (ref 275–295)
PATIENT FASTING Y/N/NP: YES
PATIENT FASTING Y/N/NP: YES
PLATELET # BLD AUTO: 338 10(3)UL (ref 150–450)
POTASSIUM SERPL-SCNC: 3.8 MMOL/L (ref 3.5–5.1)
RBC # BLD AUTO: 4.57 X10(6)UL (ref 3.8–5.3)
SODIUM SERPL-SCNC: 142 MMOL/L (ref 136–145)
TRIGL SERPL-MCNC: 158 MG/DL (ref 30–149)
TSI SER-ACNC: 2.29 MIU/ML (ref 0.36–3.74)
VLDLC SERPL CALC-MCNC: 32 MG/DL (ref 0–30)
WBC # BLD AUTO: 10.6 X10(3) UL (ref 4–11)

## 2020-02-13 PROCEDURE — 36415 COLL VENOUS BLD VENIPUNCTURE: CPT

## 2020-02-13 PROCEDURE — 84443 ASSAY THYROID STIM HORMONE: CPT

## 2020-02-13 PROCEDURE — 83036 HEMOGLOBIN GLYCOSYLATED A1C: CPT

## 2020-02-13 PROCEDURE — 85025 COMPLETE CBC W/AUTO DIFF WBC: CPT

## 2020-02-13 PROCEDURE — 80061 LIPID PANEL: CPT

## 2020-02-13 PROCEDURE — 80053 COMPREHEN METABOLIC PANEL: CPT

## 2020-02-13 NOTE — PROGRESS NOTES
Angy - Diabetes is better than it has been in past. Continue current regimen.  Cholesterol is stable. - Dr. Adalgisa Page

## 2020-02-22 RX ORDER — SIMVASTATIN 20 MG
20 TABLET ORAL NIGHTLY
Qty: 90 TABLET | Refills: 1 | Status: SHIPPED | OUTPATIENT
Start: 2020-02-22 | End: 2020-08-15

## 2020-02-23 NOTE — TELEPHONE ENCOUNTER
Controlled medication pending for review. Please change to phone in, fax, or print script if not being sent electronically.     Last Rx: 10/9/19  LOV: 9/10/19     • ALPRAZolam 0.25 MG Oral Tab 30 tablet 5     Si TABLET 2 TIMES DAILY AS NEEDED       The

## 2020-02-24 RX ORDER — ALPRAZOLAM 0.25 MG/1
TABLET ORAL
Qty: 30 TABLET | Refills: 5 | Status: SHIPPED | OUTPATIENT
Start: 2020-02-24 | End: 2020-11-01

## 2020-04-03 DIAGNOSIS — S46.811A STRAIN OF RIGHT TRAPEZIUS MUSCLE, INITIAL ENCOUNTER: ICD-10-CM

## 2020-04-03 RX ORDER — MELOXICAM 15 MG/1
15 TABLET ORAL DAILY
Qty: 30 TABLET | Refills: 1 | OUTPATIENT
Start: 2020-04-03

## 2020-04-13 RX ORDER — LISINOPRIL 30 MG/1
TABLET ORAL
Qty: 90 TABLET | Refills: 1 | Status: SHIPPED | OUTPATIENT
Start: 2020-04-13 | End: 2020-09-30

## 2020-04-17 ENCOUNTER — PATIENT MESSAGE (OUTPATIENT)
Dept: FAMILY MEDICINE CLINIC | Facility: CLINIC | Age: 65
End: 2020-04-17

## 2020-04-18 ENCOUNTER — TELEPHONE (OUTPATIENT)
Dept: FAMILY MEDICINE CLINIC | Facility: CLINIC | Age: 65
End: 2020-04-18

## 2020-04-18 ENCOUNTER — NURSE TRIAGE (OUTPATIENT)
Dept: FAMILY MEDICINE CLINIC | Facility: CLINIC | Age: 65
End: 2020-04-18

## 2020-04-18 DIAGNOSIS — R21 SKIN RASH: Primary | ICD-10-CM

## 2020-04-18 PROCEDURE — 99441 PHONE E/M BY PHYS 5-10 MIN: CPT | Performed by: FAMILY MEDICINE

## 2020-04-18 RX ORDER — CLOTRIMAZOLE AND BETAMETHASONE DIPROPIONATE 10; .64 MG/G; MG/G
1 CREAM TOPICAL 2 TIMES DAILY
Qty: 60 G | Refills: 3 | Status: SHIPPED | OUTPATIENT
Start: 2020-04-18

## 2020-04-18 NOTE — TELEPHONE ENCOUNTER
Action Requested: Summary for Provider     []  Critical Lab, Recommendations Needed  [x] Need Additional Advice  []   FYI    []   Need Orders  [] Need Medications Sent to Pharmacy  []  Other     SUMMARY: Patient reports her 5th digit on the right foot has

## 2020-04-18 NOTE — TELEPHONE ENCOUNTER
----- Message from Tanika Brewer sent at 4/18/2020 10:25 AM CDT -----  Regarding: RE: Non-Urgent Medical Question  Contact: [phone]  Thank you!  ----- Message -----  From: Berny Hernandes  Sent: 4/18/2020 10:23 AM CDT  To: Tanika Brewer  Subject: R

## 2020-04-18 NOTE — TELEPHONE ENCOUNTER
Please see message below and advise, thank you.       Non-Urgent Medical Question  4/18/2020 9:13 AM     To: Gayatri Alexandra      From: Thanh Snow RN      Created: 4/18/2020 9:13 AM        Isaac Lin,    I will forward your message to Dr. Hasmukh Santiago, fo

## 2020-04-18 NOTE — TELEPHONE ENCOUNTER
From: Nellie Pedraza  To: Aixa Ellis MD  Sent: 4/17/2020 7:16 PM CDT  Subject: Non-Urgent Medical Question    Dr. Reynold Marquez had a problem with athletes foot lately I am sending you a picture of what my little toe looks like on my right foot.  It is

## 2020-04-26 DIAGNOSIS — S46.811A STRAIN OF RIGHT TRAPEZIUS MUSCLE, INITIAL ENCOUNTER: ICD-10-CM

## 2020-04-29 RX ORDER — MELOXICAM 15 MG/1
TABLET ORAL
Qty: 30 TABLET | Refills: 1 | OUTPATIENT
Start: 2020-04-29

## 2020-05-01 ENCOUNTER — TELEPHONE (OUTPATIENT)
Dept: FAMILY MEDICINE CLINIC | Facility: CLINIC | Age: 65
End: 2020-05-01

## 2020-05-01 RX ORDER — MELOXICAM 15 MG/1
15 TABLET ORAL DAILY
Qty: 90 TABLET | Refills: 1 | Status: SHIPPED | OUTPATIENT
Start: 2020-05-01 | End: 2020-10-27

## 2020-05-01 NOTE — TELEPHONE ENCOUNTER
Katie with [facility] called and advised they need a refill on the medication listed below for the patient. She advised she believes the patient is out of the medication. Please Advise.        Medication Needed:   Medication Quantity Refills St

## 2020-05-07 ENCOUNTER — TELEPHONE (OUTPATIENT)
Dept: FAMILY MEDICINE CLINIC | Facility: CLINIC | Age: 65
End: 2020-05-07

## 2020-05-07 RX ORDER — GLIMEPIRIDE 4 MG/1
4 TABLET ORAL 2 TIMES DAILY
Qty: 180 TABLET | Refills: 4 | Status: SHIPPED | OUTPATIENT
Start: 2020-05-07 | End: 2021-05-18

## 2020-05-07 NOTE — TELEPHONE ENCOUNTER
Current Outpatient Medications:   •  GLIMEPIRIDE 4 MG Oral Tab, TAKE 1 TABLET BY MOUTH 2 TIMES DAILY. , Disp: 180 tablet, Rfl: 1  •

## 2020-05-24 RX ORDER — METFORMIN HYDROCHLORIDE 500 MG/1
TABLET, EXTENDED RELEASE ORAL
Qty: 180 TABLET | Refills: 2 | Status: SHIPPED | OUTPATIENT
Start: 2020-05-24 | End: 2021-02-15

## 2020-06-06 RX ORDER — PANTOPRAZOLE SODIUM 40 MG/1
TABLET, DELAYED RELEASE ORAL
Qty: 90 TABLET | Refills: 1 | Status: SHIPPED | OUTPATIENT
Start: 2020-06-06 | End: 2020-12-22

## 2020-06-06 RX ORDER — AMLODIPINE BESYLATE 5 MG/1
TABLET ORAL
Qty: 90 TABLET | Refills: 1 | Status: SHIPPED | OUTPATIENT
Start: 2020-06-06 | End: 2020-12-22

## 2020-06-06 RX ORDER — FENOFIBRATE 145 MG/1
TABLET, COATED ORAL
Qty: 90 TABLET | Refills: 1 | Status: SHIPPED | OUTPATIENT
Start: 2020-06-06 | End: 2020-08-26

## 2020-06-22 ENCOUNTER — PATIENT MESSAGE (OUTPATIENT)
Dept: FAMILY MEDICINE CLINIC | Facility: CLINIC | Age: 65
End: 2020-06-22

## 2020-06-22 NOTE — TELEPHONE ENCOUNTER
From: Gayatri Alexandra  To: Hector Barnes MD  Sent: 6/22/2020 6:29 AM CDT  Subject: Prescription Question    Dr. Hasmukh Santiago, could you please send a refill of novalog 100 unit ML flex pen [facility] [place] [facility] for 18 units for some odd re

## 2020-07-07 RX ORDER — FLUTICASONE PROPIONATE 50 MCG
SPRAY, SUSPENSION (ML) NASAL
Qty: 3 BOTTLE | Refills: 1 | Status: SHIPPED | OUTPATIENT
Start: 2020-07-07 | End: 2021-01-05

## 2020-07-09 ENCOUNTER — LAB ENCOUNTER (OUTPATIENT)
Dept: LAB | Age: 65
End: 2020-07-09
Attending: FAMILY MEDICINE
Payer: MEDICARE

## 2020-07-09 ENCOUNTER — OFFICE VISIT (OUTPATIENT)
Dept: FAMILY MEDICINE CLINIC | Facility: CLINIC | Age: 65
End: 2020-07-09
Payer: MEDICARE

## 2020-07-09 VITALS
HEART RATE: 91 BPM | WEIGHT: 210.81 LBS | SYSTOLIC BLOOD PRESSURE: 131 MMHG | HEIGHT: 60 IN | TEMPERATURE: 99 F | BODY MASS INDEX: 41.39 KG/M2 | DIASTOLIC BLOOD PRESSURE: 73 MMHG

## 2020-07-09 DIAGNOSIS — M67.972 DISORDER OF LEFT ACHILLES TENDON: ICD-10-CM

## 2020-07-09 DIAGNOSIS — E11.3291 MILD NONPROLIFERATIVE DIABETIC RETINOPATHY OF RIGHT EYE WITHOUT MACULAR EDEMA ASSOCIATED WITH TYPE 2 DIABETES MELLITUS (HCC): ICD-10-CM

## 2020-07-09 DIAGNOSIS — E11.9 TYPE 2 DIABETES MELLITUS WITHOUT COMPLICATION, WITHOUT LONG-TERM CURRENT USE OF INSULIN (HCC): Primary | ICD-10-CM

## 2020-07-09 DIAGNOSIS — Z78.0 POST-MENOPAUSAL: ICD-10-CM

## 2020-07-09 DIAGNOSIS — E11.9 TYPE 2 DIABETES MELLITUS WITHOUT COMPLICATION, WITHOUT LONG-TERM CURRENT USE OF INSULIN (HCC): ICD-10-CM

## 2020-07-09 DIAGNOSIS — Z12.31 VISIT FOR SCREENING MAMMOGRAM: ICD-10-CM

## 2020-07-09 DIAGNOSIS — M75.82 ROTATOR CUFF TENDONITIS, LEFT: ICD-10-CM

## 2020-07-09 LAB
ALBUMIN SERPL-MCNC: 3.8 G/DL (ref 3.4–5)
ALBUMIN/GLOB SERPL: 1.2 {RATIO} (ref 1–2)
ALP LIVER SERPL-CCNC: 45 U/L (ref 55–142)
ALT SERPL-CCNC: 33 U/L (ref 13–56)
ANION GAP SERPL CALC-SCNC: 6 MMOL/L (ref 0–18)
AST SERPL-CCNC: 21 U/L (ref 15–37)
BASOPHILS # BLD AUTO: 0.06 X10(3) UL (ref 0–0.2)
BASOPHILS NFR BLD AUTO: 0.6 %
BILIRUB SERPL-MCNC: 0.4 MG/DL (ref 0.1–2)
BUN BLD-MCNC: 23 MG/DL (ref 7–18)
BUN/CREAT SERPL: 25 (ref 10–20)
CALCIUM BLD-MCNC: 9.8 MG/DL (ref 8.5–10.1)
CHLORIDE SERPL-SCNC: 104 MMOL/L (ref 98–112)
CHOLEST SMN-MCNC: 133 MG/DL (ref ?–200)
CO2 SERPL-SCNC: 30 MMOL/L (ref 21–32)
CREAT BLD-MCNC: 0.92 MG/DL (ref 0.55–1.02)
CREAT UR-SCNC: 256 MG/DL
DEPRECATED RDW RBC AUTO: 40.8 FL (ref 35.1–46.3)
EOSINOPHIL # BLD AUTO: 0.2 X10(3) UL (ref 0–0.7)
EOSINOPHIL NFR BLD AUTO: 2 %
ERYTHROCYTE [DISTWIDTH] IN BLOOD BY AUTOMATED COUNT: 12.3 % (ref 11–15)
EST. AVERAGE GLUCOSE BLD GHB EST-MCNC: 180 MG/DL (ref 68–126)
GLOBULIN PLAS-MCNC: 3.3 G/DL (ref 2.8–4.4)
GLUCOSE BLD-MCNC: 198 MG/DL (ref 70–99)
HBA1C MFR BLD HPLC: 7.9 % (ref ?–5.7)
HCT VFR BLD AUTO: 39.5 % (ref 35–48)
HDLC SERPL-MCNC: 43 MG/DL (ref 40–59)
HGB BLD-MCNC: 12.9 G/DL (ref 12–16)
IMM GRANULOCYTES # BLD AUTO: 0.06 X10(3) UL (ref 0–1)
IMM GRANULOCYTES NFR BLD: 0.6 %
LDLC SERPL CALC-MCNC: 57 MG/DL (ref ?–100)
LYMPHOCYTES # BLD AUTO: 2.94 X10(3) UL (ref 1–4)
LYMPHOCYTES NFR BLD AUTO: 29.1 %
M PROTEIN MFR SERPL ELPH: 7.1 G/DL (ref 6.4–8.2)
MCH RBC QN AUTO: 29.3 PG (ref 26–34)
MCHC RBC AUTO-ENTMCNC: 32.7 G/DL (ref 31–37)
MCV RBC AUTO: 89.6 FL (ref 80–100)
MICROALBUMIN UR-MCNC: 2.1 MG/DL
MICROALBUMIN/CREAT 24H UR-RTO: 8.2 UG/MG (ref ?–30)
MONOCYTES # BLD AUTO: 0.42 X10(3) UL (ref 0.1–1)
MONOCYTES NFR BLD AUTO: 4.2 %
NEUTROPHILS # BLD AUTO: 6.43 X10 (3) UL (ref 1.5–7.7)
NEUTROPHILS # BLD AUTO: 6.43 X10(3) UL (ref 1.5–7.7)
NEUTROPHILS NFR BLD AUTO: 63.5 %
NONHDLC SERPL-MCNC: 90 MG/DL (ref ?–130)
OSMOLALITY SERPL CALC.SUM OF ELEC: 299 MOSM/KG (ref 275–295)
PATIENT FASTING Y/N/NP: YES
PATIENT FASTING Y/N/NP: YES
PLATELET # BLD AUTO: 337 10(3)UL (ref 150–450)
POTASSIUM SERPL-SCNC: 3.9 MMOL/L (ref 3.5–5.1)
RBC # BLD AUTO: 4.41 X10(6)UL (ref 3.8–5.3)
SODIUM SERPL-SCNC: 140 MMOL/L (ref 136–145)
TRIGL SERPL-MCNC: 164 MG/DL (ref 30–149)
TSI SER-ACNC: 2.05 MIU/ML (ref 0.36–3.74)
VLDLC SERPL CALC-MCNC: 33 MG/DL (ref 0–30)
WBC # BLD AUTO: 10.1 X10(3) UL (ref 4–11)

## 2020-07-09 PROCEDURE — 36415 COLL VENOUS BLD VENIPUNCTURE: CPT

## 2020-07-09 PROCEDURE — 85025 COMPLETE CBC W/AUTO DIFF WBC: CPT

## 2020-07-09 PROCEDURE — 82570 ASSAY OF URINE CREATININE: CPT

## 2020-07-09 PROCEDURE — 80061 LIPID PANEL: CPT

## 2020-07-09 PROCEDURE — 83036 HEMOGLOBIN GLYCOSYLATED A1C: CPT

## 2020-07-09 PROCEDURE — 99214 OFFICE O/P EST MOD 30 MIN: CPT | Performed by: FAMILY MEDICINE

## 2020-07-09 PROCEDURE — 80053 COMPREHEN METABOLIC PANEL: CPT

## 2020-07-09 PROCEDURE — 20611 DRAIN/INJ JOINT/BURSA W/US: CPT | Performed by: FAMILY MEDICINE

## 2020-07-09 PROCEDURE — 90732 PPSV23 VACC 2 YRS+ SUBQ/IM: CPT | Performed by: FAMILY MEDICINE

## 2020-07-09 PROCEDURE — G0009 ADMIN PNEUMOCOCCAL VACCINE: HCPCS | Performed by: FAMILY MEDICINE

## 2020-07-09 PROCEDURE — G0463 HOSPITAL OUTPT CLINIC VISIT: HCPCS | Performed by: FAMILY MEDICINE

## 2020-07-09 PROCEDURE — 84443 ASSAY THYROID STIM HORMONE: CPT

## 2020-07-09 PROCEDURE — 82043 UR ALBUMIN QUANTITATIVE: CPT

## 2020-07-09 RX ORDER — METHYLPREDNISOLONE ACETATE 40 MG/ML
40 INJECTION, SUSPENSION INTRA-ARTICULAR; INTRALESIONAL; INTRAMUSCULAR; SOFT TISSUE ONCE
Status: COMPLETED | OUTPATIENT
Start: 2020-07-09 | End: 2020-07-09

## 2020-07-09 RX ADMIN — METHYLPREDNISOLONE ACETATE 40 MG: 40 INJECTION, SUSPENSION INTRA-ARTICULAR; INTRALESIONAL; INTRAMUSCULAR; SOFT TISSUE at 14:01:00

## 2020-07-09 NOTE — PROGRESS NOTES
Maureen Jonas is a 77year old female. Patient presents with:  Shoulder Pain: left x 2 or 3 months    HPI:   Stressed with  - being ill. Has lung cancer. Reports left shoulder pain and left achilles pain.  Reports months ago slammed her left david NIGHTLY., Disp: 30 pen, Rfl: 3  Insulin Pen Needle 32G X 6 MM Does not apply Misc, USE 4 TIMES A DAY, Disp: 400 each, Rfl: 2  Glucose Blood (ACCU-CHEK ELAINE PLUS) In Vitro Strip, Check blood sugar 3 times a day, Disp: 300 strip, Rfl: 3  Lancets Does not ap denies cough  CARDIOVASCULAR: denies chest pain  GI: denies abdominal pain and denies heartburn  NEURO: denies headaches  Musculoskeletal:pos joint pain, back pain    EXAM:   /73   Pulse 91   Temp 99 °F (37.2 °C) (Oral)   Ht 5' (1.524 m)   Wt 210 lb

## 2020-07-13 NOTE — PROGRESS NOTES
Ismael Travis - Your diabetes is getting worse again. I know you are very stressed and that is most likely raising your numbers.  Try to walk at least 30 minutes daily - can split it up with 10 minutes 3 times will help bring down the numbers. - Dr. Choco Cherry

## 2020-08-15 RX ORDER — SIMVASTATIN 20 MG
TABLET ORAL
Qty: 90 TABLET | Refills: 1 | Status: SHIPPED | OUTPATIENT
Start: 2020-08-15 | End: 2021-03-01

## 2020-08-26 ENCOUNTER — TELEPHONE (OUTPATIENT)
Dept: FAMILY MEDICINE CLINIC | Facility: CLINIC | Age: 65
End: 2020-08-26

## 2020-08-26 RX ORDER — FENOFIBRATE 200 MG/1
200 CAPSULE ORAL
Qty: 90 CAPSULE | Refills: 4 | Status: SHIPPED | OUTPATIENT
Start: 2020-08-26 | End: 2021-05-27

## 2020-08-26 NOTE — TELEPHONE ENCOUNTER
FENOFIBRATE 145 MG Oral Tab  Pharmacy calling, 145 mg copay is $42, the 200mg is only $17, would it be possible to change dose? Please advise.

## 2020-08-27 NOTE — TELEPHONE ENCOUNTER
Called patient and informed her of note below. She voiced understanding and wanted to know if her 's albuterol was filled, informed her that it has also been sent to the pharmacy. No other questions/concerns.

## 2020-09-30 RX ORDER — LISINOPRIL 30 MG/1
TABLET ORAL
Qty: 90 TABLET | Refills: 1 | Status: SHIPPED | OUTPATIENT
Start: 2020-09-30 | End: 2021-03-24

## 2020-10-26 ENCOUNTER — NURSE TRIAGE (OUTPATIENT)
Dept: FAMILY MEDICINE CLINIC | Facility: CLINIC | Age: 65
End: 2020-10-26

## 2020-10-26 NOTE — TELEPHONE ENCOUNTER
Action Requested: Summary for Provider     []  Critical Lab, Recommendations Needed  [] Need Additional Advice  []   FYI    []   Need Orders  [] Need Medications Sent to Pharmacy  []  Other     SUMMARY:    Please advise.    The patient is asking to be seen

## 2020-10-27 ENCOUNTER — OFFICE VISIT (OUTPATIENT)
Dept: FAMILY MEDICINE CLINIC | Facility: CLINIC | Age: 65
End: 2020-10-27
Payer: MEDICARE

## 2020-10-27 VITALS
TEMPERATURE: 98 F | HEART RATE: 76 BPM | BODY MASS INDEX: 41.43 KG/M2 | SYSTOLIC BLOOD PRESSURE: 130 MMHG | DIASTOLIC BLOOD PRESSURE: 76 MMHG | WEIGHT: 211 LBS | HEIGHT: 60 IN

## 2020-10-27 DIAGNOSIS — Z79.4 TYPE 2 DIABETES MELLITUS WITHOUT COMPLICATION, WITH LONG-TERM CURRENT USE OF INSULIN (HCC): Primary | ICD-10-CM

## 2020-10-27 DIAGNOSIS — I05.9 MITRAL VALVE ANNULAR CALCIFICATION: ICD-10-CM

## 2020-10-27 DIAGNOSIS — F43.21 GRIEF: ICD-10-CM

## 2020-10-27 DIAGNOSIS — M54.2 NECK PAIN: ICD-10-CM

## 2020-10-27 DIAGNOSIS — I10 ESSENTIAL HYPERTENSION, BENIGN: ICD-10-CM

## 2020-10-27 DIAGNOSIS — E11.9 TYPE 2 DIABETES MELLITUS WITHOUT COMPLICATION, WITH LONG-TERM CURRENT USE OF INSULIN (HCC): Primary | ICD-10-CM

## 2020-10-27 DIAGNOSIS — E78.2 MIXED HYPERLIPIDEMIA: ICD-10-CM

## 2020-10-27 PROCEDURE — 99214 OFFICE O/P EST MOD 30 MIN: CPT | Performed by: FAMILY MEDICINE

## 2020-10-27 PROCEDURE — G0463 HOSPITAL OUTPT CLINIC VISIT: HCPCS | Performed by: FAMILY MEDICINE

## 2020-10-27 RX ORDER — A/SINGAPORE/GP1908/2015 IVR-180 (AN A/MICHIGAN/45/2015 (H1N1)PDM09-LIKE VIRUS, A/HONG KONG/4801/2014, NYMC X-263B (H3N2) (AN A/HONG KONG/4801/2014-LIKE VIRUS), AND B/BRISBANE/60/2008, WILD TYPE (A B/BRISBANE/60/2008-LIKE VIRUS) 15; 15; 15 UG/.5ML; UG/.5ML; UG/.5ML
INJECTION, SUSPENSION INTRAMUSCULAR
COMMUNITY
Start: 2020-09-26

## 2020-10-27 RX ORDER — CYCLOBENZAPRINE HCL 5 MG
5 TABLET ORAL 3 TIMES DAILY PRN
Qty: 30 TABLET | Refills: 0 | Status: SHIPPED | OUTPATIENT
Start: 2020-10-27 | End: 2020-12-20

## 2020-10-27 RX ORDER — MELOXICAM 15 MG/1
15 TABLET ORAL DAILY
Qty: 90 TABLET | Refills: 1 | Status: SHIPPED | OUTPATIENT
Start: 2020-10-27 | End: 2021-05-05

## 2020-10-27 RX ORDER — LEVOCETIRIZINE DIHYDROCHLORIDE 5 MG/1
5 TABLET, FILM COATED ORAL EVERY EVENING
Qty: 90 TABLET | Refills: 0 | Status: SHIPPED | OUTPATIENT
Start: 2020-10-27 | End: 2021-01-18

## 2020-10-27 NOTE — PROGRESS NOTES
Elizabeth James is a 77year old female. Patient presents with:  Neck Pain: Onset 2 days. Allergies: runny nose, sneezing, and watery eyes    HPI:   Reports left neck pain for about 2 days. No numbness or weakness in her arm. No tingling.  Has been havin Disp: 180 tablet, Rfl: 4    •  Meloxicam 15 MG Oral Tab, Take 1 tablet (15 mg total) by mouth daily. , Disp: 90 tablet, Rfl: 1    •  clotrimazole-betamethasone 1-0.05 % External Cream, Apply 1 Application topically 2 (two) times daily. , Disp: 60 g, Rfl: 3 cell tumor   • PONV (postoperative nausea and vomiting)    • Rheumatoid arthritis (HCC)    • [facility] ([facility] 2001      Social History:  Social History    Tobacco Use      Smoking status: Never Smoker      Smokeless tobacco: Never Used    Alc annular calcification  History of calcification and mild diastolic dysfunction in 3731 due for repeat echo  - CARD ECHO 2D DOPPLER (CPT=93306); Future    6.  Grief  Normal grieving most likely contributing to muscle spasms and body aches      The patient in

## 2020-11-02 RX ORDER — ALPRAZOLAM 0.25 MG/1
TABLET ORAL
Qty: 30 TABLET | Refills: 5 | Status: SHIPPED | OUTPATIENT
Start: 2020-11-02

## 2020-11-04 RX ORDER — INSULIN DETEMIR 100 [IU]/ML
70 INJECTION, SOLUTION SUBCUTANEOUS NIGHTLY
Qty: 30 PEN | Refills: 3 | Status: SHIPPED | OUTPATIENT
Start: 2020-11-04 | End: 2022-07-24

## 2020-11-11 ENCOUNTER — TELEPHONE (OUTPATIENT)
Dept: FAMILY MEDICINE CLINIC | Facility: CLINIC | Age: 65
End: 2020-11-11

## 2020-11-11 ENCOUNTER — PATIENT MESSAGE (OUTPATIENT)
Dept: FAMILY MEDICINE CLINIC | Facility: CLINIC | Age: 65
End: 2020-11-11

## 2020-11-11 DIAGNOSIS — M62.838 NECK MUSCLE SPASM: Primary | ICD-10-CM

## 2020-11-11 RX ORDER — CYCLOBENZAPRINE HCL 5 MG
5 TABLET ORAL NIGHTLY
Qty: 30 TABLET | Refills: 0 | Status: SHIPPED | OUTPATIENT
Start: 2020-11-11

## 2020-11-11 NOTE — TELEPHONE ENCOUNTER
Yes she can meloxicam with it. Will renew flexeril and referral for physical therapy being placed. Will be helpful.  She has medicare so can go to some place close to house or here

## 2020-11-11 NOTE — TELEPHONE ENCOUNTER
SUMMARY: neck pain with limited movement for about 2 weeks. Cannot completely turn her head to her left. States it feels like a pinched nerve. Taking flexeril BID. Not sure if she was supposed to take Meloxicam with it.    She wants you to know she only

## 2020-11-11 NOTE — TELEPHONE ENCOUNTER
Spoke with patient, (  verified ) informed of Dr. Wing Acevedo  instructions below  Provided with  Number to  [phone]    Patient verbalizes understanding and agrees.

## 2020-11-30 ENCOUNTER — HOSPITAL ENCOUNTER (OUTPATIENT)
Dept: CV DIAGNOSTICS | Facility: HOSPITAL | Age: 65
Discharge: HOME | End: 2020-11-30
Attending: FAMILY MEDICINE
Payer: MEDICARE

## 2020-11-30 DIAGNOSIS — I05.9 MITRAL VALVE ANNULAR CALCIFICATION: ICD-10-CM

## 2020-11-30 PROCEDURE — 93306 TTE W/DOPPLER COMPLETE: CPT | Performed by: FAMILY MEDICINE

## 2020-12-02 NOTE — PROGRESS NOTES
Ismael Arriaga I placed a referral for cardiologist Dr. Jennifer Stephens. Taras Hickman, DO   [address] [address]  Phone  [phone]

## 2020-12-02 NOTE — PROGRESS NOTES
[facility] - Your echo is stable - no changes. Still mild changes on your valves.  Please follow up with cardiologist to make no other necessary tests at this time. - Dr. Mirza Santana

## 2020-12-14 ENCOUNTER — OFFICE VISIT (OUTPATIENT)
Dept: PHYSICAL THERAPY | Age: 65
End: 2020-12-14
Attending: FAMILY MEDICINE
Payer: MEDICARE

## 2020-12-14 DIAGNOSIS — M62.838 NECK MUSCLE SPASM: ICD-10-CM

## 2020-12-14 PROCEDURE — 97161 PT EVAL LOW COMPLEX 20 MIN: CPT

## 2020-12-14 PROCEDURE — 97110 THERAPEUTIC EXERCISES: CPT

## 2020-12-14 NOTE — PROGRESS NOTES
P.T. EVALUATION:   Referring Physician: Dr. Irineo Najjar  Diagnosis: Neck muscle spasm (W67.021)     Date of Onset: October 27, 2020 Date of Service: 12/14/2020     PATIENT SUMMARY   Ahsan Price is a 77year old y/o adult who presents to therapy today with c (pain)  Shoulder IR/HBB: R T8, L T12    Strength/MMT:   Shoulder flx: R 4+/5, L 4+/5 (pain)  Shoulder abd: R 5/5, L 3-/5 (pain)  Shoulder ER: R 5/5, L 4+/5,  Shoulder IR: R 5/5, L 4+/5  Biceps: R 4/5, L 5/5  Triceps: R 5/5, L 4-/5    Posture: forward head

## 2020-12-16 ENCOUNTER — OFFICE VISIT (OUTPATIENT)
Dept: PHYSICAL THERAPY | Age: 65
End: 2020-12-16
Attending: FAMILY MEDICINE
Payer: MEDICARE

## 2020-12-16 PROCEDURE — 97110 THERAPEUTIC EXERCISES: CPT

## 2020-12-16 PROCEDURE — 97140 MANUAL THERAPY 1/> REGIONS: CPT

## 2020-12-16 NOTE — PROGRESS NOTES
Diagnosis: Neck muscle spasm (M62.838)       Next MD visit: none scheduled  Fall Risk: standard         Precautions: n/a          Medication Changes since last visit?: No    Subjective: Pt reports stiffness and soreness in left upper trap region.  She repor

## 2020-12-20 DIAGNOSIS — M54.2 NECK PAIN: ICD-10-CM

## 2020-12-21 ENCOUNTER — OFFICE VISIT (OUTPATIENT)
Dept: PHYSICAL THERAPY | Age: 65
End: 2020-12-21
Attending: FAMILY MEDICINE
Payer: MEDICARE

## 2020-12-21 PROCEDURE — 97110 THERAPEUTIC EXERCISES: CPT

## 2020-12-21 PROCEDURE — 97035 APP MDLTY 1+ULTRASOUND EA 15: CPT

## 2020-12-21 RX ORDER — CYCLOBENZAPRINE HCL 5 MG
TABLET ORAL
Qty: 30 TABLET | Refills: 0 | OUTPATIENT
Start: 2020-12-21

## 2020-12-21 RX ORDER — CYCLOBENZAPRINE HCL 5 MG
5 TABLET ORAL 3 TIMES DAILY PRN
Qty: 30 TABLET | Refills: 0 | Status: SHIPPED | OUTPATIENT
Start: 2020-12-21 | End: 2021-12-22

## 2020-12-21 NOTE — PROGRESS NOTES
Diagnosis: Neck muscle spasm (M62.838)       Next MD visit: none scheduled  Fall Risk: standard         Precautions: n/a          Medication Changes since last visit?: No    Subjective: Pt reports stiffness and pain over the weekend specifically on Satur[place] Treatment: 45  min  Total Treatment Time: 45 min

## 2020-12-22 RX ORDER — PANTOPRAZOLE SODIUM 40 MG/1
TABLET, DELAYED RELEASE ORAL
Qty: 90 TABLET | Refills: 1 | Status: SHIPPED | OUTPATIENT
Start: 2020-12-22 | End: 2021-06-14

## 2020-12-22 RX ORDER — AMLODIPINE BESYLATE 5 MG/1
TABLET ORAL
Qty: 90 TABLET | Refills: 1 | Status: SHIPPED | OUTPATIENT
Start: 2020-12-22 | End: 2021-06-12

## 2020-12-23 ENCOUNTER — OFFICE VISIT (OUTPATIENT)
Dept: PHYSICAL THERAPY | Age: 65
End: 2020-12-23
Attending: FAMILY MEDICINE
Payer: MEDICARE

## 2020-12-23 PROCEDURE — 97035 APP MDLTY 1+ULTRASOUND EA 15: CPT

## 2020-12-23 PROCEDURE — 97110 THERAPEUTIC EXERCISES: CPT

## 2020-12-23 NOTE — PROGRESS NOTES
Diagnosis: Neck muscle spasm (M62.838)       Next MD visit: none scheduled  Fall Risk: standard         Precautions: n/a          Medication Changes since last visit?: No    Subjective: Pt reports her neck is doing much better and she reports no pain donald upper trap muscle x 8 minutes in sitting -                Assessment: Pt displaying improved cervical ROM today. Initiated scapular strengthening exercises this session.       Plan: continue PT    Charges: Ex 2 US 1     Total Timed Treatment: 42  min  Total

## 2020-12-28 ENCOUNTER — OFFICE VISIT (OUTPATIENT)
Dept: PHYSICAL THERAPY | Age: 65
End: 2020-12-28
Attending: FAMILY MEDICINE
Payer: MEDICARE

## 2020-12-28 PROCEDURE — 97035 APP MDLTY 1+ULTRASOUND EA 15: CPT

## 2020-12-28 PROCEDURE — 97110 THERAPEUTIC EXERCISES: CPT

## 2020-12-28 NOTE — PROGRESS NOTES
Diagnosis: Neck muscle spasm (M62.838)       Next MD visit: none scheduled  Fall Risk: standard         Precautions: n/a          Medication Changes since last visit?: No    Subjective: Pt reports her neck is doing much better and she reports she only has towel against wall 5x 5 sec holds (elicits headaches d/c)  - supine R/L c-rotation 5x ea  - supine B chest press 3# 1x10  - supine B shoulder horizontal abd/add 0# 2x10  - repeat seated c-retraction 10x     Manual Therapy      - supine cervical distraction

## 2020-12-30 ENCOUNTER — OFFICE VISIT (OUTPATIENT)
Dept: PHYSICAL THERAPY | Age: 65
End: 2020-12-30
Attending: FAMILY MEDICINE
Payer: MEDICARE

## 2020-12-30 PROCEDURE — 97035 APP MDLTY 1+ULTRASOUND EA 15: CPT

## 2020-12-30 PROCEDURE — 97110 THERAPEUTIC EXERCISES: CPT

## 2020-12-30 NOTE — PROGRESS NOTES
Diagnosis: Neck muscle spasm (M62.838)       Next MD visit: none scheduled  Fall Risk: standard         Precautions: n/a          Medication Changes since last visit?: No    Subjective:  Patient reports her neck is doing okay today.  She reports it was a li ea  - standing B scap rows with GSC 2x10  - standing B shoulder ext with [address] 2x10   - cervical reassessment  - seated c-retraction 57C (elicits right sided headache)  - seated c-extension with towel 1x10, 1x10 (NE)  - seated c-rotation to left with towel 1

## 2021-01-04 ENCOUNTER — OFFICE VISIT (OUTPATIENT)
Dept: PHYSICAL THERAPY | Age: 66
End: 2021-01-04
Attending: FAMILY MEDICINE
Payer: MEDICARE

## 2021-01-04 PROCEDURE — 97110 THERAPEUTIC EXERCISES: CPT

## 2021-01-04 PROCEDURE — 97035 APP MDLTY 1+ULTRASOUND EA 15: CPT

## 2021-01-04 NOTE — PROGRESS NOTES
Diagnosis: Neck muscle spasm (M62.838)       Next MD visit: none scheduled  Fall Risk: standard         Precautions: n/a          Medication Changes since last visit?: No    Subjective:  Patient reports her neck has been doing well.  She reports she did hav c-rotation 15x ea  - seated scap squeezes over ball 2x10  - supine B chest press 3# 2x10  - supine B shoulder horizontal abd/add 1# 2x10  - supine B shoulder flexion with wand 2x10  - supine R/L c-rotation 10x ea  - standing B shoulder scaption 1# 2x10  -

## 2021-01-05 RX ORDER — FLUTICASONE PROPIONATE 50 MCG
SPRAY, SUSPENSION (ML) NASAL
Qty: 3 BOTTLE | Refills: 1 | Status: SHIPPED | OUTPATIENT
Start: 2021-01-05

## 2021-01-06 ENCOUNTER — OFFICE VISIT (OUTPATIENT)
Dept: PHYSICAL THERAPY | Age: 66
End: 2021-01-06
Attending: FAMILY MEDICINE
Payer: MEDICARE

## 2021-01-06 PROCEDURE — 97110 THERAPEUTIC EXERCISES: CPT

## 2021-01-06 PROCEDURE — 97035 APP MDLTY 1+ULTRASOUND EA 15: CPT

## 2021-01-06 NOTE — PROGRESS NOTES
Physical Therapy Discharge Summary  Diagnosis: Neck muscle spasm (P34.032)       Next MD visit: none scheduled  Fall Risk: standard         Precautions: n/a          Medication Changes since last visit?: No  Subjective:  Pt reports she is feeling better to squeezes over ball 2x10  - seated R/L c-lateral flexion 5x ea  - supine B chest press 3# 2x10  - supine B shoulder horizontal abd/add 1# 2x10  - supine B alternating shoulder flexion 1# 20x  - supine B shoulder flexion with wand 1x15  - supine R/L c-rotati flexion 10 minutes   Therapeutic Activity         -    Modalities   - US to L upper trap muscle x 8 minutes in sitting - US to L upper trap muscle x 8 minutes in sitting - US to L upper trap muscle x 8 minutes in sitting - US to L upper trap muscle x 8 min

## 2021-01-18 RX ORDER — LEVOCETIRIZINE DIHYDROCHLORIDE 5 MG/1
TABLET, FILM COATED ORAL
Qty: 90 TABLET | Refills: 0 | Status: SHIPPED | OUTPATIENT
Start: 2021-01-18 | End: 2021-04-15

## 2021-01-30 ENCOUNTER — LAB ENCOUNTER (OUTPATIENT)
Dept: LAB | Age: 66
End: 2021-01-30
Attending: FAMILY MEDICINE
Payer: MEDICARE

## 2021-01-30 DIAGNOSIS — Z79.4 TYPE 2 DIABETES MELLITUS WITHOUT COMPLICATION, WITH LONG-TERM CURRENT USE OF INSULIN (HCC): ICD-10-CM

## 2021-01-30 DIAGNOSIS — E11.9 TYPE 2 DIABETES MELLITUS WITHOUT COMPLICATION, WITH LONG-TERM CURRENT USE OF INSULIN (HCC): ICD-10-CM

## 2021-01-30 LAB
ALBUMIN SERPL-MCNC: 3.7 G/DL (ref 3.4–5)
ALBUMIN/GLOB SERPL: 0.9 {RATIO} (ref 1–2)
ALP LIVER SERPL-CCNC: 46 U/L
ALT SERPL-CCNC: 30 U/L
ANION GAP SERPL CALC-SCNC: 5 MMOL/L (ref 0–18)
AST SERPL-CCNC: 28 U/L (ref 15–37)
BASOPHILS # BLD AUTO: 0.05 X10(3) UL (ref 0–0.2)
BASOPHILS NFR BLD AUTO: 0.7 %
BILIRUB SERPL-MCNC: 0.4 MG/DL (ref 0.1–2)
BUN BLD-MCNC: 20 MG/DL (ref 7–18)
BUN/CREAT SERPL: 22 (ref 10–20)
CALCIUM BLD-MCNC: 9.5 MG/DL (ref 8.5–10.1)
CHLORIDE SERPL-SCNC: 106 MMOL/L (ref 98–112)
CHOLEST SMN-MCNC: 157 MG/DL (ref ?–200)
CO2 SERPL-SCNC: 29 MMOL/L (ref 21–32)
CREAT BLD-MCNC: 0.91 MG/DL
DEPRECATED RDW RBC AUTO: 40.6 FL (ref 35.1–46.3)
EOSINOPHIL # BLD AUTO: 0.23 X10(3) UL (ref 0–0.7)
EOSINOPHIL NFR BLD AUTO: 3 %
ERYTHROCYTE [DISTWIDTH] IN BLOOD BY AUTOMATED COUNT: 12.4 % (ref 11–15)
EST. AVERAGE GLUCOSE BLD GHB EST-MCNC: 200 MG/DL (ref 68–126)
GLOBULIN PLAS-MCNC: 3.9 G/DL (ref 2.8–4.4)
GLUCOSE BLD-MCNC: 161 MG/DL (ref 70–99)
HBA1C MFR BLD HPLC: 8.6 % (ref ?–5.7)
HCT VFR BLD AUTO: 41.4 %
HDLC SERPL-MCNC: 49 MG/DL (ref 40–59)
HGB BLD-MCNC: 13.3 G/DL
IMM GRANULOCYTES # BLD AUTO: 0.04 X10(3) UL (ref 0–1)
IMM GRANULOCYTES NFR BLD: 0.5 %
LDLC SERPL CALC-MCNC: 74 MG/DL (ref ?–100)
LYMPHOCYTES # BLD AUTO: 2.69 X10(3) UL (ref 1–4)
LYMPHOCYTES NFR BLD AUTO: 35.2 %
M PROTEIN MFR SERPL ELPH: 7.6 G/DL (ref 6.4–8.2)
MCH RBC QN AUTO: 28.7 PG (ref 26–34)
MCHC RBC AUTO-ENTMCNC: 32.1 G/DL (ref 31–37)
MCV RBC AUTO: 89.4 FL
MONOCYTES # BLD AUTO: 0.37 X10(3) UL (ref 0.1–1)
MONOCYTES NFR BLD AUTO: 4.8 %
NEUTROPHILS # BLD AUTO: 4.27 X10 (3) UL (ref 1.5–7.7)
NEUTROPHILS # BLD AUTO: 4.27 X10(3) UL (ref 1.5–7.7)
NEUTROPHILS NFR BLD AUTO: 55.8 %
NONHDLC SERPL-MCNC: 108 MG/DL (ref ?–130)
OSMOLALITY SERPL CALC.SUM OF ELEC: 296 MOSM/KG (ref 275–295)
PATIENT FASTING Y/N/NP: YES
PATIENT FASTING Y/N/NP: YES
PLATELET # BLD AUTO: 375 10(3)UL (ref 150–450)
POTASSIUM SERPL-SCNC: 4.3 MMOL/L (ref 3.5–5.1)
RBC # BLD AUTO: 4.63 X10(6)UL
SODIUM SERPL-SCNC: 140 MMOL/L (ref 136–145)
TRIGL SERPL-MCNC: 168 MG/DL (ref 30–149)
TSI SER-ACNC: 2.66 MIU/ML (ref 0.36–3.74)
VLDLC SERPL CALC-MCNC: 34 MG/DL (ref 0–30)
WBC # BLD AUTO: 7.7 X10(3) UL (ref 4–11)

## 2021-01-30 PROCEDURE — 85025 COMPLETE CBC W/AUTO DIFF WBC: CPT

## 2021-01-30 PROCEDURE — 80061 LIPID PANEL: CPT

## 2021-01-30 PROCEDURE — 83036 HEMOGLOBIN GLYCOSYLATED A1C: CPT

## 2021-01-30 PROCEDURE — 36415 COLL VENOUS BLD VENIPUNCTURE: CPT

## 2021-01-30 PROCEDURE — 80053 COMPREHEN METABOLIC PANEL: CPT

## 2021-01-30 PROCEDURE — 84443 ASSAY THYROID STIM HORMONE: CPT

## 2021-02-02 DIAGNOSIS — Z23 NEED FOR VACCINATION: ICD-10-CM

## 2021-02-02 NOTE — PROGRESS NOTES
Seema Aden Birthday! Unfortunately your diabetes getting worse. I would like you to see one of our endocrinologists - diabetes specialist. I had placed a referral last year. You can call [phone] to scheduled one.  The first appointment could proba

## 2021-02-06 RX ORDER — BLOOD SUGAR DIAGNOSTIC
STRIP MISCELLANEOUS
Qty: 300 STRIP | Refills: 3 | Status: SHIPPED | OUTPATIENT
Start: 2021-02-06 | End: 2021-02-09

## 2021-02-09 ENCOUNTER — PATIENT MESSAGE (OUTPATIENT)
Dept: FAMILY MEDICINE CLINIC | Facility: CLINIC | Age: 66
End: 2021-02-09

## 2021-02-09 RX ORDER — BLOOD SUGAR DIAGNOSTIC
1 STRIP MISCELLANEOUS 3 TIMES DAILY
Qty: 300 STRIP | Refills: 3 | Status: SHIPPED | OUTPATIENT
Start: 2021-02-09 | End: 2021-05-11

## 2021-02-09 NOTE — TELEPHONE ENCOUNTER
From: Jose Kaplan  To: Erika Fernandez MD  Sent: 2/9/2021 10:53 AM CST  Subject: Prescription Question    Dr. Gloria Oliveira, could you please see that the medical billing code for my testing strips is given to the [facility] pharmacy as they are unable to fill them

## 2021-02-15 RX ORDER — METFORMIN HYDROCHLORIDE 500 MG/1
TABLET, EXTENDED RELEASE ORAL
Qty: 180 TABLET | Refills: 2 | Status: SHIPPED | OUTPATIENT
Start: 2021-02-15 | End: 2021-11-05

## 2021-03-01 RX ORDER — SIMVASTATIN 20 MG
TABLET ORAL
Qty: 90 TABLET | Refills: 1 | OUTPATIENT
Start: 2021-03-01

## 2021-03-01 NOTE — TELEPHONE ENCOUNTER
Spoke with patient, (  verified ) states she no longer takes the medication and will [facility] tomorrow to cancel the RX     RX cancelled on her med record

## 2021-03-24 RX ORDER — LISINOPRIL 30 MG/1
30 TABLET ORAL DAILY
Qty: 90 TABLET | Refills: 1 | Status: SHIPPED | OUTPATIENT
Start: 2021-03-24 | End: 2021-09-13

## 2021-04-15 RX ORDER — LEVOCETIRIZINE DIHYDROCHLORIDE 5 MG/1
5 TABLET, FILM COATED ORAL EVERY EVENING
Qty: 90 TABLET | Refills: 0 | Status: SHIPPED | OUTPATIENT
Start: 2021-04-15 | End: 2022-03-25

## 2021-04-19 RX ORDER — INSULIN ASPART 100 [IU]/ML
INJECTION, SOLUTION INTRAVENOUS; SUBCUTANEOUS
Qty: 20 PEN | Refills: 2 | Status: SHIPPED | OUTPATIENT
Start: 2021-04-19

## 2021-05-04 ENCOUNTER — TELEPHONE (OUTPATIENT)
Dept: FAMILY MEDICINE CLINIC | Facility: CLINIC | Age: 66
End: 2021-05-04

## 2021-05-04 NOTE — TELEPHONE ENCOUNTER
Patient is scheduled with Dr. Arlene Medel on 05/05/2021. Advise patient of lab order will be provider by Dr. Arlene Medel.

## 2021-05-05 ENCOUNTER — OFFICE VISIT (OUTPATIENT)
Dept: ENDOCRINOLOGY CLINIC | Facility: CLINIC | Age: 66
End: 2021-05-05
Payer: MEDICARE

## 2021-05-05 ENCOUNTER — LAB ENCOUNTER (OUTPATIENT)
Dept: LAB | Age: 66
End: 2021-05-05
Attending: FAMILY MEDICINE
Payer: MEDICARE

## 2021-05-05 ENCOUNTER — TELEPHONE (OUTPATIENT)
Dept: ENDOCRINOLOGY CLINIC | Facility: CLINIC | Age: 66
End: 2021-05-05

## 2021-05-05 VITALS
SYSTOLIC BLOOD PRESSURE: 116 MMHG | WEIGHT: 213 LBS | HEART RATE: 75 BPM | DIASTOLIC BLOOD PRESSURE: 76 MMHG | BODY MASS INDEX: 42 KG/M2

## 2021-05-05 DIAGNOSIS — Z79.4 TYPE 2 DIABETES MELLITUS WITHOUT COMPLICATION, WITH LONG-TERM CURRENT USE OF INSULIN (HCC): ICD-10-CM

## 2021-05-05 DIAGNOSIS — E11.65 UNCONTROLLED TYPE 2 DIABETES MELLITUS WITH HYPERGLYCEMIA (HCC): Primary | ICD-10-CM

## 2021-05-05 DIAGNOSIS — E11.9 TYPE 2 DIABETES MELLITUS WITHOUT COMPLICATION, WITH LONG-TERM CURRENT USE OF INSULIN (HCC): ICD-10-CM

## 2021-05-05 PROCEDURE — 36415 COLL VENOUS BLD VENIPUNCTURE: CPT

## 2021-05-05 PROCEDURE — 83036 HEMOGLOBIN GLYCOSYLATED A1C: CPT

## 2021-05-05 PROCEDURE — 82947 ASSAY GLUCOSE BLOOD QUANT: CPT | Performed by: INTERNAL MEDICINE

## 2021-05-05 PROCEDURE — 83036 HEMOGLOBIN GLYCOSYLATED A1C: CPT | Performed by: INTERNAL MEDICINE

## 2021-05-05 PROCEDURE — 36416 COLLJ CAPILLARY BLOOD SPEC: CPT | Performed by: INTERNAL MEDICINE

## 2021-05-05 PROCEDURE — 99204 OFFICE O/P NEW MOD 45 MIN: CPT | Performed by: INTERNAL MEDICINE

## 2021-05-05 RX ORDER — MELOXICAM 15 MG/1
15 TABLET ORAL DAILY
Qty: 90 TABLET | Refills: 1 | Status: SHIPPED | OUTPATIENT
Start: 2021-05-05 | End: 2021-10-26

## 2021-05-05 NOTE — TELEPHONE ENCOUNTER
Paperwork completed and placed on Dr. Sugey Mcduffie desk for review and signature. Will fax to [facility] once signed.

## 2021-05-05 NOTE — PATIENT INSTRUCTIONS
DECREASE Levemir 66 units subcutaneous bedtime    STOP Glimepiride     DECREASE Metformin to 500mg per day    Novolog  INSULIN SLIDING SCALE  Base Values  Breakfast: 18  Lunch: 18  Dinner: 22  Ranges:  80-99: -2  100-119: 0  120-139: 0  140-159: 0  160-179

## 2021-05-05 NOTE — PROGRESS NOTES
Name: Scottie Ramos  Date: 5/5/2021    Referring Physician: No ref. provider found    HISTORY OF PRESENT ILLNESS   Scottie Ramos is a 74year old female who presents for diabetes mellitus, diagnosed in 2001.      Prior HbA, C or glycohemoglobin were 8 tablet, Rfl: 2  •  Glucose Blood (ACCU-CHEK ELAINE PLUS) In Vitro Strip, 1 strip by In Vitro route 3 (three) times daily.  E11.65 diabetes type 2 with insulin use., Disp: 300 strip, Rfl: 3  •  Fluticasone Propionate 50 MCG/ACT Nasal Suspension, USE 2 SPRAYS tablet by mouth once daily. , Disp: 30 tablet, Rfl: 2  •  Ferrous Sulfate (KP FERROUS SULFATE) 325 (65 FE) MG Oral Tab, Take 1 tablet by mouth daily with breakfast.  , Disp: , Rfl:      Allergies:     Codeine                 NAUSEA AND VOMITING    Comment:A 11/04/2017    Laparoscopic, Dee/Trav   • VAGINAL HYSTERECTOMY  1984   • YAG IRIDECTOMY - OU - BOTH EYES      OS 5/10/12, OD 4/19/12         PHYSICAL EXAM  /76   Pulse 75   Wt 213 lb (96.6 kg)   BMI 41.60 kg/m²     General Appearance:  alert, well

## 2021-05-07 ENCOUNTER — TELEPHONE (OUTPATIENT)
Dept: FAMILY MEDICINE CLINIC | Facility: CLINIC | Age: 66
End: 2021-05-07

## 2021-05-09 NOTE — PROGRESS NOTES
Ismael Travis - I see you had it done in the office with Dr. Opal Kinsey and was a bit lower there. But still overall better than 3 months ago.  Plan per Dr. Opal Kinsey. - Dr. Siu Drought

## 2021-05-11 ENCOUNTER — TELEPHONE (OUTPATIENT)
Dept: FAMILY MEDICINE CLINIC | Facility: CLINIC | Age: 66
End: 2021-05-11

## 2021-05-11 RX ORDER — BLOOD SUGAR DIAGNOSTIC
1 STRIP MISCELLANEOUS 3 TIMES DAILY
Qty: 300 STRIP | Refills: 1 | Status: SHIPPED | OUTPATIENT
Start: 2021-05-11

## 2021-05-11 NOTE — TELEPHONE ENCOUNTER
Script sent per diabetic protocol  Diabetes Medications  Protocol Criteria:  · Appointment scheduled in the past 6 months or the next 3 months  · A1C < 7.5 in the past 6 months  · Creatinine in the past 12 months  · Creatinine result < 1.5   Recent Outpati

## 2021-05-11 NOTE — TELEPHONE ENCOUNTER
Glucose Blood (ACCU-CHEK ELAINE PLUS) In Vitro Strip, 1 strip by In Vitro route 3 (three) times daily.  E11.65 diabetes type 2 with insulin use., Disp: 300 strip, Rfl: 3

## 2021-05-14 NOTE — TELEPHONE ENCOUNTER
Received fax from [facility] stating that Order is ready and that patient should receive supplies 3-5 business days.

## 2021-05-18 ENCOUNTER — HOSPITAL ENCOUNTER (OUTPATIENT)
Age: 66
Discharge: HOME | End: 2021-05-18
Attending: PHYSICIAN ASSISTANT
Payer: MEDICARE

## 2021-05-18 ENCOUNTER — APPOINTMENT (OUTPATIENT)
Dept: GENERAL RADIOLOGY | Age: 66
End: 2021-05-18
Attending: PHYSICIAN ASSISTANT
Payer: MEDICARE

## 2021-05-18 VITALS
HEIGHT: 60 IN | OXYGEN SATURATION: 98 % | BODY MASS INDEX: 41.23 KG/M2 | TEMPERATURE: 98 F | WEIGHT: 210 LBS | RESPIRATION RATE: 18 BRPM | DIASTOLIC BLOOD PRESSURE: 79 MMHG | HEART RATE: 72 BPM | SYSTOLIC BLOOD PRESSURE: 153 MMHG

## 2021-05-18 DIAGNOSIS — R03.0 ELEVATED BLOOD PRESSURE READING: ICD-10-CM

## 2021-05-18 DIAGNOSIS — M65.352 TRIGGER LITTLE FINGER OF LEFT HAND: ICD-10-CM

## 2021-05-18 DIAGNOSIS — S63.617A SPRAIN OF LEFT LITTLE FINGER, UNSPECIFIED SITE OF DIGIT, INITIAL ENCOUNTER: Primary | ICD-10-CM

## 2021-05-18 PROCEDURE — 99213 OFFICE O/P EST LOW 20 MIN: CPT | Performed by: PHYSICIAN ASSISTANT

## 2021-05-18 PROCEDURE — 73130 X-RAY EXAM OF HAND: CPT | Performed by: PHYSICIAN ASSISTANT

## 2021-05-18 RX ORDER — NAPROXEN 500 MG/1
500 TABLET ORAL 2 TIMES DAILY PRN
Qty: 10 TABLET | Refills: 0 | Status: SHIPPED | OUTPATIENT
Start: 2021-05-18 | End: 2021-05-20

## 2021-05-18 NOTE — ED PROVIDER NOTES
Patient Seen in: [facility]    History   Patient presents with:  Hand Injury    Stated Complaint: inj lt hand little finger    HPI    HPI: Cloteal Keila is a 74year old female who presents with chief complaint of pain of the left fifth di OS 5/10/12, OD 4/19/12       Medications :   naproxen 500 MG Oral Tab,  Take 1 tablet (500 mg total) by mouth 2 (two) times daily as needed. Glucose Blood (ACCU-CHEK ELAINE PLUS) In Vitro Strip,  1 strip by In Vitro route 3 (three) times daily.  E11.65 ivonne SUPER STRENGTH 2000 UNITS Oral Tab,  TAKE ONE TABLET BY MOUTH ONE TIME DAILY    Vitamin B-12 (VITAMIN B12) 1000 MCG Oral Tab,  Take 1 tablet by mouth once daily.    Ferrous Sulfate ( FERROUS SULFATE) 325 (65 FE) MG Oral Tab,  Take 1 tablet by mouth daily wall.  Respiratory: Respiratory effort was normal.  There is no rales, wheezes, or rhonchi. There is no stridor. Air entry is equal.  Cardiovascular: Regular rate and rhythm, no murmurs, gallops, rubs. Capillary refill is brisk. No extremity edema.   Melissa Davis X-ray images of left hand viewed by this provider–no acute fracture. Physical exam remained stable over serial reexaminations as previously documented. Results reviewed with patient.     I have given the patient instructions regarding their diagnose

## 2021-05-18 NOTE — ED INITIAL ASSESSMENT (HPI)
Pt complaining of left 5th finger ongoing for 2 weeks. Pt states today she was walking her dog and the leash pulled on her left hand. Pt has pain the left 5th finger extending into her hand.

## 2021-05-20 ENCOUNTER — OFFICE VISIT (OUTPATIENT)
Dept: SURGERY | Facility: CLINIC | Age: 66
End: 2021-05-20
Payer: MEDICARE

## 2021-05-20 ENCOUNTER — APPOINTMENT (OUTPATIENT)
Dept: SURGERY | Facility: CLINIC | Age: 66
End: 2021-05-20

## 2021-05-20 DIAGNOSIS — M25.642 STIFFNESS OF JOINT, HAND, LEFT: Primary | ICD-10-CM

## 2021-05-20 DIAGNOSIS — S62.657A CLOSED NONDISPLACED FRACTURE OF MIDDLE PHALANX OF LEFT LITTLE FINGER, INITIAL ENCOUNTER: Primary | ICD-10-CM

## 2021-05-20 DIAGNOSIS — M62.81 DISTAL MUSCLE WEAKNESS: ICD-10-CM

## 2021-05-20 PROCEDURE — 99204 OFFICE O/P NEW MOD 45 MIN: CPT | Performed by: PLASTIC SURGERY

## 2021-05-20 PROCEDURE — 29130 APPL FINGER SPLINT STATIC: CPT | Performed by: OCCUPATIONAL THERAPIST

## 2021-05-20 RX ORDER — ATORVASTATIN CALCIUM 40 MG/1
TABLET, FILM COATED ORAL
COMMUNITY
Start: 2021-05-18

## 2021-05-20 NOTE — H&P
Nella Stephen is a 74year old female that presents with Patient presents with:  Pain: left small finger  .     REFERRED BY:  Grant Choi    Pacemaker: No  Latex Allergy: no  Coumadin: No  Plavix: No  Other anticoagulants: No  Cardiac stents: No    HA arthritis ([facility] [address]    • Type 1 diabetes mellitus ([facility] [address] 2001        SURGICAL  Past Surgical History:   Procedure Laterality Date   • BREAST BIOPSY Left 2004    fibroadenoma   • COLONOSCOPY  2007   • COLONOSCOPY N/A 6/26/2018    Procedure: COLONOSCOPY;  Surgeon 90 tablet 1   • cyclobenzaprine 5 MG Oral Tab Take 1 tablet (5 mg total) by mouth 3 (three) times daily as needed for Muscle spasms. 30 tablet 0   • cyclobenzaprine 5 MG Oral Tab Take 1 tablet (5 mg total) by mouth nightly.  30 tablet 0   • insulin detemir • Hypertension Sister           PHYSICAL EXAM:     CONSTITUTIONAL: Overall appearance - Normal  HEENT: Normocephalic  EYES: Conjunctiva - Right: Normal, Left: Normal; EOMI  EARS: Inspection - Right: Normal, Left: Normal  NECK/THYROID: Inspection - Normal MAKING    • PROBLEMS      MODERATE    (number / complexity)       o    Acute complicated injury intra-articular PIP fracture    • DATA         MODERATE    (amount / complexity)       o    X-ray reviewed    • MANAGEMENT RISK  LOW    (complications/ morbidit

## 2021-05-25 NOTE — PROGRESS NOTES
Subjective: I hurt my left small finger. Objective:     Current level of performance:  ADL: Independent with self care task needs.   Work: N/A  Leisure: Not addressed    Measurements/Tests:  ROM:         N/A         Treatment Provided this day: Chase Ceja

## 2021-05-26 ENCOUNTER — OFFICE VISIT (OUTPATIENT)
Dept: SURGERY | Facility: CLINIC | Age: 66
End: 2021-05-26
Payer: MEDICARE

## 2021-05-26 DIAGNOSIS — M25.641 JOINT STIFFNESS OF HAND, RIGHT: Primary | ICD-10-CM

## 2021-05-26 DIAGNOSIS — M62.81 DISTAL MUSCLE WEAKNESS: ICD-10-CM

## 2021-05-26 PROCEDURE — 97110 THERAPEUTIC EXERCISES: CPT | Performed by: OCCUPATIONAL THERAPIST

## 2021-05-26 PROCEDURE — 97166 OT EVAL MOD COMPLEX 45 MIN: CPT | Performed by: OCCUPATIONAL THERAPIST

## 2021-05-26 NOTE — PROGRESS NOTES
OCCUPATIONAL THERAPY EVALUATION:   Elizabeth James   [id number]       SUBJECTIVE:    HX of Injury: RSF PP ? Volar lip fracture. Chief Complaint:  RSF motion limitations. .    Precautions: None  Premorbid Functional Status: Independent w/ ADL's  Current Le Souleymane Elizalde MD

## 2021-05-28 RX ORDER — FENOFIBRATE 200 MG/1
200 CAPSULE ORAL
Qty: 90 CAPSULE | Refills: 4 | Status: SHIPPED | OUTPATIENT
Start: 2021-05-28 | End: 2021-05-31

## 2021-06-01 ENCOUNTER — OFFICE VISIT (OUTPATIENT)
Dept: SURGERY | Facility: CLINIC | Age: 66
End: 2021-06-01
Payer: MEDICARE

## 2021-06-01 DIAGNOSIS — M25.642 STIFFNESS OF JOINT, HAND, LEFT: Primary | ICD-10-CM

## 2021-06-01 DIAGNOSIS — M62.81 DISTAL MUSCLE WEAKNESS: ICD-10-CM

## 2021-06-01 PROCEDURE — 99070 SPECIAL SUPPLIES PHYS/QHP: CPT | Performed by: OCCUPATIONAL THERAPIST

## 2021-06-01 PROCEDURE — 97110 THERAPEUTIC EXERCISES: CPT | Performed by: OCCUPATIONAL THERAPIST

## 2021-06-01 PROCEDURE — 97022 WHIRLPOOL THERAPY: CPT | Performed by: OCCUPATIONAL THERAPIST

## 2021-06-01 NOTE — PROGRESS NOTES
Subjective: I am doing much better. Objective:     Current level of performance:  ADL: Independent  Work: Not applicable.   Leisure: Family    Measurements/Tests:  ROM:  Testing Suzanna Garcia MP Small Left: 0/90  PIP Small Left: 0/90  DIP Small Left:

## 2021-06-04 ENCOUNTER — TELEMEDICINE (OUTPATIENT)
Dept: FAMILY MEDICINE CLINIC | Facility: CLINIC | Age: 66
End: 2021-06-04

## 2021-06-04 DIAGNOSIS — K04.7 INFECTED TOOTH: ICD-10-CM

## 2021-06-04 DIAGNOSIS — E11.9 TYPE 2 DIABETES MELLITUS WITHOUT COMPLICATION, WITH LONG-TERM CURRENT USE OF INSULIN (HCC): ICD-10-CM

## 2021-06-04 DIAGNOSIS — Z98.818 S/P WISDOM TOOTH EXTRACTION: ICD-10-CM

## 2021-06-04 DIAGNOSIS — Z79.4 TYPE 2 DIABETES MELLITUS WITHOUT COMPLICATION, WITH LONG-TERM CURRENT USE OF INSULIN (HCC): ICD-10-CM

## 2021-06-04 DIAGNOSIS — R09.81 SINUS CONGESTION: Primary | ICD-10-CM

## 2021-06-04 PROCEDURE — 99212 OFFICE O/P EST SF 10 MIN: CPT | Performed by: NURSE PRACTITIONER

## 2021-06-04 NOTE — ASSESSMENT & PLAN NOTE
Ice to face  Follow up dental post op instructions  Complete antibiotics as prescribed  Please call if symptoms worsen or are not resolving.

## 2021-06-04 NOTE — ASSESSMENT & PLAN NOTE
Discussed high pollen counts  May try over the counter antihistamine and nasal steroid  Please call if symptoms worsen or are not resolving.

## 2021-06-04 NOTE — PROGRESS NOTES
HPI   Video Visit  Pressure in face and forehead, temp 99.7, chills and nausea yesterday. Has taken tylenol w some improvement. Had wisdom tooth pulled. 2 days ago-is on amoxicillin 500 mg tid. Sinus tenderness and sinuses started draining last night. Type 1 diabetes mellitus ([facility] [address] 2001       .   Past Surgical History:   Procedure Laterality Date   • Breast biopsy Left 2004    fibroadenoma   • Colonoscopy  2007   • Colonoscopy N/A 6/26/2018    Procedure: COLONOSCOPY;  Surgeon: Delma Angelucci, [place] Helmet: Not Asked        Seat Belt: Not Asked        Self-Exams: Not Asked        Left Handed: Not Asked        Right Handed: Yes        Currently spends a great deal of time in the sun: Yes        Past Sunlamp Treatments for Acne: Not Asked        History Subcutaneous Solution Pen-injector Inject 18 units under the skin three times a day before meals 20 pen 2   • Levocetirizine Dihydrochloride 5 MG Oral Tab Take 1 tablet (5 mg total) by mouth every evening.  90 tablet 0   • lisinopril 30 MG Oral Tab Take 1 t VOMITING    Comment:Abdominal pain  Hydrocodone             NAUSEA ONLY  Morphine                NAUSEA AND VOMITING    Comment:Abdominal pain    Physical Exam  Nursing note reviewed. Constitutional:       General: She is not in acute distress.      Appea

## 2021-06-05 ENCOUNTER — TELEPHONE (OUTPATIENT)
Dept: FAMILY MEDICINE CLINIC | Facility: CLINIC | Age: 66
End: 2021-06-05

## 2021-06-05 ENCOUNTER — HOSPITAL ENCOUNTER (OUTPATIENT)
Age: 66
Discharge: HOME | End: 2021-06-05
Payer: MEDICARE

## 2021-06-05 VITALS
BODY MASS INDEX: 40.64 KG/M2 | HEART RATE: 96 BPM | RESPIRATION RATE: 18 BRPM | DIASTOLIC BLOOD PRESSURE: 71 MMHG | WEIGHT: 207 LBS | SYSTOLIC BLOOD PRESSURE: 146 MMHG | HEIGHT: 60 IN | TEMPERATURE: 98 F | OXYGEN SATURATION: 100 %

## 2021-06-05 DIAGNOSIS — G44.89 OTHER HEADACHE SYNDROME: ICD-10-CM

## 2021-06-05 DIAGNOSIS — J02.0 STREPTOCOCCAL SORE THROAT: Primary | ICD-10-CM

## 2021-06-05 PROCEDURE — 99213 OFFICE O/P EST LOW 20 MIN: CPT | Performed by: EMERGENCY MEDICINE

## 2021-06-05 PROCEDURE — 87880 STREP A ASSAY W/OPTIC: CPT | Performed by: EMERGENCY MEDICINE

## 2021-06-05 RX ORDER — CEFDINIR 300 MG/1
300 CAPSULE ORAL 2 TIMES DAILY
Qty: 20 CAPSULE | Refills: 0 | Status: SHIPPED | OUTPATIENT
Start: 2021-06-05 | End: 2021-06-15

## 2021-06-05 NOTE — TELEPHONE ENCOUNTER
Patient had virtual appt yesterday with Audelia Poole regarding possible sinus infection and the fact she had a tooth extracted 3 days ago, and placed on Amoxicillin.     Patient just spoke with dentist regarding a \"white swollen area where tooth was pulled

## 2021-06-05 NOTE — ED PROVIDER NOTES
Patient Seen in: [facility]      History   Patient presents with: Body ache and/or chills    Stated Complaint: fever/chills/nausea/ha/puss around removed wisdom tooth    HPI/Subjective:    Maureen Jonas is a 74year old  female here for fe Exam  Vitals and nursing note reviewed. Constitutional:       Appearance: Normal appearance. She is not ill-appearing. HENT:      Head: Normocephalic. Nose: Congestion (mild) present.       Mouth/Throat:      Mouth: Mucous membranes are moist. Cap  Take 1 capsule (300 mg total) by mouth 2 (two) times daily for 10 days. , Normal, Disp-20 capsule, R-0      Medical Record Review/reassessment: I independently  reviewed available prior medical records for any recent pertinent discharge summaries/testi

## 2021-06-05 NOTE — ED INITIAL ASSESSMENT (HPI)
Had multiple dental procedures on Wednesday. Thursday was having a lot of pain, nausea, pus around tooth. +chills, body aches.

## 2021-06-12 RX ORDER — AMLODIPINE BESYLATE 5 MG/1
5 TABLET ORAL DAILY
Qty: 90 TABLET | Refills: 1 | Status: SHIPPED | OUTPATIENT
Start: 2021-06-12 | End: 2021-12-06

## 2021-06-14 RX ORDER — PANTOPRAZOLE SODIUM 40 MG/1
40 TABLET, DELAYED RELEASE ORAL DAILY
Qty: 90 TABLET | Refills: 1 | Status: SHIPPED | OUTPATIENT
Start: 2021-06-14 | End: 2021-12-06

## 2021-07-21 RX ORDER — GLIMEPIRIDE 4 MG/1
TABLET ORAL
Qty: 180 TABLET | Refills: 4 | Status: SHIPPED | OUTPATIENT
Start: 2021-07-21 | End: 2021-08-09

## 2021-08-10 ENCOUNTER — OFFICE VISIT (OUTPATIENT)
Dept: FAMILY MEDICINE CLINIC | Facility: CLINIC | Age: 66
End: 2021-08-10
Payer: MEDICARE

## 2021-08-10 ENCOUNTER — HOSPITAL ENCOUNTER (OUTPATIENT)
Dept: GENERAL RADIOLOGY | Age: 66
Discharge: HOME | End: 2021-08-10
Attending: FAMILY MEDICINE
Payer: MEDICARE

## 2021-08-10 VITALS
TEMPERATURE: 98 F | SYSTOLIC BLOOD PRESSURE: 130 MMHG | BODY MASS INDEX: 40.64 KG/M2 | DIASTOLIC BLOOD PRESSURE: 80 MMHG | WEIGHT: 207 LBS | HEART RATE: 71 BPM | HEIGHT: 60 IN

## 2021-08-10 DIAGNOSIS — M79.672 LEFT FOOT PAIN: ICD-10-CM

## 2021-08-10 DIAGNOSIS — Z79.4 TYPE 2 DIABETES MELLITUS WITHOUT COMPLICATION, WITH LONG-TERM CURRENT USE OF INSULIN (HCC): Primary | ICD-10-CM

## 2021-08-10 DIAGNOSIS — M25.561 ACUTE PAIN OF RIGHT KNEE: ICD-10-CM

## 2021-08-10 DIAGNOSIS — Z12.31 VISIT FOR SCREENING MAMMOGRAM: ICD-10-CM

## 2021-08-10 DIAGNOSIS — Z78.0 POST-MENOPAUSAL: ICD-10-CM

## 2021-08-10 DIAGNOSIS — E11.9 TYPE 2 DIABETES MELLITUS WITHOUT COMPLICATION, WITH LONG-TERM CURRENT USE OF INSULIN (HCC): Primary | ICD-10-CM

## 2021-08-10 DIAGNOSIS — I10 ESSENTIAL HYPERTENSION, BENIGN: ICD-10-CM

## 2021-08-10 PROCEDURE — 73562 X-RAY EXAM OF KNEE 3: CPT | Performed by: FAMILY MEDICINE

## 2021-08-10 PROCEDURE — 20610 DRAIN/INJ JOINT/BURSA W/O US: CPT | Performed by: FAMILY MEDICINE

## 2021-08-10 PROCEDURE — 99214 OFFICE O/P EST MOD 30 MIN: CPT | Performed by: FAMILY MEDICINE

## 2021-08-10 RX ORDER — METHYLPREDNISOLONE ACETATE 40 MG/ML
40 INJECTION, SUSPENSION INTRA-ARTICULAR; INTRALESIONAL; INTRAMUSCULAR; SOFT TISSUE ONCE
Status: COMPLETED | OUTPATIENT
Start: 2021-08-10 | End: 2021-08-10

## 2021-08-10 RX ADMIN — METHYLPREDNISOLONE ACETATE 40 MG: 40 INJECTION, SUSPENSION INTRA-ARTICULAR; INTRALESIONAL; INTRAMUSCULAR; SOFT TISSUE at 15:59:00

## 2021-08-10 NOTE — PROGRESS NOTES
Gonsalo  is a 74year old female. Patient presents with:  Knee Pain: right x 2 weeks    HPI:   Pt reports right knee pain for 2 weeks worsening. Reports some pain behind her knee also. Was concerned about a blood clot. No pain within her calf. MG Oral Tab, 1 TABLET 2 TIMES DAILY AS NEEDED, Disp: 30 tablet, Rfl: 5  FLUAD QUADRIVALENT 0.5 ML Intramuscular Prefilled Syringe, PHARMACY ADMINISTERED, Disp: , Rfl:   DICLOFENAC SODIUM 1 % Transdermal Gel, APPLY 2 GRAMS TO AFFECTED AREA 4 TIMES A DAY, Alexandria Lieberman breath, denies cough  CARDIOVASCULAR: denies chest pain  GI: denies abdominal pain and denies heartburn  NEURO: denies headaches  Musculoskeletal: pos joint pain, back pain    EXAM:   /80   Pulse 71   Temp 97.7 °F (36.5 °C) (Temporal)   Ht 5' (1.524

## 2021-08-11 ENCOUNTER — TELEPHONE (OUTPATIENT)
Dept: FAMILY MEDICINE CLINIC | Facility: CLINIC | Age: 66
End: 2021-08-11

## 2021-08-11 NOTE — TELEPHONE ENCOUNTER
Informed patient of advice per Dr. Ahsan Vyas regarding increasing insulins. Patient wanting to make sure Dr. Ahsan Vyas is aware that she is on a sliding scale with Novolog per Dr. Mercy Delong since May.   Patient asking if she should add an additional 2 units to this sl

## 2021-08-11 NOTE — TELEPHONE ENCOUNTER
Yes the steroid injection can increase her glucose. Can increase her long acting by 4 units tonight for 4 days and increase premeal by 2 units until glucose comes back down.

## 2021-08-11 NOTE — TELEPHONE ENCOUNTER
pt stated that she was in to see you yesterday and she received a steroid injection on her knee cap. Last night pt stated that her sugar was 420 and she was urinating a lot.  Pt stated that she did get home and had 2 inches of her Juan [place] beef,fries

## 2021-08-17 NOTE — TELEPHONE ENCOUNTER
Refilled per [facility], [facility] protocol.   Requested Prescriptions   Pending Prescriptions Disp Refills    Insulin Pen Needle 32G X 6 MM Does not apply Misc 400 each 2     Sig: USE 4 TIMES A DAY        Diabetic Supplies Protocol Passed - 8/17/2021  9:48 AM

## 2021-09-08 ENCOUNTER — OFFICE VISIT (OUTPATIENT)
Dept: ENDOCRINOLOGY CLINIC | Facility: CLINIC | Age: 66
End: 2021-09-08
Payer: MEDICARE

## 2021-09-08 VITALS
BODY MASS INDEX: 40 KG/M2 | DIASTOLIC BLOOD PRESSURE: 71 MMHG | SYSTOLIC BLOOD PRESSURE: 113 MMHG | HEART RATE: 68 BPM | WEIGHT: 205 LBS

## 2021-09-08 DIAGNOSIS — E11.65 UNCONTROLLED TYPE 2 DIABETES MELLITUS WITH HYPERGLYCEMIA (HCC): Primary | ICD-10-CM

## 2021-09-08 LAB
CARTRIDGE LOT#: ABNORMAL NUMERIC
HEMOGLOBIN A1C: 7.5 % (ref 4.3–5.6)

## 2021-09-08 PROCEDURE — 83036 HEMOGLOBIN GLYCOSYLATED A1C: CPT | Performed by: INTERNAL MEDICINE

## 2021-09-08 PROCEDURE — 99214 OFFICE O/P EST MOD 30 MIN: CPT | Performed by: INTERNAL MEDICINE

## 2021-09-08 PROCEDURE — 36416 COLLJ CAPILLARY BLOOD SPEC: CPT | Performed by: INTERNAL MEDICINE

## 2021-09-08 NOTE — PATIENT INSTRUCTIONS
STOP Metformin for 2 weeks to evaluate diarrhea     CONTINUE Glimepiride     Levemir 62 units subcutaneous daily    Novolog  INSULIN SLIDING SCALE  Base Values  Breakfast: 18  Lunch: 20  Dinner: 22  Ranges:  80-99: -2  100-119: 0  120-139: 0  140-159: 0  1

## 2021-09-08 NOTE — PROGRESS NOTES
Name: Ahsan Price  Date: 9/8/2021    Referring Physician: No ref. provider found    HISTORY OF PRESENT ILLNESS   Ahsan Price is a 74year old female who presents for diabetes mellitus, diagnosed in 2001.      Prior HbA, C or glycohemoglobin were 8 Disp: 300 strip, Rfl: 1  •  Meloxicam 15 MG Oral Tab, Take 1 tablet (15 mg total) by mouth daily. , Disp: 90 tablet, Rfl: 1  •  Insulin Aspart Pen (NOVOLOG FLEXPEN) 100 UNIT/ML Subcutaneous Solution Pen-injector, Inject 18 units under the skin three times a (VITAMIN B12) 1000 MCG Oral Tab, Take 1 tablet by mouth once daily. , Disp: 30 tablet, Rfl: 2  •  Ferrous Sulfate (KP FERROUS SULFATE) 325 (65 FE) MG Oral Tab, Take 1 tablet by mouth daily with breakfast.  , Disp: , Rfl:      Allergies:     Codeine N/A 6/26/2018    Procedure: COLONOSCOPY;  Surgeon: Josselin Marrufo MD;  Location: [facility] ENDOSCOPY   • EGD  06/26/2018   • ELBOW FRACTURE SURGERY Right 1998   • GLAUCOMA SURGERY Bilateral 2012   • KNEE SCOPE,MED/LAT MENISECTOMY Left 2004   • Kenji Sers

## 2021-09-13 RX ORDER — LISINOPRIL 30 MG/1
30 TABLET ORAL DAILY
Qty: 90 TABLET | Refills: 1 | Status: SHIPPED | OUTPATIENT
Start: 2021-09-13 | End: 2022-03-06

## 2021-09-22 ENCOUNTER — PATIENT MESSAGE (OUTPATIENT)
Dept: ENDOCRINOLOGY CLINIC | Facility: CLINIC | Age: 66
End: 2021-09-22

## 2021-09-22 NOTE — TELEPHONE ENCOUNTER
From: Martha Mcgraw  To: Beena Bowie MD  Sent: 9/22/2021 10:34 AM CDT  Subject: 2 week follow up    Hi Dr Arlene Medel. I’m following up per your request from my visit 2 weeks ago.  The diarrhea has been slightly better but I still have it at least once a day

## 2021-09-22 NOTE — TELEPHONE ENCOUNTER
Dr. Shreya Jerome    Please advise if ok to offer patient assistance program due to high cost of insulin

## 2021-09-22 NOTE — TELEPHONE ENCOUNTER
RN called patient and states she stopped metformin and glimepiride. States her glimepiride was discontinued already on 5/5/21. Her fasting BG would be between 180-215 and after meals right around 240's.   She has just been on novolog 18-20-22 + scale, lev

## 2021-09-23 ENCOUNTER — PATIENT MESSAGE (OUTPATIENT)
Dept: ENDOCRINOLOGY CLINIC | Facility: CLINIC | Age: 66
End: 2021-09-23

## 2021-09-24 NOTE — TELEPHONE ENCOUNTER
No Glimepiride - please add metformin as noted below. I don't think the message was given to the patient. Thanks.

## 2021-09-24 NOTE — TELEPHONE ENCOUNTER
From: Quique Ferrell  To: Loretta Friedman MD  Sent: 9/23/2021 3:36 PM CDT  Subject: Medication question    Dr. Jose Odonnell, since insulin is so expensive do I go back on the Metformin Once or twice a day to control the high blood sugars and also do I add the glim

## 2021-09-24 NOTE — TELEPHONE ENCOUNTER
Replied to patient's [employer] message with information from Dr. Fidel Goodrich below. Awaiting patient response.

## 2021-09-24 NOTE — TELEPHONE ENCOUNTER
Dr. Luis A Cruz,  Patient sent another [facility] - see below    Your response to the previous [facility]:  Lets add Metformin ER 500mg PO BID - #180, refill 1. Continue same novolog dose from visit.      Please advise regarding glimepiride -thanks

## 2021-09-24 NOTE — TELEPHONE ENCOUNTER
Dr. Dash Hobson,  Patient is agreeable to MTF - asking what her dose should be? Patient asking if dose of Novolog should change?     Please advise -thanks

## 2021-09-27 ENCOUNTER — OFFICE VISIT (OUTPATIENT)
Dept: ORTHOPEDICS CLINIC | Facility: CLINIC | Age: 66
End: 2021-09-27
Payer: MEDICARE

## 2021-09-27 DIAGNOSIS — M17.11 PRIMARY OSTEOARTHRITIS OF RIGHT KNEE: Primary | ICD-10-CM

## 2021-09-27 PROCEDURE — 99203 OFFICE O/P NEW LOW 30 MIN: CPT | Performed by: ORTHOPAEDIC SURGERY

## 2021-09-27 NOTE — H&P
NURSING INTAKE COMMENTS: Patient presents with:  Consult: Right knee pain for the last 3 months, denies pain at the moment, pt states she had a otrn meniscus years ago, xray in [facility]      HPI: This 74year old female presents today for right knee pain.   The mg total) by mouth daily. 90 tablet 1   • Insulin Pen Needle 32G X 6 MM Does not apply Misc USE 4 TIMES A  each 3   • Pantoprazole Sodium 40 MG Oral Tab EC Take 1 tablet (40 mg total) by mouth daily.  90 tablet 1   • amLODIPine Besylate 5 MG Oral Tab B12) 1000 MCG Oral Tab Take 1 tablet by mouth once daily.  30 tablet 2   • Ferrous Sulfate 325 (65 Fe) MG Oral Tab Take 1 tablet by mouth daily with breakfast.       • cyclobenzaprine 5 MG Oral Tab Take 1 tablet (5 mg total) by mouth 3 (three) times daily a difficulty urinating  MUSCULOSKELETAL: See HPI  NEURO: See HPI  PSYCHE: Denies depression or anxiety  HEMATOLOGIC: Denies hx of blood clots, or easy bleeding    Physical Examination:    There were no vitals taken for this visit.   General appearance: alert the disease. We discussed further conservative treatment options including weight loss, activity modification, assistive devices such as a cane and medications including NSAIDs and Tylenol.   We also discussed knee injections options including corticostero

## 2021-10-25 ENCOUNTER — HOSPITAL ENCOUNTER (OUTPATIENT)
Dept: MAMMOGRAPHY | Age: 66
Discharge: HOME | End: 2021-10-25
Attending: FAMILY MEDICINE
Payer: MEDICARE

## 2021-10-25 ENCOUNTER — HOSPITAL ENCOUNTER (OUTPATIENT)
Dept: BONE DENSITY | Age: 66
Discharge: HOME | End: 2021-10-25
Attending: FAMILY MEDICINE
Payer: MEDICARE

## 2021-10-25 DIAGNOSIS — Z78.0 POST-MENOPAUSAL: ICD-10-CM

## 2021-10-25 DIAGNOSIS — Z12.31 VISIT FOR SCREENING MAMMOGRAM: ICD-10-CM

## 2021-10-25 PROCEDURE — 77067 SCR MAMMO BI INCL CAD: CPT | Performed by: FAMILY MEDICINE

## 2021-10-25 PROCEDURE — 77063 BREAST TOMOSYNTHESIS BI: CPT | Performed by: FAMILY MEDICINE

## 2021-10-25 PROCEDURE — 77080 DXA BONE DENSITY AXIAL: CPT | Performed by: FAMILY MEDICINE

## 2021-10-26 RX ORDER — MELOXICAM 15 MG/1
15 TABLET ORAL DAILY
Qty: 90 TABLET | Refills: 1 | Status: SHIPPED | OUTPATIENT
Start: 2021-10-26

## 2021-10-26 NOTE — TELEPHONE ENCOUNTER
Please review; protocol failed/no protocol    Requested Prescriptions   Pending Prescriptions Disp Refills    MELOXICAM 15 MG Oral Tab [Pharmacy Med Name: MELOXICAM 15 MG TABLET] 90 tablet 1     Sig: TAKE 1 TABLET BY MOUTH EVERY DAY        Non-Narcotic Cande Fontanez

## 2021-11-02 ENCOUNTER — NURSE TRIAGE (OUTPATIENT)
Dept: FAMILY MEDICINE CLINIC | Facility: CLINIC | Age: 66
End: 2021-11-02

## 2021-11-02 RX ORDER — NITROFURANTOIN 25; 75 MG/1; MG/1
100 CAPSULE ORAL 2 TIMES DAILY
Qty: 14 CAPSULE | Refills: 0 | Status: SHIPPED | OUTPATIENT
Start: 2021-11-02 | End: 2021-12-22

## 2021-11-02 NOTE — TELEPHONE ENCOUNTER
Action Requested: Summary for Provider     []  Critical Lab, Recommendations Needed  [] Need Additional Advice  []   FYI    []   Need Orders  [] Need Medications Sent to Pharmacy  []  Other     SUMMARY:    Please advise.    The patient stated can only seen

## 2021-11-02 NOTE — TELEPHONE ENCOUNTER
I sent script for macrobid BID 7 days. If not improving, needs to be seen in office    Pt to schedule her medicare physical before end of the year. [id number] [address] for res 24.

## 2021-11-02 NOTE — TELEPHONE ENCOUNTER
Advised patient of Dr. Schuyler Valero note. Patient verbalized understanding.   Warm transferred to phone room to schedule medicare physical.

## 2021-11-05 RX ORDER — METFORMIN HYDROCHLORIDE 500 MG/1
TABLET, EXTENDED RELEASE ORAL
Qty: 180 TABLET | Refills: 2 | Status: SHIPPED | OUTPATIENT
Start: 2021-11-05

## 2021-11-05 NOTE — TELEPHONE ENCOUNTER
Refill passed per [facility], [facility] protocol.     Requested Prescriptions   Pending Prescriptions Disp Refills    METFORMIN HCL  MG Oral Tablet 24 Hr [Pharmacy Med Name: METFORMIN HCL  MG TABLET] 180 tablet 2     Sig: TAKE 1 TABLET BY MOUTH TWICE

## 2021-11-18 ENCOUNTER — PATIENT MESSAGE (OUTPATIENT)
Dept: ENDOCRINOLOGY CLINIC | Facility: CLINIC | Age: 66
End: 2021-11-18

## 2021-11-19 NOTE — TELEPHONE ENCOUNTER
Located request for chart notes from [facility] Completed and faxed. Sent MyChart to update patient on status.

## 2021-11-23 ENCOUNTER — PATIENT OUTREACH (OUTPATIENT)
Dept: CASE MANAGEMENT | Age: 66
End: 2021-11-23

## 2021-12-06 RX ORDER — AMLODIPINE BESYLATE 5 MG/1
TABLET ORAL
Qty: 90 TABLET | Refills: 1 | Status: SHIPPED | OUTPATIENT
Start: 2021-12-06

## 2021-12-06 RX ORDER — PANTOPRAZOLE SODIUM 40 MG/1
TABLET, DELAYED RELEASE ORAL
Qty: 90 TABLET | Refills: 1 | Status: SHIPPED | OUTPATIENT
Start: 2021-12-06

## 2021-12-22 ENCOUNTER — OFFICE VISIT (OUTPATIENT)
Dept: FAMILY MEDICINE CLINIC | Facility: CLINIC | Age: 66
End: 2021-12-22
Payer: MEDICARE

## 2021-12-22 ENCOUNTER — LAB ENCOUNTER (OUTPATIENT)
Dept: LAB | Age: 66
End: 2021-12-22
Attending: FAMILY MEDICINE
Payer: MEDICARE

## 2021-12-22 VITALS
HEART RATE: 77 BPM | BODY MASS INDEX: 40.72 KG/M2 | HEIGHT: 60 IN | WEIGHT: 207.38 LBS | DIASTOLIC BLOOD PRESSURE: 70 MMHG | SYSTOLIC BLOOD PRESSURE: 120 MMHG | TEMPERATURE: 97 F

## 2021-12-22 DIAGNOSIS — E11.3291 MILD NONPROLIFERATIVE DIABETIC RETINOPATHY OF RIGHT EYE WITHOUT MACULAR EDEMA ASSOCIATED WITH TYPE 2 DIABETES MELLITUS (HCC): ICD-10-CM

## 2021-12-22 DIAGNOSIS — E11.9 TYPE 2 DIABETES MELLITUS WITHOUT COMPLICATION, WITH LONG-TERM CURRENT USE OF INSULIN (HCC): ICD-10-CM

## 2021-12-22 DIAGNOSIS — I10 ESSENTIAL HYPERTENSION, BENIGN: ICD-10-CM

## 2021-12-22 DIAGNOSIS — E55.9 VITAMIN D DEFICIENCY: ICD-10-CM

## 2021-12-22 DIAGNOSIS — Z00.00 ENCOUNTER FOR ANNUAL HEALTH EXAMINATION: ICD-10-CM

## 2021-12-22 DIAGNOSIS — E78.2 MIXED HYPERLIPIDEMIA: Primary | ICD-10-CM

## 2021-12-22 DIAGNOSIS — R01.1 CARDIAC MURMUR: ICD-10-CM

## 2021-12-22 DIAGNOSIS — H04.123 DRY EYE SYNDROME OF BILATERAL LACRIMAL GLANDS: ICD-10-CM

## 2021-12-22 DIAGNOSIS — Z79.4 TYPE 2 DIABETES MELLITUS WITHOUT COMPLICATION, WITH LONG-TERM CURRENT USE OF INSULIN (HCC): ICD-10-CM

## 2021-12-22 PROBLEM — R09.81 SINUS CONGESTION: Status: RESOLVED | Noted: 2021-06-04 | Resolved: 2021-12-22

## 2021-12-22 PROBLEM — M25.512 ACUTE PAIN OF LEFT SHOULDER: Status: RESOLVED | Noted: 2019-09-10 | Resolved: 2021-12-22

## 2021-12-22 PROBLEM — H40.033 ANATOMICAL NARROW ANGLE OF BOTH EYES: Status: RESOLVED | Noted: 2017-10-02 | Resolved: 2021-12-22

## 2021-12-22 PROBLEM — Z98.818 S/P WISDOM TOOTH EXTRACTION: Status: RESOLVED | Noted: 2021-06-04 | Resolved: 2021-12-22

## 2021-12-22 PROBLEM — M54.2 NECK PAIN: Status: RESOLVED | Noted: 2019-09-10 | Resolved: 2021-12-22

## 2021-12-22 PROBLEM — H26.9 INCIPIENT CATARACT OF LEFT EYE: Status: RESOLVED | Noted: 2017-10-02 | Resolved: 2021-12-22

## 2021-12-22 PROBLEM — H10.13 ALLERGIC CONJUNCTIVITIS OF BOTH EYES: Status: RESOLVED | Noted: 2017-10-02 | Resolved: 2021-12-22

## 2021-12-22 PROBLEM — K04.7 INFECTED TOOTH: Status: RESOLVED | Noted: 2021-06-04 | Resolved: 2021-12-22

## 2021-12-22 PROCEDURE — 82607 VITAMIN B-12: CPT

## 2021-12-22 PROCEDURE — 82306 VITAMIN D 25 HYDROXY: CPT

## 2021-12-22 PROCEDURE — 84443 ASSAY THYROID STIM HORMONE: CPT

## 2021-12-22 PROCEDURE — 82570 ASSAY OF URINE CREATININE: CPT

## 2021-12-22 PROCEDURE — 36415 COLL VENOUS BLD VENIPUNCTURE: CPT

## 2021-12-22 PROCEDURE — 83036 HEMOGLOBIN GLYCOSYLATED A1C: CPT

## 2021-12-22 PROCEDURE — 82043 UR ALBUMIN QUANTITATIVE: CPT

## 2021-12-22 PROCEDURE — G0439 PPPS, SUBSEQ VISIT: HCPCS | Performed by: FAMILY MEDICINE

## 2021-12-22 PROCEDURE — 80053 COMPREHEN METABOLIC PANEL: CPT

## 2021-12-22 PROCEDURE — 85025 COMPLETE CBC W/AUTO DIFF WBC: CPT

## 2021-12-22 PROCEDURE — 80061 LIPID PANEL: CPT

## 2021-12-22 RX ORDER — SEMAGLUTIDE 1.34 MG/ML
0.25 INJECTION, SOLUTION SUBCUTANEOUS
Qty: 3 ML | Refills: 2 | Status: SHIPPED | OUTPATIENT
Start: 2021-12-22 | End: 2022-02-07

## 2021-12-22 RX ORDER — PEN NEEDLE, DIABETIC 30 GX3/16"
1 NEEDLE, DISPOSABLE MISCELLANEOUS
Qty: 30 EACH | Refills: 0 | Status: SHIPPED | OUTPATIENT
Start: 2021-12-22

## 2021-12-22 NOTE — PROGRESS NOTES
HPI:   Olivia Lowry is a 74year old female who presents for a Medicare Subsequent Annual Wellness visit (Pt already had Initial Annual Wellness). Reports issues with metformin - causing diarrhea.   Not sure if every tried ozempic or other injectab explanation and discussion of advance directives standard forms performed Face to Face with patient and Family/surrogate (if present), and forms available to patient in AVS       She has never smoked tobacco.    CAGE screening score of 0 on 12/20/2021, david 4 TIMES A DAY  fenofibrate micronized 200 MG Oral Cap, Take 1 capsule (200 mg total) by mouth every morning before breakfast.  atorvastatin 40 MG Oral Tab,   Glucose Blood (ACCU-CHEK ELAINE PLUS) In Vitro Strip, 1 strip by In Vitro route 3 (three) times shreyas history that includes colonoscopy (2007); glaucoma surgery (Bilateral, 2012); Yag Iridectomy - OU - Both Eyes; removal of ovary/tube(s) (Bilateral, 11/04/2017);  Breast biopsy (Left, 2004); vaginal hysterectomy (1984); laparoscopic cholecystectomy (1993); k Screening    Time taken: 12/22/2021 10:01 AM  Screening Method: Questionnaire  I have a problem hearing over the telephone: No I have trouble following the conversations when two or more people are talking at the same time: No   I have trouble understandin unlabored   Heart:  Regular rate and rhythm, S1 and S2 normal, no murmur, rub, or gallop   Abdomen:   Soft, non-tender, bowel sounds active all four quadrants,  no masses, no organomegaly   Pelvic: Deferred   Extremities: Extremities normal, atraumatic, no A1C; Future  - MICROALB/CREAT RATIO, RANDOM URINE; Future  - VITAMIN B12; Future    5. 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 Treatment per ophth    6.  Dry eye s Date    NLTVYBD 653 01/30/2021    HDL 49 01/30/2021    LDL 74 01/30/2021    TRIG 168 (H) 01/30/2021         Electrocardiogram (EKG)   Covered if needed at Welcome to Medicare, and non-screening if indicated for medical reasons 10/26/2017      Ultrasound Sc Tetanus Toxoid Not covered by Medicare Part B unless medically necessary (cut with metal); may be covered with your pharmacy prescription benefits -    Tetanus, Diptheria and Pertusis TD and TDaP Not covered by Medicare Part B -  No recommendations at t

## 2021-12-22 NOTE — PATIENT INSTRUCTIONS
Angy Partida's SCREENING SCHEDULE   Tests on this list are recommended by your physician but may not be covered, or covered at this frequency, by your insurer. Please check with your insurance carrier before scheduling to verify coverage.    PREVENT BONE DENSITOMETRY (CPT=77080) 10/25/2021      No recommendations at this time   Pap and Pelvic    Pap   Covered every 2 years for women at normal risk;  Annually if at high risk -  No recommendations at this time    Chlamydia Annually if high risk -  No rec Creatinine   Annually Lab Results   Component Value Date    CREATSERUM 0.91 01/30/2021         BUN Annually Lab Results   Component Value Date    BUN 20 (H) 01/30/2021       Drug Serum Conc Annually No results found for: DIGOXIN, DIG, VALP              Rec

## 2021-12-29 NOTE — PROGRESS NOTES
Ismael Travis - Your cholesterol is well controlled with your current medication - statin. Continue the same. Your diabetes is not well controlled right now. I see you have an appointment with Dr. Fidel Goodrich coming up. She will review the results with you.  Make sure

## 2022-01-19 ENCOUNTER — LAB ENCOUNTER (OUTPATIENT)
Dept: LAB | Age: 67
End: 2022-01-19
Attending: FAMILY MEDICINE
Payer: MEDICARE

## 2022-01-19 DIAGNOSIS — E83.52 SERUM CALCIUM ELEVATED: ICD-10-CM

## 2022-01-19 LAB — PTH-INTACT SERPL-MCNC: 12.3 PG/ML (ref 18.5–88)

## 2022-01-19 PROCEDURE — 83970 ASSAY OF PARATHORMONE: CPT

## 2022-01-19 PROCEDURE — 36415 COLL VENOUS BLD VENIPUNCTURE: CPT

## 2022-01-23 NOTE — PROGRESS NOTES
Hi Dr. Desean Tristan is seeing you this week for diabetes follow up. Her calcium is elevated with low PTH.

## 2022-01-26 ENCOUNTER — LAB ENCOUNTER (OUTPATIENT)
Dept: LAB | Age: 67
End: 2022-01-26
Attending: FAMILY MEDICINE
Payer: MEDICARE

## 2022-01-26 ENCOUNTER — OFFICE VISIT (OUTPATIENT)
Dept: ENDOCRINOLOGY CLINIC | Facility: CLINIC | Age: 67
End: 2022-01-26
Payer: MEDICARE

## 2022-01-26 VITALS
WEIGHT: 210 LBS | HEART RATE: 73 BPM | BODY MASS INDEX: 41 KG/M2 | DIASTOLIC BLOOD PRESSURE: 77 MMHG | SYSTOLIC BLOOD PRESSURE: 129 MMHG

## 2022-01-26 DIAGNOSIS — E11.65 UNCONTROLLED TYPE 2 DIABETES MELLITUS WITH HYPERGLYCEMIA (HCC): Primary | ICD-10-CM

## 2022-01-26 DIAGNOSIS — E83.52 HYPERCALCEMIA: ICD-10-CM

## 2022-01-26 LAB
GLUCOSE BLOOD: 160
PROT UR-MCNC: 9.3 MG/DL
PTH-INTACT SERPL-MCNC: 15.7 PG/ML (ref 18.5–88)
TEST STRIP LOT #: NORMAL NUMERIC
VIT D+METAB SERPL-MCNC: 22.2 NG/ML (ref 30–100)

## 2022-01-26 PROCEDURE — 84165 PROTEIN E-PHORESIS SERUM: CPT

## 2022-01-26 PROCEDURE — 84166 PROTEIN E-PHORESIS/URINE/CSF: CPT

## 2022-01-26 PROCEDURE — 82947 ASSAY GLUCOSE BLOOD QUANT: CPT | Performed by: INTERNAL MEDICINE

## 2022-01-26 PROCEDURE — 83521 IG LIGHT CHAINS FREE EACH: CPT

## 2022-01-26 PROCEDURE — 82652 VIT D 1 25-DIHYDROXY: CPT

## 2022-01-26 PROCEDURE — 86334 IMMUNOFIX E-PHORESIS SERUM: CPT

## 2022-01-26 PROCEDURE — 36416 COLLJ CAPILLARY BLOOD SPEC: CPT | Performed by: INTERNAL MEDICINE

## 2022-01-26 PROCEDURE — 82542 COL CHROMOTOGRAPHY QUAL/QUAN: CPT

## 2022-01-26 PROCEDURE — 83970 ASSAY OF PARATHORMONE: CPT

## 2022-01-26 PROCEDURE — 36415 COLL VENOUS BLD VENIPUNCTURE: CPT

## 2022-01-26 PROCEDURE — 99214 OFFICE O/P EST MOD 30 MIN: CPT | Performed by: INTERNAL MEDICINE

## 2022-01-26 PROCEDURE — 86335 IMMUNFIX E-PHORSIS/URINE/CSF: CPT

## 2022-01-26 PROCEDURE — 82306 VITAMIN D 25 HYDROXY: CPT

## 2022-01-26 NOTE — PATIENT INSTRUCTIONS
DECREASE Levemir to 54 units subcutaneous daily    CONTINUE Novolog 18-20-22 units with meals    CONTINUE Metformin    CONTINUE Ozempic 0.25mg subcutaneous weekly

## 2022-01-26 NOTE — PROGRESS NOTES
Name: Eusebio Magana  Date: 1/26/2022    Referring Physician: No ref. provider found    HISTORY OF PRESENT ILLNESS   Eusebio Magana is a 74year old female who presents for diabetes mellitus, diagnosed in 2001.      Prior HbA, C or glycohemoglobin were 1  •  Insulin Pen Needle 32G X 6 MM Does not apply Misc, USE 4 TIMES A DAY, Disp: 400 each, Rfl: 3  •  fenofibrate micronized 200 MG Oral Cap, Take 1 capsule (200 mg total) by mouth every morning before breakfast., Disp: 90 capsule, Rfl: 4  •  atorvastatin MCG Oral Tab, Take 1 tablet by mouth once daily. , Disp: 30 tablet, Rfl: 2  •  Ferrous Sulfate 325 (65 Fe) MG Oral Tab, Take 1 tablet by mouth daily with breakfast.  , Disp: , Rfl:      Allergies:     Codeine                 NAUSEA AND VOMITING    Comment:A SURGERY Right 1998   • GLAUCOMA SURGERY Bilateral 2012   • KNEE SCOPE,MED/LAT MENISECTOMY Left 2004   • LAPAROSCOPIC CHOLECYSTECTOMY  1993   • JULIO LOCALIZATION WIRE 1 SITE LEFT (CPT=19281)  2004   • REMOVAL OF OVARY/TUBE(S) Bilateral 11/04/2017    Laparosc Continue Vitamin D 2000 units daily  - No calcium supplementation or dairy intake   - Check PTHrP, Vitamin D1,25, SPEP, UPEP  - Further management based on above results     RTC 4 months     1/26/2022  Corrie Reed MD

## 2022-01-28 LAB
1,25-DIHYDROXYVITAMIN D: 70.3 PG/ML
ALBUMIN SERPL ELPH-MCNC: 3.95 G/DL (ref 3.75–5.21)
ALBUMIN/GLOB SERPL: 1.44 {RATIO} (ref 1–2)
ALPHA1 GLOB SERPL ELPH-MCNC: 0.28 G/DL (ref 0.19–0.46)
ALPHA2 GLOB SERPL ELPH-MCNC: 0.66 G/DL (ref 0.48–1.05)
B-GLOBULIN SERPL ELPH-MCNC: 0.95 G/DL (ref 0.68–1.23)
GAMMA GLOB SERPL ELPH-MCNC: 0.86 G/DL (ref 0.62–1.7)
KAPPA LC FREE SER-MCNC: 2.03 MG/DL (ref 0.33–1.94)
KAPPA LC FREE/LAMBDA FREE SER NEPH: 1.02 {RATIO} (ref 0.26–1.65)
LAMBDA LC FREE SERPL-MCNC: 1.99 MG/DL (ref 0.57–2.63)
PROT SERPL-MCNC: 6.7 G/DL (ref 6.4–8.2)

## 2022-02-01 ENCOUNTER — PATIENT MESSAGE (OUTPATIENT)
Dept: FAMILY MEDICINE CLINIC | Facility: CLINIC | Age: 67
End: 2022-02-01

## 2022-02-03 RX ORDER — ONDANSETRON 4 MG/1
4 TABLET, FILM COATED ORAL EVERY 8 HOURS PRN
Qty: 20 TABLET | Refills: 1 | Status: SHIPPED | OUTPATIENT
Start: 2022-02-03

## 2022-02-06 LAB — PTH RELATED PEPTIDE: <2 PMOL/L

## 2022-02-07 ENCOUNTER — OFFICE VISIT (OUTPATIENT)
Dept: FAMILY MEDICINE CLINIC | Facility: CLINIC | Age: 67
End: 2022-02-07
Payer: MEDICARE

## 2022-02-07 VITALS
SYSTOLIC BLOOD PRESSURE: 124 MMHG | HEIGHT: 60 IN | WEIGHT: 210.81 LBS | DIASTOLIC BLOOD PRESSURE: 69 MMHG | BODY MASS INDEX: 41.39 KG/M2 | HEART RATE: 77 BPM | TEMPERATURE: 97 F

## 2022-02-07 DIAGNOSIS — Z79.4 TYPE 2 DIABETES MELLITUS WITHOUT COMPLICATION, WITH LONG-TERM CURRENT USE OF INSULIN (HCC): Primary | ICD-10-CM

## 2022-02-07 DIAGNOSIS — I10 ESSENTIAL HYPERTENSION, BENIGN: ICD-10-CM

## 2022-02-07 DIAGNOSIS — E11.9 TYPE 2 DIABETES MELLITUS WITHOUT COMPLICATION, WITH LONG-TERM CURRENT USE OF INSULIN (HCC): Primary | ICD-10-CM

## 2022-02-07 PROCEDURE — 99214 OFFICE O/P EST MOD 30 MIN: CPT | Performed by: FAMILY MEDICINE

## 2022-02-07 RX ORDER — SEMAGLUTIDE 1.34 MG/ML
0.5 INJECTION, SOLUTION SUBCUTANEOUS
Qty: 4 ML | Refills: 4 | Status: SHIPPED | OUTPATIENT
Start: 2022-02-07 | End: 2022-03-07

## 2022-02-10 RX ORDER — BLOOD SUGAR DIAGNOSTIC
STRIP MISCELLANEOUS
Qty: 300 STRIP | Refills: 3 | Status: SHIPPED | OUTPATIENT
Start: 2022-02-10

## 2022-02-10 NOTE — TELEPHONE ENCOUNTER
Refill passed per [facility] protocol. Requested Prescriptions   Pending Prescriptions Disp Refills    ACCU-CHEK ELAINE PLUS In Vitro Strip [Pharmacy Med Name: ACCU-CHEK ELAINE PLUS TEST STRP] 300 strip 1     Sig: USE 1 STRIP AS DIRECTED 3 TIMES DAILY.         Diabetic Supplies Protocol Passed - 2/10/2022  1:30 AM        Passed - Appointment in past 12 or next 3 months              Recent Outpatient Visits              3 days ago Type 2 diabetes mellitus without complication, with long-term current use of insulin [facility])    [facility], Lanie Medina, Keely Jordan MD    Office Visit    2 weeks ago Uncontrolled type 2 diabetes mellitus with hyperglycemia [facility])    [facility], Lanie Medina, Sahil Hooper MD    Office Visit    1 month ago Mixed hyperlipidemia    Poynor Gave, Keely Jordan MD    Office Visit    4 months ago Primary osteoarthritis of right knee    [facility]    Office Visit    5 months ago Uncontrolled type 2 diabetes mellitus with hyperglycemia [facility])    [facility], Lanie Medina, Sahil Hooper MD    Office Visit            Future Appointments         Provider Department Appt Notes    In 3 months Ishmael Salas MD [facility], Gee Lynch follow up    In 3 months [place] MD Ophthalmology - [address] [place] [address] : Return in about 1 year (around 5/14/2022) for complete exam.    In 5 months Thao Jerome MD [facility], Gee Lynch 6 Month F/u

## 2022-03-06 RX ORDER — LISINOPRIL 30 MG/1
30 TABLET ORAL DAILY
Qty: 90 TABLET | Refills: 1 | Status: SHIPPED | OUTPATIENT
Start: 2022-03-06 | End: 2022-09-02

## 2022-03-06 NOTE — TELEPHONE ENCOUNTER
Refill passed per [facility] protocol.     Requested Prescriptions   Pending Prescriptions Disp Refills    LISINOPRIL 30 MG Oral Tab [Pharmacy Med Name: LISINOPRIL 30 MG TABLET] 90 tablet 1     Sig: TAKE 1 TABLET BY MOUTH EVERY DAY        Hypertensive Medications Protocol Passed - 3/6/2022  7:39 AM        Passed - CMP or BMP in past 12 months        Passed - Appointment in past 6 or next 3 months        Passed - GFR Non- > 50     Lab Results   Component Value Date    GFRNAA 67 12/22/2021                     Recent Outpatient Visits              3 weeks ago Type 2 diabetes mellitus without complication, with long-term current use of insulin [facility])    [facility], Denver Lynch MD    Office Visit    1 month ago Uncontrolled type 2 diabetes mellitus with hyperglycemia [facility])    [facility], Lanie Medina, Christy Cantu MD    Office Visit    2 months ago Mixed hyperlipidemia    Denver Turner MD    Office Visit    5 months ago Primary osteoarthritis of right knee    [facility] for Ronaldo Kaur MD    Office Visit    5 months ago Uncontrolled type 2 diabetes mellitus with hyperglycemia [facility])    [facility], Lanie Medina, Christy Cantu MD    Office Visit          Future Appointments         Provider Department Appt Notes    In 2 months Alberto Schmitt MD [facility], Gee Lynch follow up    In 2 months Nishant Mchugh MD Ophthalmology - [facility] : Return in about 1 year (around 5/14/2022) for complete exam.    In 5 months Zaida Shetty MD [facility], Gee Lynch 6 Month F/u

## 2022-03-07 RX ORDER — LANCETS
EACH MISCELLANEOUS
Qty: 300 EACH | Refills: 3 | Status: SHIPPED | OUTPATIENT
Start: 2022-03-07

## 2022-03-07 NOTE — TELEPHONE ENCOUNTER
----- Message from Sherrill Brunner sent at 3/6/2022 12:06 PM CST -----  Regarding: Latrice Vasquez softclix lancets  Dr. Joe Smith, would it be possible for you to send a prescription for the Accu check softclix lancets to the pharmacy [facility] in [facility] in [place]? I would appreciate this very much thank you.  Jami Vincent

## 2022-03-07 NOTE — TELEPHONE ENCOUNTER
RX requested not on Med hx   Refill passed per [facility] protocol.       Requested Prescriptions   Pending Prescriptions Disp Refills    Accu-Chek Softclix Lancets Does not apply Misc 300 each 3     Sig: Check blood glucose 3 times daily        Diabetic Supplies Protocol Passed - 3/7/2022  7:20 AM        Passed - Appointment in past 12 or next 3 months               Future Appointments         Provider Department Appt Notes    In 2 months Jt Saucedo MD [facility], Gee Lynch follow up    In 2 months Rochelle Perkins MD Ophthalmology - [address] [place] [address] : Return in about 1 year (around 5/14/2022) for complete exam.    In 5 months Arpit Cardenas MD [facility], Gee Lynch 6 Month F/u            Recent Outpatient Visits              4 weeks ago Type 2 diabetes mellitus without complication, with long-term current use of insulin [facility])    [address] MD    Office Visit    1 month ago Uncontrolled type 2 diabetes mellitus with hyperglycemia [facility])    [facility], Lanie Medina, Chaim Perry MD    Office Visit    2 months ago Mixed hyperlipidemia    [address] MD    Office Visit    5 months ago Primary osteoarthritis of right knee    [facility] for Nuris Newton MD    Office Visit    6 months ago Uncontrolled type 2 diabetes mellitus with hyperglycemia [facility])    [place] [employer], Chaim Lynch MD    Office Visit

## 2022-03-08 ENCOUNTER — TELEPHONE (OUTPATIENT)
Dept: ENDOCRINOLOGY CLINIC | Facility: CLINIC | Age: 67
End: 2022-03-08

## 2022-03-08 NOTE — TELEPHONE ENCOUNTER
Received fax from [facility]. They are requesting most recent chart notes. Completed and faxed back.

## 2022-03-11 ENCOUNTER — PATIENT MESSAGE (OUTPATIENT)
Dept: ENDOCRINOLOGY CLINIC | Facility: CLINIC | Age: 67
End: 2022-03-11

## 2022-03-15 NOTE — TELEPHONE ENCOUNTER
From: Roseline Langston  To: Ally Beltran MD  Sent: 3/11/2022 11:49 AM CST  Subject: Need documentation     Dr. Emiliano Alejo, Medicare is awaiting documentation in order to release my order of sensors. If you could please see that they receive this information as I am out of sensors. Thank you very much.  Beatris Garcia

## 2022-03-26 RX ORDER — LEVOCETIRIZINE DIHYDROCHLORIDE 5 MG/1
5 TABLET, FILM COATED ORAL EVERY EVENING
Qty: 90 TABLET | Refills: 1 | Status: SHIPPED | OUTPATIENT
Start: 2022-03-26

## 2022-03-26 NOTE — TELEPHONE ENCOUNTER
Refill passed per [address] protocol. Requested Prescriptions   Pending Prescriptions Disp Refills    levocetirizine 5 MG Oral Tab 90 tablet 0     Sig: Take 1 tablet (5 mg total) by mouth every evening.         Allergy Medication Protocol Passed - 3/25/2022  7:20 PM        Passed - Appoinment in past 12 or next 3 months            Recent Outpatient Visits              1 month ago Type 2 diabetes mellitus without complication, with long-term current use of insulin [facility])    [address], [address], Sophia Sanz MD    Office Visit    1 month ago Uncontrolled type 2 diabetes mellitus with hyperglycemia [facility])    Lanie Robledo, Ashely Oliver MD    Office Visit    3 months ago Mixed hyperlipidemia    [place] Núñez, Sophia Sanz MD    Office Visit    6 months ago Primary osteoarthritis of right knee    [facility] Lyubov [facility]    Office Visit    6 months ago Uncontrolled type 2 diabetes mellitus with hyperglycemia [facility])    [address], Ashely Oliver MD    Office Visit          Future Appointments         Provider Department Appt Notes    In 2 months MD Jayy Hansen [address], [place] follow up    In 2 months  [facility] [address] [place] [address] : Return in about 1 year (around 5/14/2022) for complete exam.    In 4 months MD Jayy Xiao [address], [place] 6 Month F/u

## 2022-04-08 ENCOUNTER — TELEPHONE (OUTPATIENT)
Dept: ENDOCRINOLOGY CLINIC | Facility: CLINIC | Age: 67
End: 2022-04-08

## 2022-04-08 NOTE — TELEPHONE ENCOUNTER
Received fax from [employer]. They are requesting MD signs the attached renewal Rx form. Placed on MD desk for signature.

## 2022-04-23 NOTE — TELEPHONE ENCOUNTER
Please review refill failed/no protocol - NSAID does not fall under protocol.      Requested Prescriptions     Pending Prescriptions Disp Refills    MELOXICAM 15 MG Oral Tab [Pharmacy Med Name: MELOXICAM 15 MG TABLET] 90 tablet 1     Sig: TAKE 1 TABLET BY MOUTH EVERY DAY         Recent Visits  Date Type Provider Dept   02/07/22 Office Visit MD [facility]   12/22/21 Office Visit MD [facility]   [facility]   Showing recent visits within past 540 days with a meds authorizing provider and meeting all other requirements  Future Appointments  Date Type Provider Dept   08[facility]08/22 [facility]   Showing future appointments within next 150 days with a meds authorizing provider and meeting all other requirements    Requested Prescriptions   Pending Prescriptions Disp Refills    MELOXICAM 15 MG Oral Tab [Pharmacy Med Name: MELOXICAM 15 MG TABLET] 90 tablet 1     Sig: TAKE 1 TABLET BY MOUTH EVERY DAY        Non-Narcotic Pain Medication Protocol Passed - 4/23/2022  8:07 AM        Passed - Appointment in past 6 or next 3 months            Future Appointments         Provider Department Appt Notes    In 1 month [facility], [address] follow up    In 3 months August[facility] Spears [facility], L[place] Medina, Gee 6 Month F/u          Recent Outpatient Visits              2 months ago Type 2 diabetes mellitus without complication, with long-term current use of insulin [facility])    Rg Reynaga MD    Office Visit    2 months ago Uncontrolled type 2 diabetes mellitus with hyperglycemia [facility])    [place] [facility], [address], Teresita Bojorquez MD    Office Visit    4 months ago Mixed hyperlipidemia    [address] MD    Office Visit    6 months ago Primary osteoarthritis of right knee [facility] for Lisa Uribe MD    Office Visit    7 months ago Uncontrolled type 2 diabetes mellitus with hyperglycemia [facility])    Juan Palacios, Natasha Gimenez MD    Office Visit

## 2022-04-25 RX ORDER — MELOXICAM 15 MG/1
15 TABLET ORAL DAILY
Qty: 90 TABLET | Refills: 1 | Status: SHIPPED | OUTPATIENT
Start: 2022-04-25 | End: 2022-10-21

## 2022-05-11 NOTE — TELEPHONE ENCOUNTER
Virtual Telephone Check-In    Noris Moreno verbally consents to a Virtual/Telephone Check-In visit on 04/18/20. Patient understands and accepts financial responsibility for any deductible, co-insurance and/or co-pays associated with this service. Oral Tab, TAKE 1 TABLET BY MOUTH EVERY DAY, Disp: 90 tablet, Rfl: 1  GLIMEPIRIDE 4 MG Oral Tab, TAKE 1 TABLET BY MOUTH 2 TIMES DAILY. , Disp: 180 tablet, Rfl: 1  Cyclobenzaprine HCl 5 MG Oral Tab, Take 1 tablet (5 mg total) by mouth 3 (three) times daily as complete sentences without distress. AXO x3. ASSESSMENT AND PLAN:   1. Skin rash   Clotrimazole/beta BID and otherwise keep feet dry. No socks in evenings. Call if not improving. Pt agrees.        The patient indicates understanding of these issues and [Left] : left shoulder [Sitting] : sitting [Moderate] : moderate [5___] : external rotation 5[unfilled]/5 [] : motor and sensory intact distally [TWNoteComboBox7] : active forward flexion 160 degrees [TWNoteComboBox6] : internal rotation 50 degrees [de-identified] : external rotation 45 degrees

## 2022-05-17 RX ORDER — INSULIN ASPART 100 [IU]/ML
INJECTION, SOLUTION INTRAVENOUS; SUBCUTANEOUS
Qty: 15 EACH | Refills: 2 | Status: SHIPPED | OUTPATIENT
Start: 2022-05-17

## 2022-05-17 NOTE — TELEPHONE ENCOUNTER
Please review; protocol failed/ no protocol.     Requested Prescriptions   Pending Prescriptions Disp Refills    Insulin Aspart Pen (NOVOLOG FLEXPEN) 100 UNIT/ML Subcutaneous Solution Pen-injector 15 each 2     Sig: Inject 18 units under the skin three times a day before meals        Diabetes Medication Protocol Failed - 5/16/2022  7:23 PM        Failed - Last A1C < 7.5 and within past 6 months     Lab Results   Component Value Date    A1C 7.9 (H) 12/22/2021               Passed - Appointment in past 6 or next 3 months        Passed - GFR Non- > 50     Lab Results   Component Value Date    GFRNAA 67 12/22/2021                 Passed - GFR in the past 12 months              Recent Outpatient Visits              3 months ago Type 2 diabetes mellitus without complication, with long-term current use of insulin [facility])    [place] [facility]    Office Visit    3 months ago Uncontrolled type 2 diabetes mellitus with hyperglycemia [facility])    [place] [facility], Lanie Medina, Ariana Pham MD    Office Visit    4 months ago Mixed hyperlipidemia    Margot Martinez MD    Office Visit    7 months ago Primary osteoarthritis of right knee    [facility] for Johnny Stoddard [place], Winston Cade MD    Office Visit    8 months ago Uncontrolled type 2 diabetes mellitus with hyperglycemia [facility])    [place] [facility] Ariana Pham MD    Office Visit            Future Appointments         Provider Department Appt Notes    In 1 week Trinity Dexter MD [place] [facility] [place] [facility]    In 2 months Bridger Roman MD [place] [facility] [place] 6 Month F/u

## 2022-05-20 NOTE — TELEPHONE ENCOUNTER
Protocol failed or has No Protocol, please review  Last office visit = 2/7/2022   Last refill = 11/11/2020    Requested Prescriptions   Pending Prescriptions Disp Refills    cyclobenzaprine 5 MG Oral Tab 30 tablet 0     Sig: Take 1 tablet (5 mg total) by mouth nightly.         There is no refill protocol information for this order         Future Appointments         Provider Department Appt Notes    In 5 days Fabiano Robbins MD [address] [place] follow up    In 2 months Hui Martinez MD [address] [place] 6 Month F/u          Recent Outpatient Visits              3 months ago Type 2 diabetes mellitus without complication, with long-term current use of insulin [facility])    Nate Mckeon Anastacio Naas, MD    Office Visit    3 months ago Uncontrolled type 2 diabetes mellitus with hyperglycemia [facility])    [address] Sulema Popp, MD    Office Visit    4 months ago Mixed hyperlipidemia    Kaleb Mckeon MD    Office Visit    7 months ago Primary osteoarthritis of right knee    [facility] [place] [facility] for Mikey Weaver MD    Office Visit    8 months ago Uncontrolled type 2 diabetes mellitus with hyperglycemia [facility])    [address] Sulema Popp, MD    Office Visit

## 2022-05-21 RX ORDER — CYCLOBENZAPRINE HCL 5 MG
5 TABLET ORAL NIGHTLY
Qty: 30 TABLET | Refills: 0 | Status: SHIPPED | OUTPATIENT
Start: 2022-05-21

## 2022-05-25 ENCOUNTER — OFFICE VISIT (OUTPATIENT)
Dept: ENDOCRINOLOGY CLINIC | Facility: CLINIC | Age: 67
End: 2022-05-25
Payer: MEDICARE

## 2022-05-25 VITALS
SYSTOLIC BLOOD PRESSURE: 130 MMHG | WEIGHT: 204 LBS | DIASTOLIC BLOOD PRESSURE: 82 MMHG | BODY MASS INDEX: 40 KG/M2 | HEART RATE: 77 BPM

## 2022-05-25 DIAGNOSIS — E83.52 HYPERCALCEMIA: ICD-10-CM

## 2022-05-25 DIAGNOSIS — E55.9 VITAMIN D DEFICIENCY: ICD-10-CM

## 2022-05-25 DIAGNOSIS — E11.65 UNCONTROLLED TYPE 2 DIABETES MELLITUS WITH HYPERGLYCEMIA (HCC): Primary | ICD-10-CM

## 2022-05-25 PROBLEM — E66.01 MORBID (SEVERE) OBESITY DUE TO EXCESS CALORIES (HCC): Status: ACTIVE | Noted: 2022-05-25

## 2022-05-25 LAB
CARTRIDGE LOT#: ABNORMAL NUMERIC
GLUCOSE BLOOD: 166
HEMOGLOBIN A1C: 7.1 % (ref 4.3–5.6)
TEST STRIP LOT #: NORMAL NUMERIC

## 2022-05-25 PROCEDURE — 83036 HEMOGLOBIN GLYCOSYLATED A1C: CPT | Performed by: INTERNAL MEDICINE

## 2022-05-25 PROCEDURE — 99214 OFFICE O/P EST MOD 30 MIN: CPT | Performed by: INTERNAL MEDICINE

## 2022-05-25 PROCEDURE — 82947 ASSAY GLUCOSE BLOOD QUANT: CPT | Performed by: INTERNAL MEDICINE

## 2022-05-25 RX ORDER — SEMAGLUTIDE 1.34 MG/ML
0.25 INJECTION, SOLUTION SUBCUTANEOUS WEEKLY
COMMUNITY

## 2022-05-27 ENCOUNTER — LAB ENCOUNTER (OUTPATIENT)
Dept: LAB | Age: 67
End: 2022-05-27
Attending: INTERNAL MEDICINE
Payer: MEDICARE

## 2022-05-27 DIAGNOSIS — E83.52 HYPERCALCEMIA: ICD-10-CM

## 2022-05-27 DIAGNOSIS — E55.9 VITAMIN D DEFICIENCY: ICD-10-CM

## 2022-05-27 LAB
ANION GAP SERPL CALC-SCNC: 6 MMOL/L (ref 0–18)
BUN BLD-MCNC: 23 MG/DL (ref 7–18)
BUN/CREAT SERPL: 23.5 (ref 10–20)
CALCIUM BLD-MCNC: 9.8 MG/DL (ref 8.5–10.1)
CHLORIDE SERPL-SCNC: 105 MMOL/L (ref 98–112)
CO2 SERPL-SCNC: 29 MMOL/L (ref 21–32)
CREAT BLD-MCNC: 0.98 MG/DL
FASTING STATUS PATIENT QL REPORTED: YES
GLUCOSE BLD-MCNC: 183 MG/DL (ref 70–99)
OSMOLALITY SERPL CALC.SUM OF ELEC: 298 MOSM/KG (ref 275–295)
POTASSIUM SERPL-SCNC: 4.3 MMOL/L (ref 3.5–5.1)
PTH-INTACT SERPL-MCNC: 9.3 PG/ML (ref 18.5–88)
SODIUM SERPL-SCNC: 140 MMOL/L (ref 136–145)
VIT D+METAB SERPL-MCNC: 35 NG/ML (ref 30–100)

## 2022-05-27 PROCEDURE — 80048 BASIC METABOLIC PNL TOTAL CA: CPT

## 2022-05-27 PROCEDURE — 36415 COLL VENOUS BLD VENIPUNCTURE: CPT

## 2022-05-27 PROCEDURE — 82306 VITAMIN D 25 HYDROXY: CPT

## 2022-05-27 PROCEDURE — 83970 ASSAY OF PARATHORMONE: CPT

## 2022-05-30 RX ORDER — AMLODIPINE BESYLATE 5 MG/1
TABLET ORAL
Qty: 90 TABLET | Refills: 1 | Status: SHIPPED | OUTPATIENT
Start: 2022-05-30 | End: 2022-12-05

## 2022-05-30 RX ORDER — PANTOPRAZOLE SODIUM 40 MG/1
TABLET, DELAYED RELEASE ORAL
Qty: 90 TABLET | Refills: 1 | Status: SHIPPED | OUTPATIENT
Start: 2022-05-30 | End: 2022-12-04

## 2022-05-30 NOTE — TELEPHONE ENCOUNTER
Refill passed per Teresa Smart protocol.       Requested Prescriptions   Pending Prescriptions Disp Refills    AMLODIPINE 5 MG Oral Tab [Pharmacy Med Name: AMLODIPINE BESYLATE 5 MG TAB] 90 tablet 1     Sig: TAKE 1 TABLET BY MOUTH EVERY DAY        Hypertensive Medications Protocol Passed - 5/30/2022 12:36 AM        Passed - CMP or BMP in past 12 months        Passed - Appointment in past 6 or next 3 months        Passed - GFR Non- > 50     Lab Results   Component Value Date    GFRNAA 61 (L) 05/27/2022                    PANTOPRAZOLE 40 MG Oral Tab EC [Pharmacy Med Name: PANTOPRAZOLE SOD DR 40 MG TAB] 90 tablet 1     Sig: TAKE 1 TABLET BY MOUTH EVERY DAY        Gastrointestional Medication Protocol Passed - 5/30/2022 12:36 AM        Passed - Appointment in past 15 or next 3 months               Future Appointments         Provider Department Appt Notes    In 2 months MD Teresa Barcenas [address], [place] 6 Month F/u    In 4 months MD Teresa Stephenson [address], [place] 4 mnth f/u            Recent Outpatient Visits              5 days ago Uncontrolled type 2 diabetes mellitus with hyperglycemia [facility])    Lanie Manrique Gayland Every, MD    Office Visit    3 months ago Type 2 diabetes mellitus without complication, with long-term current use of insulin [facility])    [address] MD    Office Visit    4 months ago Uncontrolled type 2 diabetes mellitus with hyperglycemia [facility])    [address] Manrique Gayland Every, MD    Office Visit    5 months ago Mixed hyperlipidemia    [address] MD    Office Visit    8 months ago Primary osteoarthritis of right knee    [facility] for Joel Foreman MD    Office Visit

## 2022-06-21 ENCOUNTER — OFFICE VISIT (OUTPATIENT)
Dept: FAMILY MEDICINE CLINIC | Facility: CLINIC | Age: 67
End: 2022-06-21
Payer: MEDICARE

## 2022-06-21 VITALS
HEART RATE: 96 BPM | DIASTOLIC BLOOD PRESSURE: 78 MMHG | WEIGHT: 204 LBS | HEIGHT: 60 IN | BODY MASS INDEX: 40.05 KG/M2 | SYSTOLIC BLOOD PRESSURE: 126 MMHG

## 2022-06-21 DIAGNOSIS — M25.511 ACUTE PAIN OF RIGHT SHOULDER: Primary | ICD-10-CM

## 2022-06-21 DIAGNOSIS — T14.8XXA BLISTER: ICD-10-CM

## 2022-06-21 PROCEDURE — 99213 OFFICE O/P EST LOW 20 MIN: CPT | Performed by: NURSE PRACTITIONER

## 2022-06-21 PROCEDURE — 1126F AMNT PAIN NOTED NONE PRSNT: CPT | Performed by: NURSE PRACTITIONER

## 2022-06-21 RX ORDER — CYCLOBENZAPRINE HCL 5 MG
5 TABLET ORAL 3 TIMES DAILY PRN
Qty: 60 TABLET | Refills: 1 | Status: SHIPPED | OUTPATIENT
Start: 2022-06-21

## 2022-06-23 NOTE — ASSESSMENT & PLAN NOTE
Do not use heating pad  Ice 20 min 4-6 times per day  Flexeril 5 mg I po tid prn   May use diclofenac gel  Tylenol for pain  Please call if symptoms worsen or are not resolving.

## 2022-06-23 NOTE — ASSESSMENT & PLAN NOTE
Apply antibiotics ointment tid  Discussed symptoms infection. Please let me know if you have any questions or concerns.

## 2022-07-04 ENCOUNTER — PATIENT MESSAGE (OUTPATIENT)
Dept: ENDOCRINOLOGY CLINIC | Facility: CLINIC | Age: 67
End: 2022-07-04

## 2022-07-05 ENCOUNTER — PATIENT MESSAGE (OUTPATIENT)
Dept: ENDOCRINOLOGY CLINIC | Facility: CLINIC | Age: 67
End: 2022-07-05

## 2022-07-05 RX ORDER — SEMAGLUTIDE 1.34 MG/ML
0.5 INJECTION, SOLUTION SUBCUTANEOUS WEEKLY
Qty: 1.5 ML | Refills: 0 | Status: SHIPPED | OUTPATIENT
Start: 2022-07-05

## 2022-07-05 NOTE — TELEPHONE ENCOUNTER
Per LOV 5/25/22:     \"-Continue Ozempic 0.5mg subcutaneous weekly (provided voucher so she could continue one more month)\"    RN sent script per written order and responded to patient making her aware.

## 2022-07-11 ENCOUNTER — HOSPITAL ENCOUNTER (OUTPATIENT)
Age: 67
Discharge: HOME | End: 2022-07-11
Payer: MEDICARE

## 2022-07-11 ENCOUNTER — APPOINTMENT (OUTPATIENT)
Dept: GENERAL RADIOLOGY | Age: 67
End: 2022-07-11
Attending: PHYSICIAN ASSISTANT
Payer: MEDICARE

## 2022-07-11 VITALS
SYSTOLIC BLOOD PRESSURE: 148 MMHG | HEIGHT: 60 IN | TEMPERATURE: 98 F | BODY MASS INDEX: 39.27 KG/M2 | HEART RATE: 91 BPM | RESPIRATION RATE: 18 BRPM | DIASTOLIC BLOOD PRESSURE: 79 MMHG | WEIGHT: 200 LBS | OXYGEN SATURATION: 98 %
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
## 2022-07-11 DIAGNOSIS — S80.211A KNEE ABRASION, RIGHT, INITIAL ENCOUNTER: ICD-10-CM

## 2022-07-11 PROCEDURE — 99213 OFFICE O/P EST LOW 20 MIN: CPT | Performed by: PHYSICIAN ASSISTANT

## 2022-07-11 PROCEDURE — 73560 X-RAY EXAM OF KNEE 1 OR 2: CPT | Performed by: PHYSICIAN ASSISTANT

## 2022-07-11 NOTE — ED INITIAL ASSESSMENT (HPI)
Pt stated that she tripped and fell on her bilateral hands and bilateral knees. No head injury. +abrasion on the right knee and left upper arm.

## 2022-07-16 ENCOUNTER — HOSPITAL ENCOUNTER (OUTPATIENT)
Dept: CT IMAGING | Facility: HOSPITAL | Age: 67
Discharge: HOME | End: 2022-07-16
Attending: FAMILY MEDICINE
Payer: MEDICARE

## 2022-07-16 ENCOUNTER — OFFICE VISIT (OUTPATIENT)
Dept: FAMILY MEDICINE CLINIC | Facility: CLINIC | Age: 67
End: 2022-07-16
Payer: MEDICARE

## 2022-07-16 VITALS
SYSTOLIC BLOOD PRESSURE: 133 MMHG | WEIGHT: 203.81 LBS | BODY MASS INDEX: 40.01 KG/M2 | HEIGHT: 60 IN | HEART RATE: 90 BPM | DIASTOLIC BLOOD PRESSURE: 81 MMHG

## 2022-07-16 DIAGNOSIS — R10.31 RIGHT LOWER QUADRANT ABDOMINAL PAIN: ICD-10-CM

## 2022-07-16 DIAGNOSIS — M54.9 BACK PAIN, UNSPECIFIED BACK LOCATION, UNSPECIFIED BACK PAIN LATERALITY, UNSPECIFIED CHRONICITY: Primary | ICD-10-CM

## 2022-07-16 DIAGNOSIS — E66.01 MORBID (SEVERE) OBESITY DUE TO EXCESS CALORIES (HCC): ICD-10-CM

## 2022-07-16 LAB
APPEARANCE: CLEAR
CREAT BLD-MCNC: 0.8 MG/DL
GLUCOSE (URINE DIPSTICK): NEGATIVE MG/DL
KETONES (URINE DIPSTICK): NEGATIVE MG/DL
LEUKOCYTES: NEGATIVE
MULTISTIX LOT#: ABNORMAL NUMERIC
NITRITE, URINE: NEGATIVE
OCCULT BLOOD: NEGATIVE
PH, URINE: 5.5 (ref 4.5–8)
PROTEIN (URINE DIPSTICK): 30 MG/DL
SPECIFIC GRAVITY: >=1.03 (ref 1–1.03)
URINE-COLOR: YELLOW
UROBILINOGEN,SEMI-QN: 0.2 MG/DL (ref 0–1.9)

## 2022-07-16 PROCEDURE — 81003 URINALYSIS AUTO W/O SCOPE: CPT | Performed by: FAMILY MEDICINE

## 2022-07-16 PROCEDURE — 99214 OFFICE O/P EST MOD 30 MIN: CPT | Performed by: FAMILY MEDICINE

## 2022-07-16 PROCEDURE — 82565 ASSAY OF CREATININE: CPT

## 2022-07-16 PROCEDURE — 74177 CT ABD & PELVIS W/CONTRAST: CPT | Performed by: FAMILY MEDICINE

## 2022-07-16 PROCEDURE — 1125F AMNT PAIN NOTED PAIN PRSNT: CPT | Performed by: FAMILY MEDICINE

## 2022-07-26 RX ORDER — INSULIN DETEMIR 100 [IU]/ML
70 INJECTION, SOLUTION SUBCUTANEOUS NIGHTLY
Qty: 3 ML | Refills: 3 | Status: SHIPPED | OUTPATIENT
Start: 2022-07-26 | End: 2022-07-28

## 2022-08-01 RX ORDER — METFORMIN HYDROCHLORIDE 500 MG/1
TABLET, EXTENDED RELEASE ORAL
Qty: 180 TABLET | Refills: 2 | Status: SHIPPED | OUTPATIENT
Start: 2022-08-01

## 2022-08-02 RX ORDER — INSULIN ASPART 100 [IU]/ML
INJECTION, SOLUTION INTRAVENOUS; SUBCUTANEOUS
Qty: 3 ML | Refills: 2 | Status: SHIPPED | OUTPATIENT
Start: 2022-08-02 | End: 2022-09-28

## 2022-08-04 DIAGNOSIS — M25.511 ACUTE PAIN OF RIGHT SHOULDER: ICD-10-CM

## 2022-08-05 RX ORDER — CYCLOBENZAPRINE HCL 5 MG
5 TABLET ORAL 3 TIMES DAILY PRN
Qty: 60 TABLET | Refills: 1 | Status: SHIPPED | OUTPATIENT
Start: 2022-08-05 | End: 2022-10-28

## 2022-08-08 ENCOUNTER — LAB ENCOUNTER (OUTPATIENT)
Dept: LAB | Age: 67
End: 2022-08-08
Attending: FAMILY MEDICINE
Payer: MEDICARE

## 2022-08-08 ENCOUNTER — OFFICE VISIT (OUTPATIENT)
Dept: FAMILY MEDICINE CLINIC | Facility: CLINIC | Age: 67
End: 2022-08-08
Payer: MEDICARE

## 2022-08-08 VITALS
HEART RATE: 74 BPM | TEMPERATURE: 98 F | DIASTOLIC BLOOD PRESSURE: 73 MMHG | WEIGHT: 203.63 LBS | HEIGHT: 60 IN | BODY MASS INDEX: 39.98 KG/M2 | SYSTOLIC BLOOD PRESSURE: 119 MMHG

## 2022-08-08 DIAGNOSIS — E11.9 TYPE 2 DIABETES MELLITUS WITHOUT COMPLICATION, WITH LONG-TERM CURRENT USE OF INSULIN (HCC): ICD-10-CM

## 2022-08-08 DIAGNOSIS — M25.561 CHRONIC PAIN OF BOTH KNEES: ICD-10-CM

## 2022-08-08 DIAGNOSIS — Z12.31 SCREENING MAMMOGRAM FOR BREAST CANCER: ICD-10-CM

## 2022-08-08 DIAGNOSIS — G89.29 CHRONIC PAIN OF BOTH KNEES: ICD-10-CM

## 2022-08-08 DIAGNOSIS — Z79.4 TYPE 2 DIABETES MELLITUS WITHOUT COMPLICATION, WITH LONG-TERM CURRENT USE OF INSULIN (HCC): ICD-10-CM

## 2022-08-08 DIAGNOSIS — M25.562 CHRONIC PAIN OF BOTH KNEES: ICD-10-CM

## 2022-08-08 DIAGNOSIS — E78.2 MIXED HYPERLIPIDEMIA: Primary | ICD-10-CM

## 2022-08-08 DIAGNOSIS — I10 ESSENTIAL HYPERTENSION, BENIGN: ICD-10-CM

## 2022-08-08 LAB
ALBUMIN SERPL-MCNC: 3.6 G/DL (ref 3.4–5)
ALBUMIN/GLOB SERPL: 1.1 {RATIO} (ref 1–2)
ALP LIVER SERPL-CCNC: 33 U/L
ALT SERPL-CCNC: 29 U/L
ANION GAP SERPL CALC-SCNC: 6 MMOL/L (ref 0–18)
AST SERPL-CCNC: 21 U/L (ref 15–37)
BASOPHILS # BLD AUTO: 0.07 X10(3) UL (ref 0–0.2)
BASOPHILS NFR BLD AUTO: 0.8 %
BILIRUB SERPL-MCNC: 0.5 MG/DL (ref 0.1–2)
BUN BLD-MCNC: 24 MG/DL (ref 7–18)
BUN/CREAT SERPL: 27.6 (ref 10–20)
CALCIUM BLD-MCNC: 10 MG/DL (ref 8.5–10.1)
CHLORIDE SERPL-SCNC: 109 MMOL/L (ref 98–112)
CHOLEST SERPL-MCNC: 111 MG/DL (ref ?–200)
CO2 SERPL-SCNC: 28 MMOL/L (ref 21–32)
CREAT BLD-MCNC: 0.87 MG/DL
CREAT UR-SCNC: 171 MG/DL
DEPRECATED RDW RBC AUTO: 42.3 FL (ref 35.1–46.3)
EOSINOPHIL # BLD AUTO: 0.22 X10(3) UL (ref 0–0.7)
EOSINOPHIL NFR BLD AUTO: 2.7 %
ERYTHROCYTE [DISTWIDTH] IN BLOOD BY AUTOMATED COUNT: 12.3 % (ref 11–15)
FASTING PATIENT LIPID ANSWER: YES
FASTING STATUS PATIENT QL REPORTED: YES
GFR SERPLBLD BASED ON 1.73 SQ M-ARVRAT: 72 ML/MIN/1.73M2 (ref 60–?)
GLOBULIN PLAS-MCNC: 3.3 G/DL (ref 2.8–4.4)
GLUCOSE BLD-MCNC: 149 MG/DL (ref 70–99)
HCT VFR BLD AUTO: 39.1 %
HDLC SERPL-MCNC: 39 MG/DL (ref 40–59)
HGB BLD-MCNC: 12.4 G/DL
IMM GRANULOCYTES # BLD AUTO: 0.07 X10(3) UL (ref 0–1)
IMM GRANULOCYTES NFR BLD: 0.8 %
LDLC SERPL CALC-MCNC: 48 MG/DL (ref ?–100)
LYMPHOCYTES # BLD AUTO: 2.38 X10(3) UL (ref 1–4)
LYMPHOCYTES NFR BLD AUTO: 28.8 %
MCH RBC QN AUTO: 29.4 PG (ref 26–34)
MCHC RBC AUTO-ENTMCNC: 31.7 G/DL (ref 31–37)
MCV RBC AUTO: 92.7 FL
MICROALBUMIN UR-MCNC: 1.19 MG/DL
MICROALBUMIN/CREAT 24H UR-RTO: 7 UG/MG (ref ?–30)
MONOCYTES # BLD AUTO: 0.33 X10(3) UL (ref 0.1–1)
MONOCYTES NFR BLD AUTO: 4 %
NEUTROPHILS # BLD AUTO: 5.18 X10 (3) UL (ref 1.5–7.7)
NEUTROPHILS # BLD AUTO: 5.18 X10(3) UL (ref 1.5–7.7)
NEUTROPHILS NFR BLD AUTO: 62.9 %
NONHDLC SERPL-MCNC: 72 MG/DL (ref ?–130)
OSMOLALITY SERPL CALC.SUM OF ELEC: 303 MOSM/KG (ref 275–295)
PLATELET # BLD AUTO: 334 10(3)UL (ref 150–450)
POTASSIUM SERPL-SCNC: 4.2 MMOL/L (ref 3.5–5.1)
PROT SERPL-MCNC: 6.9 G/DL (ref 6.4–8.2)
RBC # BLD AUTO: 4.22 X10(6)UL
SODIUM SERPL-SCNC: 143 MMOL/L (ref 136–145)
TRIGL SERPL-MCNC: 135 MG/DL (ref 30–149)
TSI SER-ACNC: 2.17 MIU/ML (ref 0.36–3.74)
VLDLC SERPL CALC-MCNC: 19 MG/DL (ref 0–30)
WBC # BLD AUTO: 8.3 X10(3) UL (ref 4–11)

## 2022-08-08 PROCEDURE — 82043 UR ALBUMIN QUANTITATIVE: CPT

## 2022-08-08 PROCEDURE — 80061 LIPID PANEL: CPT

## 2022-08-08 PROCEDURE — 84443 ASSAY THYROID STIM HORMONE: CPT

## 2022-08-08 PROCEDURE — 82570 ASSAY OF URINE CREATININE: CPT

## 2022-08-08 PROCEDURE — 99214 OFFICE O/P EST MOD 30 MIN: CPT | Performed by: FAMILY MEDICINE

## 2022-08-08 PROCEDURE — 1126F AMNT PAIN NOTED NONE PRSNT: CPT | Performed by: FAMILY MEDICINE

## 2022-08-08 PROCEDURE — 36415 COLL VENOUS BLD VENIPUNCTURE: CPT

## 2022-08-08 PROCEDURE — 80053 COMPREHEN METABOLIC PANEL: CPT

## 2022-08-08 PROCEDURE — 85025 COMPLETE CBC W/AUTO DIFF WBC: CPT

## 2022-09-02 RX ORDER — LISINOPRIL 30 MG/1
30 TABLET ORAL DAILY
Qty: 90 TABLET | Refills: 1 | Status: SHIPPED | OUTPATIENT
Start: 2022-09-02

## 2022-09-02 NOTE — TELEPHONE ENCOUNTER
Refill passed per [facility] protocol.     Requested Prescriptions   Pending Prescriptions Disp Refills    LISINOPRIL 30 MG Oral Tab [Pharmacy Med Name: LISINOPRIL 30 MG TABLET] 90 tablet 1     Sig: TAKE 1 TABLET BY MOUTH EVERY DAY        Hypertensive Medications Protocol Passed - 9/2/2022  2:49 AM        Passed - In person appointment in the past 12 or next 3 months       Recent Outpatient Visits              3 weeks ago Mixed hyperlipidemia    [address] Young Axon, MD    Office Visit    1 month ago Back pain, unspecified back location, unspecified back pain laterality, unspecified chronicity    [address] Young Axon, MD    Office Visit    2 months ago Acute pain of right shoulder    [address] APRN    Office Visit    3 months ago Uncontrolled type 2 diabetes mellitus with hyperglycemia [facility])    [facility], Lanie Medina, Leah Almeida MD    Office Visit    6 months ago Type 2 diabetes mellitus without complication, with long-term current use of insulin [facility])    [facility], Lanie Medina, Kian Melendez MD    Office Visit     Future Appointments         Provider Department Appt Notes    In 3 weeks Tiffany Knox MD [facility], [address], [place] [facility] 4 mnth f/u    In 5 months Kaity Torres MD [facility], [address], [place] f/u 6 months                Passed - Last BP reading less than 140/90     BP Readings from Last 1 Encounters:  08/08/22 : 119/73                Passed - CMP or BMP in past 6 months     Recent Results (from the past 4392 hour(s))   COMP METABOLIC PANEL (14)    Collection Time: 08/08/22  9:41 AM   Result Value Ref Range    Glucose 149 (H) 70 - 99 mg/dL    Sodium 143 136 - 145 mmol/L    Potassium 4.2 3.5 - 5.1 mmol/L    Chloride 109 98 - 112 mmol/L    CO2 28.0 21.0 - 32.0 mmol/L    Anion Gap 6 0 - 18 mmol/L    BUN 24 (H) 7 - 18 mg/dL    Creatinine 0. 87 0.55 - 1.02 mg/dL    BUN/CREA Ratio 27.6 (H) 10.0 - 20.0    Calcium, Total 10.0 8.5 - 10.1 mg/dL    Calculated Osmolality 303 (H) 275 - 295 mOsm/kg    eGFR-Cr 72 >=60 mL/min/1.73m2    ALT 29 13 - 56 U/L    AST 21 15 - 37 U/L    Alkaline Phosphatase 33 (L) 55 - 142 U/L    Bilirubin, Total 0.5 0.1 - 2.0 mg/dL    Total Protein 6.9 6.4 - 8.2 g/dL    Albumin 3.6 3.4 - 5.0 g/dL    Globulin  3.3 2.8 - 4.4 g/dL    A/G Ratio 1.1 1.0 - 2.0    Patient Fasting for CMP? Yes      *Note: Due to a large number of results and/or encounters for the requested time period, some results have not been displayed. A complete set of results can be found in Results Review.                  Passed - In person appointment or virtual visit in the past 6 months       Recent Outpatient Visits              3 weeks ago Mixed hyperlipidemia    [facility], [address], Rudy Soriano MD    Office Visit    1 month ago Back pain, unspecified back location, unspecified back pain laterality, unspecified chronicity    Sasha Zazueta, Danielle Dc MD    Office Visit    2 months ago Acute pain of right shoulder    Gee Zazueta APRN    Office Visit    3 months ago Uncontrolled type 2 diabetes mellitus with hyperglycemia [facility])    [facility], [address], Cat Mckeon MD    Office Visit    6 months ago Type 2 diabetes mellitus without complication, with long-term current use of insulin [facility])    [place] [facility], [address], [address], Danielle Dc MD    Office Visit     Future Appointments         Provider Department Appt Notes    In 3 weeks Aracelis Scott MD [facility], [address], [place] 4 mnth f/u    In 5 months Asia Green MD [place] [facility], [address], [place] f/u 6 months                Passed - GFR > 50     Lab Results   Component Value Date    [facility] 72 08/08/2022                       Recent Outpatient Visits              3 weeks ago Mixed hyperlipidemia    [address] Bartholomew Parma, MD    Office Visit    1 month ago Back pain, unspecified back location, unspecified back pain laterality, unspecified chronicity    [address] Bartholomew Parma, MD    Office Visit    2 months ago Acute pain of right shoulder    [address] APRN    Office Visit    3 months ago Uncontrolled type 2 diabetes mellitus with hyperglycemia [facility])    [address] [place], [address], Eulalio Hall MD    Office Visit    6 months ago Type 2 diabetes mellitus without complication, with long-term current use of insulin [facility])    [address], Niranjan Mart MD    Office Visit            Future Appointments         Provider Department Appt Notes    In 3 weeks Opal Meyer MD [address], [address] [place] [address] 4 mnth f/u    In 5 months Tahira Parekh MD [address], Gee f/u 6 months

## 2022-09-28 ENCOUNTER — OFFICE VISIT (OUTPATIENT)
Dept: ENDOCRINOLOGY CLINIC | Facility: CLINIC | Age: 67
End: 2022-09-28

## 2022-09-28 VITALS
BODY MASS INDEX: 40 KG/M2 | WEIGHT: 205 LBS | SYSTOLIC BLOOD PRESSURE: 147 MMHG | HEART RATE: 65 BPM | DIASTOLIC BLOOD PRESSURE: 74 MMHG

## 2022-09-28 DIAGNOSIS — E11.65 UNCONTROLLED TYPE 2 DIABETES MELLITUS WITH HYPERGLYCEMIA (HCC): Primary | ICD-10-CM

## 2022-09-28 LAB
CARTRIDGE LOT#: NORMAL NUMERIC
GLUCOSE BLOOD: 160
TEST STRIP LOT #: NORMAL NUMERIC

## 2022-09-28 PROCEDURE — 83036 HEMOGLOBIN GLYCOSYLATED A1C: CPT | Performed by: INTERNAL MEDICINE

## 2022-09-28 PROCEDURE — 90662 IIV NO PRSV INCREASED AG IM: CPT | Performed by: INTERNAL MEDICINE

## 2022-09-28 PROCEDURE — 36416 COLLJ CAPILLARY BLOOD SPEC: CPT | Performed by: INTERNAL MEDICINE

## 2022-09-28 PROCEDURE — 99214 OFFICE O/P EST MOD 30 MIN: CPT | Performed by: INTERNAL MEDICINE

## 2022-09-28 PROCEDURE — 95251 CONT GLUC MNTR ANALYSIS I&R: CPT | Performed by: INTERNAL MEDICINE

## 2022-09-28 PROCEDURE — G0008 ADMIN INFLUENZA VIRUS VAC: HCPCS | Performed by: INTERNAL MEDICINE

## 2022-09-28 RX ORDER — INSULIN DETEMIR 100 [IU]/ML
65 INJECTION, SOLUTION SUBCUTANEOUS NIGHTLY
Qty: 30 EACH | Refills: 3 | Status: SHIPPED | OUTPATIENT
Start: 2022-09-28

## 2022-09-28 RX ORDER — INSULIN ASPART 100 [IU]/ML
INJECTION, SOLUTION INTRAVENOUS; SUBCUTANEOUS
Qty: 30 ML | Refills: 2 | Status: SHIPPED | OUTPATIENT
Start: 2022-09-28

## 2022-09-28 NOTE — PATIENT INSTRUCTIONS
Levemir 65 units subcutaneous bedtime    Novolog  INSULIN SLIDING SCALE  Base Values  Breakfast: 20  Lunch: 22  Dinner: 22  Ranges:  80-99: -2  100-119: 0  120-139: 0  140-159: 1  160-179: 1  180-199: 2  200-219: 2  220-239: 3  240-259: 3  260-279: 4  280-299: 4  300-319: 5  320-339: 5  340-359: 6  360-379: 6  380-399: 7

## 2022-10-19 RX ORDER — ALPRAZOLAM 0.25 MG/1
TABLET ORAL
Qty: 30 TABLET | Refills: 5 | Status: SHIPPED | OUTPATIENT
Start: 2022-10-19

## 2022-10-21 RX ORDER — MELOXICAM 15 MG/1
15 TABLET ORAL DAILY
Qty: 90 TABLET | Refills: 1 | Status: SHIPPED | OUTPATIENT
Start: 2022-10-21

## 2022-10-21 NOTE — TELEPHONE ENCOUNTER
Refill passed per [address] protocol. Requested Prescriptions   Pending Prescriptions Disp Refills    MELOXICAM 15 MG Oral Tab [Pharmacy Med Name: MELOXICAM 15 MG TABLET] 90 tablet 1     Sig: Take 1 tablet (15 mg total) by mouth daily.         Non-Narcotic Pain Medication Protocol Passed - 10/21/2022  2:01 AM        Passed - In person appointment or virtual visit in the past 6 mos or appointment in next 3 mos       Recent Outpatient Visits              3 weeks ago Uncontrolled type 2 diabetes mellitus with hyperglycemia [facility])    [address], Brea French MD    Office Visit    2 months ago Mixed hyperlipidemia    [address] MD    Office Visit    3 months ago Back pain, unspecified back location, unspecified back pain laterality, unspecified chronicity    [address] MD    Office Visit    4 months ago Acute pain of right shoulder    [address] APRN    Office Visit    4 months ago Uncontrolled type 2 diabetes mellitus with hyperglycemia [facility])    [address], Brea French MD    Office Visit     Future Appointments         Provider Department Appt Notes    In 1 week ADO DEXA RM1; [address]    In 3 months Salinas Manley MD [address], [address], [place] f/u 4 months     In 3 months Brigida Zimmerman MD [address], [address], [place] f/u 6 months                       Recent Outpatient Visits              3 weeks ago Uncontrolled type 2 diabetes mellitus with hyperglycemia [facility])    [address], Brea French MD    Office Visit    2 months ago Mixed hyperlipidemia    Pilar Mendiola MD    Office Visit    3 months ago Back pain, unspecified back location, unspecified back pain laterality, unspecified chronicity    Gee Dowd MD    Office Visit    4 months ago Acute pain of right shoulder    G[address] Dowd APRN    Office Visit    4 months ago Uncontrolled type 2 diabetes mellitus with hyperglycemia [facility])    [facility], [facility], [address], Lyndsey Smiley MD    Office Visit            Future Appointments         Provider Department Appt Notes    In 1 week ADO DEXA RM1; [address]    In 3 months Williams Ren MD [facility], [facility], [address], G[place] f/u 4 months     In 3 months Jocelyn Ramirez MD [facility], [facility], [address], [place] f/u 6 months

## 2022-10-27 DIAGNOSIS — M25.511 ACUTE PAIN OF RIGHT SHOULDER: ICD-10-CM

## 2022-10-28 RX ORDER — CYCLOBENZAPRINE HCL 5 MG
TABLET ORAL
Qty: 60 TABLET | Refills: 1 | Status: SHIPPED | OUTPATIENT
Start: 2022-10-28

## 2022-11-01 ENCOUNTER — HOSPITAL ENCOUNTER (OUTPATIENT)
Dept: MAMMOGRAPHY | Age: 67
Discharge: HOME | End: 2022-11-01
Attending: FAMILY MEDICINE
Payer: MEDICARE

## 2022-11-01 DIAGNOSIS — Z12.31 SCREENING MAMMOGRAM FOR BREAST CANCER: ICD-10-CM

## 2022-11-01 PROCEDURE — 77067 SCR MAMMO BI INCL CAD: CPT | Performed by: FAMILY MEDICINE

## 2022-11-01 PROCEDURE — 77063 BREAST TOMOSYNTHESIS BI: CPT | Performed by: FAMILY MEDICINE

## 2022-11-11 ENCOUNTER — PATIENT MESSAGE (OUTPATIENT)
Dept: FAMILY MEDICINE CLINIC | Facility: CLINIC | Age: 67
End: 2022-11-11

## 2022-11-14 NOTE — TELEPHONE ENCOUNTER
Carla - Can you comment on message below? I don't think that will work to use syringes to draw insulin from the pen?

## 2022-11-14 NOTE — TELEPHONE ENCOUNTER
Dr Erasmo Altamirano manages Angy's diabetes. I have never received this request and would not know if that last bit should be extracted and If actual insulin left.

## 2022-11-14 NOTE — TELEPHONE ENCOUNTER
Hi Dr Krys Berg,    This is something I have not heard of before. Patients can pull up using a syringe from a pen but it is typically used as a last resort (when there is plenty of units left) verses trying to use the last units from the pen. The pen needle and syringe at insert on the pen should just about the same length so I would think it would be the same? We do have syringes at [facility], pt could have a sample of syringes (I believe 10 come in a bag) and try it?

## 2022-11-17 NOTE — TELEPHONE ENCOUNTER
Dr Vinny Donahue,    Please see below. I am assuming this is the 'extra' insulin that is in the pens that companys place for priming.  Please approve prescription if OK with it

## 2022-11-26 ENCOUNTER — HOSPITAL ENCOUNTER (OUTPATIENT)
Age: 67
Discharge: HOME | End: 2022-11-26
Payer: MEDICARE

## 2022-11-26 ENCOUNTER — APPOINTMENT (OUTPATIENT)
Dept: GENERAL RADIOLOGY | Age: 67
End: 2022-11-26
Attending: NURSE PRACTITIONER
Payer: MEDICARE

## 2022-11-26 ENCOUNTER — NURSE TRIAGE (OUTPATIENT)
Dept: FAMILY MEDICINE CLINIC | Facility: CLINIC | Age: 67
End: 2022-11-26

## 2022-11-26 VITALS
RESPIRATION RATE: 19 BRPM | DIASTOLIC BLOOD PRESSURE: 85 MMHG | OXYGEN SATURATION: 98 % | TEMPERATURE: 98 F | HEART RATE: 78 BPM | SYSTOLIC BLOOD PRESSURE: 148 MMHG

## 2022-11-26 DIAGNOSIS — M25.551 HIP PAIN, ACUTE, RIGHT: Primary | ICD-10-CM

## 2022-11-26 DIAGNOSIS — M54.50 ACUTE LEFT-SIDED LOW BACK PAIN, UNSPECIFIED WHETHER SCIATICA PRESENT: ICD-10-CM

## 2022-11-26 PROCEDURE — 96372 THER/PROPH/DIAG INJ SC/IM: CPT | Performed by: NURSE PRACTITIONER

## 2022-11-26 PROCEDURE — 73502 X-RAY EXAM HIP UNI 2-3 VIEWS: CPT | Performed by: NURSE PRACTITIONER

## 2022-11-26 PROCEDURE — 99213 OFFICE O/P EST LOW 20 MIN: CPT | Performed by: NURSE PRACTITIONER

## 2022-11-26 RX ORDER — KETOROLAC TROMETHAMINE 30 MG/ML
30 INJECTION, SOLUTION INTRAMUSCULAR; INTRAVENOUS ONCE
Status: COMPLETED | OUTPATIENT
Start: 2022-11-26 | End: 2022-11-26

## 2022-11-26 NOTE — DISCHARGE INSTRUCTIONS
Continue Tylenol for pain. Do not take any meloxicam or NSAIDs as you received a shot of Toradol here in the [facility]. You may alternate heat or ice to area of discomfort or you may try over-the-counter topical lidocaine patch or icy hot. Follow-up with your primary care provider 2 to 3 days take the first available appointment. If you develop any new or worsening symptoms worsening pain unintentionally urinate or have a bowel movement on yourself wake up cannot feel your lower extremities or ambulate call 911 go to the nearest emergency department.

## 2022-11-26 NOTE — ED INITIAL ASSESSMENT (HPI)
Pt presents with right hip pain x 3-4 days. Hx of osteoarthritis. No medication taken for pain today. No fall or injury.

## 2022-12-04 RX ORDER — PANTOPRAZOLE SODIUM 40 MG/1
TABLET, DELAYED RELEASE ORAL
Qty: 90 TABLET | Refills: 1 | Status: SHIPPED | OUTPATIENT
Start: 2022-12-04

## 2022-12-05 RX ORDER — AMLODIPINE BESYLATE 5 MG/1
TABLET ORAL
Qty: 90 TABLET | Refills: 1 | Status: SHIPPED | OUTPATIENT
Start: 2022-12-05

## 2022-12-05 NOTE — TELEPHONE ENCOUNTER
Refill passed per [facility] protocol.    Requested Prescriptions   Pending Prescriptions Disp Refills    Insulin Pen Needle 32G X 6 MM Does not apply Misc 400 each 3     Sig: USE 4 TIMES A DAY       Diabetic Supplies Protocol Passed - 12/4/2022  7:41 PM        Passed - In person appointment or virtual visit in the past 12 mos or appointment in next 3 mos     Recent Outpatient Visits              2 months ago Uncontrolled type 2 diabetes mellitus with hyperglycemia [facility])    [facility], [address], Blanca Watt MD    Office Visit    3 months ago Mixed hyperlipidemia    [facility], [address], Aly Walsh MD    Office Visit    4 months ago Back pain, unspecified back location, unspecified back pain laterality, unspecified chronicity    [address], Leo Pham MD    Office Visit    5 months ago Acute pain of right shoulder    [facility], [address], RAIN Ackerman    Office Visit    6 months ago Uncontrolled type 2 diabetes mellitus with hyperglycemia [facility])    [facility], [address], Blanca Watt MD    Office Visit          Future Appointments         Provider Department Appt Notes    Tomorrow Hai Cash MD [facility], [address], [place] urgert care follow up / discuss referral for ortho- ok per Dr    In 1 month Petrona Dickey MD [facility], [address], [place] f/u 4 months     In 2 months Hai Cash MD [facility], [address], [place] f/u 6 months                      Recent Outpatient Visits              2 months ago Uncontrolled type 2 diabetes mellitus with hyperglycemia [facility])    [address] MD    Office Visit    3 months ago Mixed hyperlipidemia    Genie Lehman MD    Office Visit    4 months ago Back pain, unspecified back location, unspecified back pain laterality, unspecified chronicity [address] MD    Office Visit    5 months ago Acute pain of right shoulder    [address] APRN    Office Visit    6 months ago Uncontrolled type 2 diabetes mellitus with hyperglycemia [facility])    [place] [employer], [facility], [address], Luiz Obregon MD    Office Visit            Future Appointments         Provider Department Appt Notes    Tomorrow Domitila Pugh MD [place] [employer] [facility], [address], G[place] urgert care follow up / discuss referral for ortho- ok per Dr    In 1 month Miriam Uriarte MD [place] [employer] [facility], [address], [place] f/u 4 months     In 2 months Domitila Pugh MD [place] [employer] [facility], [address], [place] f/u 6 months

## 2022-12-06 ENCOUNTER — OFFICE VISIT (OUTPATIENT)
Dept: FAMILY MEDICINE CLINIC | Facility: CLINIC | Age: 67
End: 2022-12-06
Payer: MEDICARE

## 2022-12-06 VITALS
WEIGHT: 207.38 LBS | DIASTOLIC BLOOD PRESSURE: 79 MMHG | TEMPERATURE: 97 F | HEART RATE: 72 BPM | BODY MASS INDEX: 41 KG/M2 | SYSTOLIC BLOOD PRESSURE: 127 MMHG

## 2022-12-06 DIAGNOSIS — M25.562 CHRONIC PAIN OF BOTH KNEES: ICD-10-CM

## 2022-12-06 DIAGNOSIS — M76.31 IT BAND SYNDROME, RIGHT: ICD-10-CM

## 2022-12-06 DIAGNOSIS — M25.551 RIGHT HIP PAIN: Primary | ICD-10-CM

## 2022-12-06 DIAGNOSIS — G89.29 CHRONIC PAIN OF BOTH KNEES: ICD-10-CM

## 2022-12-06 DIAGNOSIS — M25.561 CHRONIC PAIN OF BOTH KNEES: ICD-10-CM

## 2022-12-06 PROCEDURE — 99213 OFFICE O/P EST LOW 20 MIN: CPT | Performed by: FAMILY MEDICINE

## 2022-12-06 PROCEDURE — 1125F AMNT PAIN NOTED PAIN PRSNT: CPT | Performed by: FAMILY MEDICINE

## 2022-12-25 RX ORDER — FENOFIBRATE 200 MG/1
200 CAPSULE ORAL
Qty: 90 CAPSULE | Refills: 1 | Status: SHIPPED | OUTPATIENT
Start: 2022-12-25

## 2022-12-26 NOTE — TELEPHONE ENCOUNTER
Refill passed per [employer] protocol.   Requested Prescriptions   Pending Prescriptions Disp Refills    fenofibrate micronized 200 MG Oral Cap 90 capsule 4     Sig: Take 1 capsule (200 mg total) by mouth every morning before breakfast.       Cholesterol Medication Protocol Passed - 12/25/2022  6:14 PM        Passed - ALT in past 12 months        Passed - LDL in past 12 months        Passed - Last ALT < 80     Lab Results   Component Value Date    ALT 29 08/08/2022             Passed - Last LDL < 130     Lab Results   Component Value Date    LDL 48 08/08/2022             Passed - In person appointment or virtual visit in the past 12 mos or appointment in next 3 mos     Recent Outpatient Visits              2 weeks ago Right hip pain    [address] MD    Office Visit    2 months ago Uncontrolled type 2 diabetes mellitus with hyperglycemia [facility])    [employer] [facility], [address], Sahil Hooper MD    Office Visit    4 months ago Mixed hyperlipidemia    [address] MD    Office Visit    5 months ago Back pain, unspecified back location, unspecified back pain laterality, unspecified chronicity    [address] MD    Office Visit    6 months ago Acute pain of right shoulder    [employer] [facility], [address], RAIN Garcia    Office Visit          Future Appointments         Provider Department Appt Notes    In 1 month Ishmael Salas MD [employer] [facility], [address], P[place]rez Pride f/u 4 months     In 1 month Thao Jerome MD [employer] [facility], [address], [place] f/u 6 months

## 2023-02-01 ENCOUNTER — OFFICE VISIT (OUTPATIENT)
Dept: ENDOCRINOLOGY CLINIC | Facility: CLINIC | Age: 68
End: 2023-02-01

## 2023-02-01 VITALS
BODY MASS INDEX: 40 KG/M2 | SYSTOLIC BLOOD PRESSURE: 124 MMHG | HEART RATE: 69 BPM | WEIGHT: 206 LBS | DIASTOLIC BLOOD PRESSURE: 84 MMHG

## 2023-02-01 DIAGNOSIS — E11.65 UNCONTROLLED TYPE 2 DIABETES MELLITUS WITH HYPERGLYCEMIA (HCC): Primary | ICD-10-CM

## 2023-02-01 LAB
CARTRIDGE LOT#: ABNORMAL NUMERIC
GLUCOSE BLOOD: 187
HEMOGLOBIN A1C: 7.2 % (ref 4.3–5.6)
TEST STRIP LOT #: NORMAL NUMERIC

## 2023-02-01 PROCEDURE — 82947 ASSAY GLUCOSE BLOOD QUANT: CPT | Performed by: INTERNAL MEDICINE

## 2023-02-01 PROCEDURE — 99213 OFFICE O/P EST LOW 20 MIN: CPT | Performed by: INTERNAL MEDICINE

## 2023-02-01 PROCEDURE — 95251 CONT GLUC MNTR ANALYSIS I&R: CPT | Performed by: INTERNAL MEDICINE

## 2023-02-01 PROCEDURE — 83036 HEMOGLOBIN GLYCOSYLATED A1C: CPT | Performed by: INTERNAL MEDICINE

## 2023-02-08 ENCOUNTER — OFFICE VISIT (OUTPATIENT)
Dept: FAMILY MEDICINE CLINIC | Facility: CLINIC | Age: 68
End: 2023-02-08

## 2023-02-08 ENCOUNTER — LAB ENCOUNTER (OUTPATIENT)
Dept: LAB | Age: 68
End: 2023-02-08
Attending: FAMILY MEDICINE
Payer: MEDICARE

## 2023-02-08 VITALS
BODY MASS INDEX: 39.76 KG/M2 | HEIGHT: 60.25 IN | SYSTOLIC BLOOD PRESSURE: 111 MMHG | TEMPERATURE: 98 F | HEART RATE: 73 BPM | WEIGHT: 205.19 LBS | DIASTOLIC BLOOD PRESSURE: 67 MMHG

## 2023-02-08 DIAGNOSIS — R01.1 CARDIAC MURMUR: ICD-10-CM

## 2023-02-08 DIAGNOSIS — Z79.4 TYPE 2 DIABETES MELLITUS WITHOUT COMPLICATION, WITH LONG-TERM CURRENT USE OF INSULIN (HCC): ICD-10-CM

## 2023-02-08 DIAGNOSIS — E11.9 TYPE 2 DIABETES MELLITUS WITHOUT COMPLICATION, WITH LONG-TERM CURRENT USE OF INSULIN (HCC): ICD-10-CM

## 2023-02-08 DIAGNOSIS — E55.9 VITAMIN D DEFICIENCY: ICD-10-CM

## 2023-02-08 DIAGNOSIS — Z78.0 POST-MENOPAUSAL: ICD-10-CM

## 2023-02-08 DIAGNOSIS — H04.123 DRY EYE SYNDROME OF BILATERAL LACRIMAL GLANDS: ICD-10-CM

## 2023-02-08 DIAGNOSIS — Z00.00 ENCOUNTER FOR ANNUAL HEALTH EXAMINATION: ICD-10-CM

## 2023-02-08 DIAGNOSIS — Z12.31 VISIT FOR SCREENING MAMMOGRAM: ICD-10-CM

## 2023-02-08 DIAGNOSIS — I10 ESSENTIAL HYPERTENSION, BENIGN: Primary | ICD-10-CM

## 2023-02-08 DIAGNOSIS — E66.01 MORBID (SEVERE) OBESITY DUE TO EXCESS CALORIES (HCC): ICD-10-CM

## 2023-02-08 DIAGNOSIS — E78.2 MIXED HYPERLIPIDEMIA: ICD-10-CM

## 2023-02-08 DIAGNOSIS — E11.3291 MILD NONPROLIFERATIVE DIABETIC RETINOPATHY OF RIGHT EYE WITHOUT MACULAR EDEMA ASSOCIATED WITH TYPE 2 DIABETES MELLITUS (HCC): ICD-10-CM

## 2023-02-08 PROBLEM — T14.8XXA BLISTER: Status: RESOLVED | Noted: 2022-06-21 | Resolved: 2023-02-08

## 2023-02-08 PROBLEM — M25.511 ACUTE PAIN OF RIGHT SHOULDER: Status: RESOLVED | Noted: 2022-06-21 | Resolved: 2023-02-08

## 2023-02-08 LAB
ALBUMIN SERPL-MCNC: 3.5 G/DL (ref 3.4–5)
ALBUMIN/GLOB SERPL: 1.1 {RATIO} (ref 1–2)
ALP LIVER SERPL-CCNC: 29 U/L
ALT SERPL-CCNC: 24 U/L
ANION GAP SERPL CALC-SCNC: 6 MMOL/L (ref 0–18)
AST SERPL-CCNC: 19 U/L (ref 15–37)
BASOPHILS # BLD AUTO: 0.05 X10(3) UL (ref 0–0.2)
BASOPHILS NFR BLD AUTO: 0.6 %
BILIRUB SERPL-MCNC: 0.5 MG/DL (ref 0.1–2)
BUN BLD-MCNC: 21 MG/DL (ref 7–18)
BUN/CREAT SERPL: 21.9 (ref 10–20)
CALCIUM BLD-MCNC: 9.8 MG/DL (ref 8.5–10.1)
CHLORIDE SERPL-SCNC: 109 MMOL/L (ref 98–112)
CHOLEST SERPL-MCNC: 105 MG/DL (ref ?–200)
CO2 SERPL-SCNC: 28 MMOL/L (ref 21–32)
CREAT BLD-MCNC: 0.96 MG/DL
CREAT UR-SCNC: 337 MG/DL
DEPRECATED RDW RBC AUTO: 41.4 FL (ref 35.1–46.3)
EOSINOPHIL # BLD AUTO: 0.2 X10(3) UL (ref 0–0.7)
EOSINOPHIL NFR BLD AUTO: 2.5 %
ERYTHROCYTE [DISTWIDTH] IN BLOOD BY AUTOMATED COUNT: 12.5 % (ref 11–15)
FASTING PATIENT LIPID ANSWER: YES
FASTING STATUS PATIENT QL REPORTED: YES
GFR SERPLBLD BASED ON 1.73 SQ M-ARVRAT: 64 ML/MIN/1.73M2 (ref 60–?)
GLOBULIN PLAS-MCNC: 3.3 G/DL (ref 2.8–4.4)
GLUCOSE BLD-MCNC: 180 MG/DL (ref 70–99)
HCT VFR BLD AUTO: 37.6 %
HDLC SERPL-MCNC: 44 MG/DL (ref 40–59)
HGB BLD-MCNC: 12 G/DL
IMM GRANULOCYTES # BLD AUTO: 0.05 X10(3) UL (ref 0–1)
IMM GRANULOCYTES NFR BLD: 0.6 %
LDLC SERPL CALC-MCNC: 34 MG/DL (ref ?–100)
LYMPHOCYTES # BLD AUTO: 2.61 X10(3) UL (ref 1–4)
LYMPHOCYTES NFR BLD AUTO: 32.5 %
MCH RBC QN AUTO: 29.1 PG (ref 26–34)
MCHC RBC AUTO-ENTMCNC: 31.9 G/DL (ref 31–37)
MCV RBC AUTO: 91.3 FL
MICROALBUMIN UR-MCNC: 2.43 MG/DL
MICROALBUMIN/CREAT 24H UR-RTO: 7.2 UG/MG (ref ?–30)
MONOCYTES # BLD AUTO: 0.35 X10(3) UL (ref 0.1–1)
MONOCYTES NFR BLD AUTO: 4.4 %
NEUTROPHILS # BLD AUTO: 4.76 X10 (3) UL (ref 1.5–7.7)
NEUTROPHILS # BLD AUTO: 4.76 X10(3) UL (ref 1.5–7.7)
NEUTROPHILS NFR BLD AUTO: 59.4 %
NONHDLC SERPL-MCNC: 61 MG/DL (ref ?–130)
OSMOLALITY SERPL CALC.SUM OF ELEC: 304 MOSM/KG (ref 275–295)
PLATELET # BLD AUTO: 364 10(3)UL (ref 150–450)
POTASSIUM SERPL-SCNC: 3.5 MMOL/L (ref 3.5–5.1)
PROT SERPL-MCNC: 6.8 G/DL (ref 6.4–8.2)
RBC # BLD AUTO: 4.12 X10(6)UL
SODIUM SERPL-SCNC: 143 MMOL/L (ref 136–145)
TRIGL SERPL-MCNC: 166 MG/DL (ref 30–149)
TSI SER-ACNC: 1.72 MIU/ML (ref 0.36–3.74)
VIT D+METAB SERPL-MCNC: 33.2 NG/ML (ref 30–100)
VLDLC SERPL CALC-MCNC: 22 MG/DL (ref 0–30)
WBC # BLD AUTO: 8 X10(3) UL (ref 4–11)

## 2023-02-08 PROCEDURE — 80061 LIPID PANEL: CPT

## 2023-02-08 PROCEDURE — G0439 PPPS, SUBSEQ VISIT: HCPCS | Performed by: FAMILY MEDICINE

## 2023-02-08 PROCEDURE — 85025 COMPLETE CBC W/AUTO DIFF WBC: CPT

## 2023-02-08 PROCEDURE — 82306 VITAMIN D 25 HYDROXY: CPT

## 2023-02-08 PROCEDURE — 84443 ASSAY THYROID STIM HORMONE: CPT

## 2023-02-08 PROCEDURE — 1126F AMNT PAIN NOTED NONE PRSNT: CPT | Performed by: FAMILY MEDICINE

## 2023-02-08 PROCEDURE — 80053 COMPREHEN METABOLIC PANEL: CPT

## 2023-02-08 PROCEDURE — 36415 COLL VENOUS BLD VENIPUNCTURE: CPT

## 2023-02-08 PROCEDURE — 82570 ASSAY OF URINE CREATININE: CPT

## 2023-02-08 PROCEDURE — 82043 UR ALBUMIN QUANTITATIVE: CPT

## 2023-02-22 NOTE — TELEPHONE ENCOUNTER
Chart reviewed, Insulin syringe 30G X 5/16 0.5 ml and insulin pen needle 32G X 6MM erx'd to [facility] pharmacy for 3 months supply.      Diabetes Medications  Protocol Criteria:  · Appointment scheduled in the past 6 months or the next 3 months  · A1C < 7.5 in th 58-year-old female history of breast cancer dx'ed 3 years ago status post lumpectomy, alcohol use disorder stopped drinking on 1/20/23, history of hepatitis secondary to live hepatitis vaccine, history of hypertension presenting for evaluation of jaundice and abdominal distention since 1/19. Pt saw PCP when she noted she was turning yellow in Jan, PCP did blood work and sent pt to GI. Patient was evaluated in her GIs office Dr. Lazcano who sent her into the emergency department for admission for MRCP and further evaluation.  Patient states she used to have 2 glasses of vodka daily for 3 years to treat her chronic pain from her breast cancer.  Patient quit cold turkey on 1/20 without experiencing any symptoms of withdrawal.  Patient states her eyes and skin have been progressively getting more yellow since 1/20.  Patient denies nausea, vomiting, fevers, chills, abdominal pain, dark stools, pale stools, changes in urine color.  Patient reports she was on Letrozole for her breast cancer, and was taking Tylenol daily at recommended doses to treat her pain, she is concerned that the drug interaction may have damaged her liver.  Patient has stopped taking Tylenol for several months now. Nephrology consulted for renal failure.     A/P    ELIZABETH   unclear baseline   renal failure possilby sec to hepatorenal vs pre renal  s/p paracentesis on 2/17 with 2 L removal   REnal function improved  ct with contrast 02/17/23 - monitor contrast induce nephropathy   on steroid per hepatology -BUN remains stable   will avoid ivf in setting of fluid overload   Avoid further nephrotoxins, NSAIDS, RCA   monitor BMP, U/O closely     hyponatremia   sec to fluid overload   improved   fluid restriction <1L a day   monitor Na     Proteinuria/ hematuria   +Leuk Esterase: Large  repeat UA post tx    Acidosis with/without anion gap   Renal failure + lactate is elevated  continue oral bicarb   monitor

## 2023-03-02 RX ORDER — INSULIN DETEMIR 100 [IU]/ML
65 INJECTION, SOLUTION SUBCUTANEOUS NIGHTLY
Qty: 30 EACH | Refills: 3 | Status: SHIPPED | OUTPATIENT
Start: 2023-03-02 | End: 2023-03-03 | Stop reason: RX

## 2023-03-03 ENCOUNTER — TELEPHONE (OUTPATIENT)
Dept: ENDOCRINOLOGY CLINIC | Facility: CLINIC | Age: 68
End: 2023-03-03

## 2023-03-03 ENCOUNTER — TELEPHONE (OUTPATIENT)
Facility: CLINIC | Age: 68
End: 2023-03-03

## 2023-03-03 RX ORDER — LISINOPRIL 30 MG/1
30 TABLET ORAL DAILY
Qty: 90 TABLET | Refills: 3 | Status: SHIPPED | OUTPATIENT
Start: 2023-03-03

## 2023-03-03 RX ORDER — INSULIN DETEMIR 100 [IU]/ML
65 INJECTION, SOLUTION SUBCUTANEOUS NIGHTLY
Qty: 30 EACH | Refills: 3 | Status: SHIPPED | OUTPATIENT
Start: 2023-03-03

## 2023-03-03 NOTE — TELEPHONE ENCOUNTER
[facility] pharmacy has a questions regarding Levemir Flexpen - states there are new guidelines as 65 units is not an available dose. The max that can be released is 60 units. Please call. Thank you.

## 2023-03-03 NOTE — TELEPHONE ENCOUNTER
Refill passed per [place] [employer], [facility] protocol. Requested Prescriptions   Pending Prescriptions Disp Refills    lisinopril 30 MG Oral Tab [Pharmacy Med Name: LISINOPRIL 30 MG TABLET] 90 tablet 3     Sig: Take 1 tablet (30 mg total) by mouth daily.        Hypertensive Medications Protocol Passed - 3/3/2023 12:48 AM        Passed - In person appointment in the past 12 or next 3 months     Recent Outpatient Visits              3 weeks ago Essential hypertension, benign    [address], Mindy Corona MD    Office Visit    1 month ago Uncontrolled type 2 diabetes mellitus with hyperglycemia [facility])    [facility] [address], Mustapha Warner MD    Office Visit    2 months ago Right hip pain    [address] [place], [place] [address], Mindy Corona MD    Office Visit    5 months ago Uncontrolled type 2 diabetes mellitus with hyperglycemia [facility])    [facility] [address], Mustapha Warner MD    Office Visit    6 months ago Mixed hyperlipidemia    [address] [place], [place] [address], Bruno Hooper, Roque Shaver MD    Office Visit          Future Appointments         Provider Department Appt Notes    In 2 weeks Delroy Vega MD [place], [address], [place] hip pain    In 2 months Ravinder Reid MD [facility]/u    In 5 months Anish Fernandez MD [facility], [place] 6 mnth               Passed - Last BP reading less than 140/90     BP Readings from Last 1 Encounters:  02/08/23 : 111/67              Passed - CMP or BMP in past 6 months     Recent Results (from the past 4392 hour(s))   COMP METABOLIC PANEL (14)    Collection Time: 02/08/23 10:05 AM   Result Value Ref Range    Glucose 180 (H) 70 - 99 mg/dL    Sodium 143 136 - 145 mmol/L    Potassium 3.5 3.5 - 5.1 mmol/L    Chloride 109 98 - 112 mmol/L    CO2 28.0 21.0 - 32.0 mmol/L    Anion Gap 6 0 - 18 mmol/L    BUN 21 (H) 7 - 18 mg/dL    Creatinine 0.96 0.55 - 1.02 mg/dL    BUN/CREA Ratio 21.9 (H) 10.0 - 20.0    Calcium, Total 9.8 8.5 - 10.1 mg/dL    Calculated Osmolality 304 (H) 275 - 295 mOsm/kg    eGFR-Cr 64 >=60 mL/min/1.73m2    ALT 24 13 - 56 U/L    AST 19 15 - 37 U/L    Alkaline Phosphatase 29 (L) 55 - 142 U/L    Bilirubin, Total 0.5 0.1 - 2.0 mg/dL    Total Protein 6.8 6.4 - 8.2 g/dL    Albumin 3.5 3.4 - 5.0 g/dL    Globulin  3.3 2.8 - 4.4 g/dL    A/G Ratio 1.1 1.0 - 2.0    Patient Fasting for CMP? Yes      *Note: Due to a large number of results and/or encounters for the requested time period, some results have not been displayed. A complete set of results can be found in Results Review.                Passed - In person appointment or virtual visit in the past 6 months     Recent Outpatient Visits              3 weeks ago Essential hypertension, benign    [address], Margot Siddiqi MD    Office Visit    1 month ago Uncontrolled type 2 diabetes mellitus with hyperglycemia [facility])    [address], Ariana Pham MD    Office Visit    2 months ago Right hip pain    [address], Margot Siddiqi MD    Office Visit    5 months ago Uncontrolled type 2 diabetes mellitus with hyperglycemia [facility])    [address], Ariana Pham MD    Office Visit    6 months ago Mixed hyperlipidemia    [address], Vinay Crowe, Cb Terry MD    Office Visit          Future Appointments         Provider Department Appt Notes    In 2 weeks MD Mamadou Baldwin, Demet[place] hip pain    In 2 months MD [address], [place] 4 month f/u    In 5 months MD [address], [place] 6 m[place]               Passed - EGFRCR or GFRNAA > 50     GFR Evaluation  EGFRCR: 64 , resulted on 2/8/2023                Recent Outpatient Visits              3 weeks ago Essential hypertension, benign    Nitza Jacques MD    Office Visit    1 month ago Uncontrolled type 2 diabetes mellitus with hyperglycemia [facility])    Eulalio Jacques MD    Office Visit    2 months ago Right hip pain    Nitza Jacques MD    Office Visit    5 months ago Uncontrolled type 2 diabetes mellitus with hyperglycemia [facility])    Eulalio Jacques MD    Office Visit    6 months ago Mixed hyperlipidemia    Nitza Jacques MD    Office Visit            Future Appointments         Provider Department Appt Notes    In 2 weeks Pearl Mayer MD [facility], [address], [place] hip pain    In 2 months MD Brittney Kirk Addison 4 month f/u    In 5 months MD Brittney Jimenes Addison [facility]

## 2023-03-06 NOTE — TELEPHONE ENCOUNTER
Ada - pharmacist  Malgorzata Johnson she was not the pharmacist helping the patient and said she's really not sure why there was a call last week to change script as patient can just inject twice to get the TDD she needs. Per Pearl, nothing is really needed from the office at this time and apologizes that there's really no communication between pharmacists there. Per Riri Lennon, she will also instruct the patient to inject twice. RN called patient and discussed the situation. Pt voiced understanding  And instructed her to contact the office for further issues.

## 2023-03-17 ENCOUNTER — HOSPITAL ENCOUNTER (OUTPATIENT)
Dept: GENERAL RADIOLOGY | Facility: HOSPITAL | Age: 68
Discharge: HOME | End: 2023-03-17
Attending: ORTHOPAEDIC SURGERY
Payer: MEDICARE

## 2023-03-17 ENCOUNTER — OFFICE VISIT (OUTPATIENT)
Dept: ORTHOPEDICS CLINIC | Facility: CLINIC | Age: 68
End: 2023-03-17

## 2023-03-17 VITALS
HEIGHT: 60.24 IN | HEART RATE: 74 BPM | WEIGHT: 205 LBS | SYSTOLIC BLOOD PRESSURE: 135 MMHG | DIASTOLIC BLOOD PRESSURE: 83 MMHG | BODY MASS INDEX: 39.72 KG/M2

## 2023-03-17 DIAGNOSIS — M17.0 PRIMARY OSTEOARTHRITIS OF BOTH KNEES: Primary | ICD-10-CM

## 2023-03-17 DIAGNOSIS — M25.561 PAIN IN BOTH KNEES, UNSPECIFIED CHRONICITY: ICD-10-CM

## 2023-03-17 DIAGNOSIS — M25.562 PAIN IN BOTH KNEES, UNSPECIFIED CHRONICITY: ICD-10-CM

## 2023-03-17 PROCEDURE — 1126F AMNT PAIN NOTED NONE PRSNT: CPT | Performed by: ORTHOPAEDIC SURGERY

## 2023-03-17 PROCEDURE — 99213 OFFICE O/P EST LOW 20 MIN: CPT | Performed by: ORTHOPAEDIC SURGERY

## 2023-03-17 PROCEDURE — 73564 X-RAY EXAM KNEE 4 OR MORE: CPT | Performed by: ORTHOPAEDIC SURGERY

## 2023-03-17 PROCEDURE — 20610 DRAIN/INJ JOINT/BURSA W/O US: CPT

## 2023-03-17 RX ORDER — TRIAMCINOLONE ACETONIDE 40 MG/ML
40 INJECTION, SUSPENSION INTRA-ARTICULAR; INTRAMUSCULAR ONCE
Status: COMPLETED | OUTPATIENT
Start: 2023-03-17 | End: 2023-03-17

## 2023-03-17 RX ADMIN — TRIAMCINOLONE ACETONIDE 40 MG: 40 INJECTION, SUSPENSION INTRA-ARTICULAR; INTRAMUSCULAR at 09:35:00

## 2023-03-17 NOTE — PROGRESS NOTES
Per verbal order from Dr. Андрей Holden draw up 3ml of 0.5% Marcaine & 2ml 1% lidocaine and 1ml of Kenalog 40 for cortisone injection to right knee. Martha Waller MA    Patient provided education handout for cortisone injection.

## 2023-04-13 ENCOUNTER — NURSE TRIAGE (OUTPATIENT)
Dept: FAMILY MEDICINE CLINIC | Facility: CLINIC | Age: 68
End: 2023-04-13

## 2023-04-14 ENCOUNTER — APPOINTMENT (OUTPATIENT)
Dept: CT IMAGING | Facility: HOSPITAL | Age: 68
End: 2023-04-14
Attending: EMERGENCY MEDICINE
Payer: MEDICARE

## 2023-04-14 ENCOUNTER — HOSPITAL ENCOUNTER (EMERGENCY)
Facility: HOSPITAL | Age: 68
Discharge: HOME | End: 2023-04-14
Attending: EMERGENCY MEDICINE
Payer: MEDICARE

## 2023-04-14 ENCOUNTER — HOSPITAL ENCOUNTER (OUTPATIENT)
Age: 68
Discharge: ANOTHER HOSPITAL | End: 2023-04-14
Payer: MEDICARE

## 2023-04-14 VITALS
SYSTOLIC BLOOD PRESSURE: 158 MMHG | TEMPERATURE: 98 F | WEIGHT: 203 LBS | DIASTOLIC BLOOD PRESSURE: 68 MMHG | BODY MASS INDEX: 39.85 KG/M2 | RESPIRATION RATE: 20 BRPM | OXYGEN SATURATION: 99 % | HEIGHT: 60 IN | HEART RATE: 84 BPM

## 2023-04-14 VITALS
RESPIRATION RATE: 18 BRPM | WEIGHT: 205 LBS | BODY MASS INDEX: 40 KG/M2 | HEART RATE: 84 BPM | OXYGEN SATURATION: 98 % | DIASTOLIC BLOOD PRESSURE: 78 MMHG | SYSTOLIC BLOOD PRESSURE: 124 MMHG | TEMPERATURE: 98 F

## 2023-04-14 DIAGNOSIS — R10.9 ABDOMINAL PAIN, ACUTE: Primary | ICD-10-CM

## 2023-04-14 DIAGNOSIS — S39.012A BACK STRAIN, INITIAL ENCOUNTER: ICD-10-CM

## 2023-04-14 LAB
ALBUMIN SERPL-MCNC: 3.5 G/DL (ref 3.4–5)
ALBUMIN/GLOB SERPL: 1.1 {RATIO} (ref 1–2)
ALP LIVER SERPL-CCNC: 30 U/L
ALT SERPL-CCNC: 22 U/L
ANION GAP SERPL CALC-SCNC: 7 MMOL/L (ref 0–18)
AST SERPL-CCNC: 21 U/L (ref 15–37)
BASOPHILS # BLD AUTO: 0.03 X10(3) UL (ref 0–0.2)
BASOPHILS NFR BLD AUTO: 0.3 %
BILIRUB SERPL-MCNC: 0.6 MG/DL (ref 0.1–2)
BILIRUB UR QL: NEGATIVE
BUN BLD-MCNC: 20 MG/DL (ref 7–18)
BUN/CREAT SERPL: 22.7 (ref 10–20)
CALCIUM BLD-MCNC: 9.6 MG/DL (ref 8.5–10.1)
CHLORIDE SERPL-SCNC: 110 MMOL/L (ref 98–112)
CLARITY UR: CLEAR
CO2 SERPL-SCNC: 24 MMOL/L (ref 21–32)
COLOR UR: YELLOW
CREAT BLD-MCNC: 0.88 MG/DL
DEPRECATED RDW RBC AUTO: 41 FL (ref 35.1–46.3)
EOSINOPHIL # BLD AUTO: 0.04 X10(3) UL (ref 0–0.7)
EOSINOPHIL NFR BLD AUTO: 0.4 %
ERYTHROCYTE [DISTWIDTH] IN BLOOD BY AUTOMATED COUNT: 12.5 % (ref 11–15)
GFR SERPLBLD BASED ON 1.73 SQ M-ARVRAT: 71 ML/MIN/1.73M2 (ref 60–?)
GLOBULIN PLAS-MCNC: 3.1 G/DL (ref 2.8–4.4)
GLUCOSE BLD-MCNC: 201 MG/DL (ref 70–99)
GLUCOSE UR-MCNC: 50 MG/DL
HCT VFR BLD AUTO: 36.5 %
HGB BLD-MCNC: 11.9 G/DL
HGB UR QL STRIP.AUTO: NEGATIVE
IMM GRANULOCYTES # BLD AUTO: 0.05 X10(3) UL (ref 0–1)
IMM GRANULOCYTES NFR BLD: 0.5 %
KETONES UR-MCNC: NEGATIVE MG/DL
LEUKOCYTE ESTERASE UR QL STRIP.AUTO: 25
LIPASE SERPL-CCNC: 51 U/L (ref 13–75)
LYMPHOCYTES # BLD AUTO: 2.55 X10(3) UL (ref 1–4)
LYMPHOCYTES NFR BLD AUTO: 24.5 %
MCH RBC QN AUTO: 29.6 PG (ref 26–34)
MCHC RBC AUTO-ENTMCNC: 32.6 G/DL (ref 31–37)
MCV RBC AUTO: 90.8 FL
MONOCYTES # BLD AUTO: 0.44 X10(3) UL (ref 0.1–1)
MONOCYTES NFR BLD AUTO: 4.2 %
NEUTROPHILS # BLD AUTO: 7.31 X10 (3) UL (ref 1.5–7.7)
NEUTROPHILS # BLD AUTO: 7.31 X10(3) UL (ref 1.5–7.7)
NEUTROPHILS NFR BLD AUTO: 70.1 %
NITRITE UR QL STRIP.AUTO: NEGATIVE
OSMOLALITY SERPL CALC.SUM OF ELEC: 300 MOSM/KG (ref 275–295)
PH UR: 5 [PH] (ref 5–8)
PLATELET # BLD AUTO: 339 10(3)UL (ref 150–450)
POTASSIUM SERPL-SCNC: 3.8 MMOL/L (ref 3.5–5.1)
PROT SERPL-MCNC: 6.6 G/DL (ref 6.4–8.2)
PROT UR-MCNC: 20 MG/DL
RBC # BLD AUTO: 4.02 X10(6)UL
SODIUM SERPL-SCNC: 141 MMOL/L (ref 136–145)
SP GR UR STRIP: >1.03 (ref 1–1.03)
UROBILINOGEN UR STRIP-ACNC: NORMAL
WBC # BLD AUTO: 10.4 X10(3) UL (ref 4–11)

## 2023-04-14 PROCEDURE — 99285 EMERGENCY DEPT VISIT HI MDM: CPT

## 2023-04-14 PROCEDURE — 99214 OFFICE O/P EST MOD 30 MIN: CPT | Performed by: NURSE PRACTITIONER

## 2023-04-14 PROCEDURE — 87086 URINE CULTURE/COLONY COUNT: CPT | Performed by: EMERGENCY MEDICINE

## 2023-04-14 PROCEDURE — 99284 EMERGENCY DEPT VISIT MOD MDM: CPT

## 2023-04-14 PROCEDURE — 74177 CT ABD & PELVIS W/CONTRAST: CPT | Performed by: EMERGENCY MEDICINE

## 2023-04-14 PROCEDURE — 81001 URINALYSIS AUTO W/SCOPE: CPT | Performed by: EMERGENCY MEDICINE

## 2023-04-14 PROCEDURE — 83690 ASSAY OF LIPASE: CPT | Performed by: EMERGENCY MEDICINE

## 2023-04-14 PROCEDURE — 80053 COMPREHEN METABOLIC PANEL: CPT | Performed by: EMERGENCY MEDICINE

## 2023-04-14 PROCEDURE — 36415 COLL VENOUS BLD VENIPUNCTURE: CPT

## 2023-04-14 PROCEDURE — 85025 COMPLETE CBC W/AUTO DIFF WBC: CPT | Performed by: EMERGENCY MEDICINE

## 2023-04-14 RX ORDER — ONDANSETRON 4 MG/1
4 TABLET, ORALLY DISINTEGRATING ORAL EVERY 4 HOURS PRN
Qty: 10 TABLET | Refills: 0 | Status: SHIPPED | OUTPATIENT
Start: 2023-04-14 | End: 2023-04-21

## 2023-04-14 RX ORDER — CYCLOBENZAPRINE HCL 10 MG
10 TABLET ORAL 3 TIMES DAILY PRN
Qty: 20 TABLET | Refills: 0 | Status: SHIPPED | OUTPATIENT
Start: 2023-04-14 | End: 2023-04-21

## 2023-04-14 NOTE — ED INITIAL ASSESSMENT (HPI)
Pt sent by IC c/o epigastric pain since Monday and right lower back pain that started this am. Pt states she's had N/V/D and belching. Pt denies epigastric pain at this time. Pt denies fevers, urinary symptoms. Pt had one episode of blood in stool Monday. Pt is not on blood thinners.

## 2023-05-03 RX ORDER — METFORMIN HYDROCHLORIDE 500 MG/1
500 TABLET, EXTENDED RELEASE ORAL 2 TIMES DAILY WITH MEALS
Qty: 180 TABLET | Refills: 3 | Status: SHIPPED | OUTPATIENT
Start: 2023-05-03

## 2023-05-03 NOTE — TELEPHONE ENCOUNTER
Refill passed per [employer] protocol    Requested Prescriptions   Pending Prescriptions Disp Refills    METFORMIN  MG Oral Tablet 24 Hr [Pharmacy Med Name: METFORMIN HCL  MG TABLET] 180 tablet 2     Sig: TAKE 1 TABLET BY MOUTH TWICE A DAY WITH MEALS       Diabetes Medication Protocol Passed - 5/3/2023  1:00 AM        Passed - Last A1C < 7.5 and within past 6 months     Lab Results   Component Value Date    A1C 7.2 (A) 2023               Passed - In person appointment or virtual visit in the past 6 mos or appointment in next 3 mos     Recent Outpatient Visits              1 month ago Primary osteoarthritis of both Terrye Clamp, [address], Lor Souza MD    Office Visit    2 months ago Essential hypertension, benign    [address] [place], [place] [address]Rosa Maria MD    Office Visit    3 months ago Uncontrolled type 2 diabetes mellitus with hyperglycemia [facility])    [address] [place], [place] [address]Ada MD    Office Visit    4 months ago Right hip pain    [address] [place], [place] [address]Rosa Maria MD    Office Visit    7 months ago Uncontrolled type 2 diabetes mellitus with hyperglycemia [facility])    [address] [place] [address] Ada Mayes MD    Office Visit          Future Appointments         Provider Department Appt Notes    In 4 weeks Susi Godinez MD [address] [place], [place] [address]Gee 4 month f/u    In 3 months Yeni Sullivan MD [address] [place], [place] [address]G[place] 6 mnth               Passed - [facility] or GFRNAA > 50     GFR Evaluation  EGFRCR: 71 , resulted on 2023            Passed - GFR in the past 12 months

## 2023-05-15 NOTE — TELEPHONE ENCOUNTER
Refills on file:     Disp Refills Start End    Insulin Pen Needle 32G X 6 MM Does not apply Misc 400 each 3 12/5/2022     Sig: USE 4 TIMES A DAY    Sent to pharmacy as: Insulin Pen Needle 32G X 6 MM    Notes to Pharmacy: Pt is insulin dependent. Diagnosis code is E11.9. E-Prescribing Status: Receipt confirmed by pharmacy (12/5/2022  4:41 PM CST)    Pharmacy  [facility] [id number] [place] [facility] [address]  [id number] [place] [address]. [phone], [phone]

## 2023-05-29 RX ORDER — AMLODIPINE BESYLATE 5 MG/1
5 TABLET ORAL DAILY
Qty: 90 TABLET | Refills: 3 | Status: SHIPPED | OUTPATIENT
Start: 2023-05-29

## 2023-05-29 RX ORDER — PANTOPRAZOLE SODIUM 40 MG/1
40 TABLET, DELAYED RELEASE ORAL DAILY
Qty: 90 TABLET | Refills: 3 | Status: SHIPPED | OUTPATIENT
Start: 2023-05-29

## 2023-05-30 NOTE — TELEPHONE ENCOUNTER
Refill passed per [place] [employer], [facility] protocol.   Requested Prescriptions   Pending Prescriptions Disp Refills    PANTOPRAZOLE 40 MG Oral Tab EC [Pharmacy Med Name: PANTOPRAZOLE SOD DR 40 MG TAB] 90 tablet 1     Sig: TAKE 1 TABLET BY MOUTH EVERY DAY       Gastrointestional Medication Protocol Passed - 5/29/2023 12:39 AM        Passed - In person appointment or virtual visit in the past 12 mos or appointment in next 3 mos     Recent Outpatient Visits              2 months ago Primary osteoarthritis of both Peyton Lincoln, [address], Devin Souza MD    Office Visit    3 months ago Essential hypertension, benign    Margot Rosado MD    Office Visit    3 months ago Uncontrolled type 2 diabetes mellitus with hyperglycemia [facility])    Ariaan Rosado MD    Office Visit    5 months ago Right hip pain    Margot Rosado MD    Office Visit    8 months ago Uncontrolled type 2 diabetes mellitus with hyperglycemia [facility])    Ariana Rosado MD    Office Visit          Future Appointments         Provider Department Appt Notes    In 2 days MD Ayan Vu Addison 4 month f/u    In 2 months Bridger Roman MD [address], [address], [place] 6 mnth                 AMLODIPINE 5 MG Oral Tab [Pharmacy Med Name: AMLODIPINE BESYLATE 5 MG TAB] 90 tablet 1     Sig: TAKE 1 TABLET BY MOUTH EVERY DAY       Hypertensive Medications Protocol Passed - 5/29/2023 12:39 AM        Passed - In person appointment in the past 12 or next 3 months     Recent Outpatient Visits              2 months ago Primary osteoarthritis of both Aleksandar Daly, Devin Souza MD    Office Visit    3 months ago Essential hypertension, benign    Aly Wagner MD    Office Visit    3 months ago Uncontrolled type 2 diabetes mellitus with hyperglycemia [facility])    Blanca Wagner MD    Office Visit    5 months ago Right hip pain    Aly Wagner MD    Office Visit    8 months ago Uncontrolled type 2 diabetes mellitus with hyperglycemia [facility])    [address], [address], Blanca Watt MD    Office Visit          Future Appointments         Provider Department Appt Notes    In 2 days MD Caleb Nava Addison 4 month f/u    In 2 months MD Caleb Hudson [place] 6 mnth               Passed - Last BP reading less than 140/90     BP Readings from Last 1 Encounters:  04/14/23 : 124/78                Passed - CMP or BMP in past 6 months     Recent Results (from the past 4392 hour(s))   Comp Metabolic Panel (14)    Collection Time: 04/14/23 11:09 AM   Result Value Ref Range    Glucose 201 (H) 70 - 99 mg/dL    Sodium 141 136 - 145 mmol/L    Potassium 3.8 3.5 - 5.1 mmol/L    Chloride 110 98 - 112 mmol/L    CO2 24.0 21.0 - 32.0 mmol/L    Anion Gap 7 0 - 18 mmol/L    BUN 20 (H) 7 - 18 mg/dL    Creatinine 0.88 0.55 - 1.02 mg/dL    BUN/CREA Ratio 22.7 (H) 10.0 - 20.0    Calcium, Total 9.6 8.5 - 10.1 mg/dL    Calculated Osmolality 300 (H) 275 - 295 mOsm/kg    eGFR-Cr 71 >=60 mL/min/1.73m2    ALT 22 13 - 56 U/L    AST 21 15 - 37 U/L    Alkaline Phosphatase 30 (L) 55 - 142 U/L    Bilirubin, Total 0.6 0.1 - 2.0 mg/dL    Total Protein 6.6 6.4 - 8.2 g/dL    Albumin 3.5 3.4 - 5.0 g/dL    Globulin  3.1 2.8 - 4.4 g/dL    A/G Ratio 1.1 1.0 - 2.0     *Note: Due to a large number of results and/or encounters for the requested time period, some results have not been displayed.  A complete set of results can be found in Results Review.                  Passed - In person appointment or virtual visit in the past 6 months     Recent Outpatient Visits              2 months ago Primary osteoarthritis of both Harley Day Ballard Becket, MD    Office Visit    3 months ago Essential hypertension, benign    Luis A Freeman MD    Office Visit    3 months ago Uncontrolled type 2 diabetes mellitus with hyperglycemia [facility])    Luiz Freeman MD    Office Visit    5 months ago Right hip pain    Luis A Freeman MD    Office Visit    8 months ago Uncontrolled type 2 diabetes mellitus with hyperglycemia [facility])    Luiz Freeman MD    Office Visit          Future Appointments         Provider Department Appt Notes    In 2 days MD Pattie Yanes Addison 4 month f/u    In 2 months MD Pattie Quispe [place] 6 mnth               Passed - [facility] or GFRNAA > 50     GFR Evaluation  EGFRCR: 71 , resulted on 4/14/2023               Recent Outpatient Visits              2 months ago Primary osteoarthritis of both Noreen Clarkmayda, [address], Josette Souza MD    Office Visit    3 months ago Essential hypertension, benign    Luis A Freeman MD    Office Visit    3 months ago Uncontrolled type 2 diabetes mellitus with hyperglycemia [facility])    Luiz Freeman MD    Office Visit    5 months ago Right hip pain    Luis A Freeman MD    Office Visit    8 months ago Uncontrolled type 2 diabetes mellitus with hyperglycemia [facility])    Donald Peck MD    Office Visit          Future Appointments         Provider Department Appt Notes    In 2 days MD Tori Castellanos Addison 4 month f/u    In 2 months MD Tori Draper [address] [place] [address]

## 2023-05-31 ENCOUNTER — MED REC SCAN ONLY (OUTPATIENT)
Dept: FAMILY MEDICINE CLINIC | Facility: CLINIC | Age: 68
End: 2023-05-31

## 2023-05-31 ENCOUNTER — OFFICE VISIT (OUTPATIENT)
Dept: ENDOCRINOLOGY CLINIC | Facility: CLINIC | Age: 68
End: 2023-05-31

## 2023-05-31 VITALS
HEART RATE: 71 BPM | BODY MASS INDEX: 39 KG/M2 | DIASTOLIC BLOOD PRESSURE: 76 MMHG | WEIGHT: 200 LBS | SYSTOLIC BLOOD PRESSURE: 132 MMHG

## 2023-05-31 DIAGNOSIS — E11.65 UNCONTROLLED TYPE 2 DIABETES MELLITUS WITH HYPERGLYCEMIA (HCC): Primary | ICD-10-CM

## 2023-05-31 LAB
CARTRIDGE LOT#: ABNORMAL NUMERIC
GLUCOSE BLOOD: 216
HEMOGLOBIN A1C: 7.4 % (ref 4.3–5.6)
TEST STRIP LOT #: NORMAL NUMERIC

## 2023-05-31 PROCEDURE — 95251 CONT GLUC MNTR ANALYSIS I&R: CPT | Performed by: INTERNAL MEDICINE

## 2023-05-31 PROCEDURE — 83036 HEMOGLOBIN GLYCOSYLATED A1C: CPT | Performed by: INTERNAL MEDICINE

## 2023-05-31 PROCEDURE — 82947 ASSAY GLUCOSE BLOOD QUANT: CPT | Performed by: INTERNAL MEDICINE

## 2023-05-31 PROCEDURE — 99213 OFFICE O/P EST LOW 20 MIN: CPT | Performed by: INTERNAL MEDICINE

## 2023-05-31 PROCEDURE — 1126F AMNT PAIN NOTED NONE PRSNT: CPT | Performed by: INTERNAL MEDICINE

## 2023-06-05 ENCOUNTER — PATIENT MESSAGE (OUTPATIENT)
Dept: ENDOCRINOLOGY CLINIC | Facility: CLINIC | Age: 68
End: 2023-06-05

## 2023-06-06 ENCOUNTER — PATIENT MESSAGE (OUTPATIENT)
Dept: ENDOCRINOLOGY CLINIC | Facility: CLINIC | Age: 68
End: 2023-06-06

## 2023-06-06 NOTE — TELEPHONE ENCOUNTER
From: Tiara Cadet  To: Mima Reyes MD  Sent: 6/5/2023 7:01 PM CDT  Subject: Need a refill on short pen needles    Hi Dr Tabby Aguilar. Can you please authorize a refill on my short pen needles? The pharmacy denied my request last time. Thanks!

## 2023-06-06 NOTE — TELEPHONE ENCOUNTER
Lan - [facility] [employer]  BD pen needle - covered getting it ready for patient today. They received a script from Dr. Genny Driscoll office today. Responded to patient with this information and advised patient to contact the office prior to running out of this RX so office can send RX under Dr. Roxana Us name.

## 2023-06-09 ENCOUNTER — OFFICE VISIT (OUTPATIENT)
Dept: FAMILY MEDICINE CLINIC | Facility: CLINIC | Age: 68
End: 2023-06-09

## 2023-06-09 VITALS
HEART RATE: 80 BPM | SYSTOLIC BLOOD PRESSURE: 140 MMHG | BODY MASS INDEX: 39.07 KG/M2 | DIASTOLIC BLOOD PRESSURE: 83 MMHG | HEIGHT: 60 IN | WEIGHT: 199 LBS

## 2023-06-09 DIAGNOSIS — E11.9 TYPE 2 DIABETES MELLITUS WITHOUT COMPLICATION, WITH LONG-TERM CURRENT USE OF INSULIN (HCC): ICD-10-CM

## 2023-06-09 DIAGNOSIS — M54.2 NECK PAIN: Primary | ICD-10-CM

## 2023-06-09 DIAGNOSIS — Z79.4 TYPE 2 DIABETES MELLITUS WITHOUT COMPLICATION, WITH LONG-TERM CURRENT USE OF INSULIN (HCC): ICD-10-CM

## 2023-06-09 PROCEDURE — 99214 OFFICE O/P EST MOD 30 MIN: CPT | Performed by: NURSE PRACTITIONER

## 2023-06-09 RX ORDER — CYCLOBENZAPRINE HCL 10 MG
10 TABLET ORAL 3 TIMES DAILY PRN
COMMUNITY
Start: 2023-05-11 | End: 2023-06-12

## 2023-06-09 NOTE — PATIENT INSTRUCTIONS
Ice 20 min 4-6 times per day  Do not use heat on injury  Do not apply ice directly on skin, make certain a thin cloth is between skin and ice pack

## 2023-06-09 NOTE — ASSESSMENT & PLAN NOTE
Continue present management; poor sleep can contribute to elevated bs  Follow up with endo if bs remain elevated.

## 2023-06-09 NOTE — ASSESSMENT & PLAN NOTE
Ice 20 min 4-6 times per day  Do not use heat on injury  Do not apply ice directly on skin, make certain a thin cloth is between skin and ice pack    Tylenol extra strength as directed-avoid nsaids, dawkins 2 inhibitors due to stomach side effect/s, kidney fxn and htn  Take flexeril at hs-use w caution as it can cause dizziness/sleepiness  Refer PT  Please call if symptoms worsen or are not resolving.

## 2023-06-12 ENCOUNTER — HOSPITAL ENCOUNTER (OUTPATIENT)
Dept: GENERAL RADIOLOGY | Age: 68
Discharge: HOME | End: 2023-06-12
Attending: FAMILY MEDICINE
Payer: MEDICARE

## 2023-06-12 ENCOUNTER — OFFICE VISIT (OUTPATIENT)
Dept: FAMILY MEDICINE CLINIC | Facility: CLINIC | Age: 68
End: 2023-06-12

## 2023-06-12 ENCOUNTER — TELEPHONE (OUTPATIENT)
Dept: FAMILY MEDICINE CLINIC | Facility: CLINIC | Age: 68
End: 2023-06-12

## 2023-06-12 VITALS
HEIGHT: 60 IN | WEIGHT: 202 LBS | SYSTOLIC BLOOD PRESSURE: 112 MMHG | BODY MASS INDEX: 39.66 KG/M2 | HEART RATE: 81 BPM | TEMPERATURE: 98 F | DIASTOLIC BLOOD PRESSURE: 77 MMHG

## 2023-06-12 DIAGNOSIS — M54.2 BILATERAL POSTERIOR NECK PAIN: Primary | ICD-10-CM

## 2023-06-12 DIAGNOSIS — R29.898 DECREASED RANGE OF MOTION OF NECK: ICD-10-CM

## 2023-06-12 DIAGNOSIS — I10 ESSENTIAL HYPERTENSION, BENIGN: ICD-10-CM

## 2023-06-12 DIAGNOSIS — E11.9 TYPE 2 DIABETES MELLITUS WITHOUT COMPLICATION, WITH LONG-TERM CURRENT USE OF INSULIN (HCC): ICD-10-CM

## 2023-06-12 DIAGNOSIS — M54.2 BILATERAL POSTERIOR NECK PAIN: ICD-10-CM

## 2023-06-12 DIAGNOSIS — Z79.4 TYPE 2 DIABETES MELLITUS WITHOUT COMPLICATION, WITH LONG-TERM CURRENT USE OF INSULIN (HCC): ICD-10-CM

## 2023-06-12 PROCEDURE — 1125F AMNT PAIN NOTED PAIN PRSNT: CPT | Performed by: FAMILY MEDICINE

## 2023-06-12 PROCEDURE — 99214 OFFICE O/P EST MOD 30 MIN: CPT | Performed by: FAMILY MEDICINE

## 2023-06-12 PROCEDURE — 72050 X-RAY EXAM NECK SPINE 4/5VWS: CPT | Performed by: FAMILY MEDICINE

## 2023-06-12 RX ORDER — NORTRIPTYLINE HYDROCHLORIDE 10 MG/1
10 CAPSULE ORAL NIGHTLY
Qty: 60 CAPSULE | Refills: 2 | Status: SHIPPED | OUTPATIENT
Start: 2023-06-12

## 2023-06-12 NOTE — TELEPHONE ENCOUNTER
Spoke to daughter, April,  (on CHANA, verified patient's name and ). Patient saw Shahla Granados on Friday and is still having severe neck pain despite Ice, Voltaren, Flexeril. Her blood sugars and blood pressure is elevated. 154/84 and blood sugars running in 300's. [place] is off today, scheduled a follow up with Dr. Fabian Espinoza.      Future Appointments   Date Time Provider Valentín Machado   2023 10:30 AM Anna Chavez DO ECADO EC ADO   2023  8:30 AM Codi Spears MD ECADOFM EC ADO   10/18/2023  9:45 AM Kemar Marcelo MD ECADOENDO EC ADO   2023  8:00 AM ADO DEXA RM1 ADO Dexa EM Gee   2023  8:20 AM ADO JULIO RM1 ADO JULIO EM [place]

## 2023-06-13 RX ORDER — INSULIN ASPART 100 [IU]/ML
INJECTION, SOLUTION INTRAVENOUS; SUBCUTANEOUS
Qty: 60 ML | Refills: 0 | Status: SHIPPED | OUTPATIENT
Start: 2023-06-13

## 2023-07-10 RX ORDER — FENOFIBRATE 200 MG/1
200 CAPSULE ORAL
Qty: 90 CAPSULE | Refills: 3 | OUTPATIENT
Start: 2023-07-10

## 2023-07-10 NOTE — TELEPHONE ENCOUNTER
Duplicate request, previously addressed. Disp Refills Start End    fenofibrate micronized 200 MG Oral Cap 90 capsule 3 4/3/2023     Sig - Route: Take 1 capsule (200 mg total) by mouth every morning before breakfast. - Oral    Sent to pharmacy as: Fenofibrate Micronized 200 MG Oral Capsule (LOFIBRA)    E-Prescribing Status: Receipt confirmed by pharmacy (4/3/2023  1:44 PM CDT)      Pharmacy    [address]  [address] [address] [phone], [phone]

## 2023-07-12 ENCOUNTER — OFFICE VISIT (OUTPATIENT)
Dept: PHYSICAL THERAPY | Age: 68
End: 2023-07-12
Attending: NURSE PRACTITIONER
Payer: MEDICARE

## 2023-07-12 DIAGNOSIS — M54.2 NECK PAIN: Primary | ICD-10-CM

## 2023-07-12 PROCEDURE — 97161 PT EVAL LOW COMPLEX 20 MIN: CPT | Performed by: PHYSICAL THERAPIST

## 2023-07-12 PROCEDURE — 97140 MANUAL THERAPY 1/> REGIONS: CPT | Performed by: PHYSICAL THERAPIST

## 2023-07-12 PROCEDURE — 97110 THERAPEUTIC EXERCISES: CPT | Performed by: PHYSICAL THERAPIST

## 2023-07-14 ENCOUNTER — OFFICE VISIT (OUTPATIENT)
Dept: PHYSICAL THERAPY | Age: 68
End: 2023-07-14
Attending: NURSE PRACTITIONER
Payer: MEDICARE

## 2023-07-14 PROCEDURE — 97110 THERAPEUTIC EXERCISES: CPT | Performed by: PHYSICAL THERAPIST

## 2023-07-14 NOTE — PROGRESS NOTES
Diagnosis: Neck pain (M54.2)        Next MD visit: none scheduled  Fall Risk: standard         Precautions: n/a          Medication Changes since last visit?: No      Subjective: States her neck is feeling better. Pt describes pain level 1/10. Objective:  Cervical ROM is minimally limited into all planes. Assessment: Demonstrates improved neck mobility with decreased neck pain. Patient and PT goals are in progress. Plan: Continue PT to improve mobility.       7/14/2023  Visit#: 2/Medicare   Thera Ex   X40min  - supine B shoulder flexion with karen  - cervical spine PROM into B rotation, lateral flexion  - supine forward press with 2lb db's 10 x 2  - supine overhead flexion, horizontal abduction AROM   - instructed on additional HEP  - red theraband shoulder rows and extensions 10 x 2  - yellow theraband B shoulder external rotation  - Sci-fit L1 3min fwd/ 3min bwd                     Charges: EX3       Total Timed Treatment: 40 min  Total Treatment Time: 40 min

## 2023-07-17 ENCOUNTER — APPOINTMENT (OUTPATIENT)
Dept: PHYSICAL THERAPY | Age: 68
End: 2023-07-17
Attending: NURSE PRACTITIONER
Payer: MEDICARE

## 2023-07-18 ENCOUNTER — PATIENT MESSAGE (OUTPATIENT)
Dept: FAMILY MEDICINE CLINIC | Facility: CLINIC | Age: 68
End: 2023-07-18

## 2023-07-19 ENCOUNTER — APPOINTMENT (OUTPATIENT)
Dept: PHYSICAL THERAPY | Age: 68
End: 2023-07-19
Attending: NURSE PRACTITIONER
Payer: MEDICARE

## 2023-07-19 RX ORDER — MELOXICAM 15 MG/1
15 TABLET ORAL DAILY
Qty: 90 TABLET | Refills: 1 | Status: SHIPPED | OUTPATIENT
Start: 2023-07-19

## 2023-07-21 ENCOUNTER — OFFICE VISIT (OUTPATIENT)
Dept: PHYSICAL THERAPY | Age: 68
End: 2023-07-21
Attending: FAMILY MEDICINE
Payer: MEDICARE

## 2023-07-21 PROCEDURE — 97110 THERAPEUTIC EXERCISES: CPT | Performed by: PHYSICAL THERAPIST

## 2023-07-21 NOTE — PROGRESS NOTES
Progress Summary  Pt has attended 4 visits in Physical Therapy. Diagnosis: Neck pain (M54.2)        Next MD visit: none scheduled  Fall Risk: standard         Precautions: n/a          Medication Changes since last visit?: No        Subjective: States her neck is feeling and moving better today. Pt describes pain level 0/10. Objective:  Cervical ROM is minimally limited into all planes. B UE ROM is WFL into all planes    Assessment: Referred to PT for eval and tx due to initial complaints of neck pain. Patient has completed 3 PT visits. Demonstrates improved neck mobility with decreased neck pain. Reports 0/10 neck pain. Cervical and B shoulder ROM is WFL into all planes. Strength is grossly 5/5. Patient and PT goals have been met.     7/21/2023  Visit#: 3/Medicare 7/14/2023  Visit#: 2/Medicare   Thera Ex   X40min  - supine B shoulder flexion with karen  - supine B shoulder horizontal abduction with 10 x 2  - cervical spine PROM into B rotation, lateral flexion  - supine forward press with 2lb db's 10 x 2  - supine overhead flexion, horizontal abduction AROM with 1lb   - cable 5# shoulder rows and extensions 10 x 2  - yellow theraband B shoulder  - Sci-fit L1 3min fwd / 3min bwd  - B shoulder flexion, scaption, overhead press with 1lb db's   X40min  - supine B shoulder flexion with karen  - cervical spine PROM into B rotation, lateral flexion  - supine forward press with 2lb db's 10 x 2  - supine overhead flexion, horizontal abduction AROM   - instructed on additional HEP  - red theraband shoulder rows and extensions 10 x 2  - yellow theraband B shoulder external rotation  - Sci-fit L1 3min fwd/ 3min bwd                       Charges: EX3       Total Timed Treatment: 40 min  Total Treatment Time: 40 min    Neck Disability Index Score  Score: 56 % (6/29/2023  4:58 PM)    Post Neck Disability Index Score  No data recorded  56 % improvement    Plan: Discharged from PT.  Patient will continue with HEP and follow up with MD as needed. Patient/Family/Caregiver was advised of these findings, precautions, and treatment options and has agreed to actively participate in planning and for this course of care. Thank you for your referral. If you have any questions, please contact me at Dept: [phone].     Sincerely,  Electronically signed by therapist: Milvia Garcia., PT

## 2023-07-24 ENCOUNTER — APPOINTMENT (OUTPATIENT)
Dept: PHYSICAL THERAPY | Age: 68
End: 2023-07-24
Attending: NURSE PRACTITIONER
Payer: MEDICARE

## 2023-07-25 ENCOUNTER — NURSE TRIAGE (OUTPATIENT)
Dept: FAMILY MEDICINE CLINIC | Facility: CLINIC | Age: 68
End: 2023-07-25

## 2023-07-25 NOTE — TELEPHONE ENCOUNTER
Action Requested: Summary for Provider     []  Critical Lab, Recommendations Needed  [] Need Additional Advice  []   FYI    []   Need Orders  [] Need Medications Sent to Pharmacy  []  Other     SUMMARY: Per protocol, patient should be seen in the office within 3 days. Patient prefers to be seen in [facility] only. Appointment scheduled. Future Appointments   Date Time Provider Valentín Machado   7/27/2023 11:00 AM Anay Simms DO [facility]   8/9/2023  8:30 AM Gino Gooden MD ECADOFM EC [facility]   10/18/2023  9:45 AM Charolett Simmonds, MD ECADOENDO EC [facility]   11/18/2023  8:00 AM ADO DEXA RM1 ADO Dexa EM [place]   11/18/2023  8:20 AM ADO JULIO RM1 ADO JULIO EM [place]     Reason for call: Rash  Onset: 1 Week Ago    Patient reports of localized rash in the right upper thigh for approximately a week now. She states rash started as \"5 cluttered reddish bumps\". She initially thought it was fungal infection and has been applying lotrimin cream. Rash did not resolve and reports that it looked like blisters and thinks it might be shingles. Denies any other complaints at this time. Advised to schedule an appointment for evaluation. Patient verbalized understanding and agreed with plan of care.      Reason for Disposition   Localized rash present > 7 days    Protocols used: Rash or Redness - [id number]

## 2023-07-26 ENCOUNTER — APPOINTMENT (OUTPATIENT)
Dept: PHYSICAL THERAPY | Age: 68
End: 2023-07-26
Attending: NURSE PRACTITIONER
Payer: MEDICARE

## 2023-07-27 ENCOUNTER — OFFICE VISIT (OUTPATIENT)
Dept: FAMILY MEDICINE CLINIC | Facility: CLINIC | Age: 68
End: 2023-07-27

## 2023-07-27 VITALS
HEART RATE: 74 BPM | SYSTOLIC BLOOD PRESSURE: 117 MMHG | WEIGHT: 200.19 LBS | HEIGHT: 60 IN | BODY MASS INDEX: 39.3 KG/M2 | DIASTOLIC BLOOD PRESSURE: 72 MMHG | TEMPERATURE: 98 F

## 2023-07-27 DIAGNOSIS — B02.9 HERPES ZOSTER WITHOUT COMPLICATION: Primary | ICD-10-CM

## 2023-07-27 PROCEDURE — 1125F AMNT PAIN NOTED PAIN PRSNT: CPT | Performed by: FAMILY MEDICINE

## 2023-07-27 PROCEDURE — 99213 OFFICE O/P EST LOW 20 MIN: CPT | Performed by: FAMILY MEDICINE

## 2023-07-27 RX ORDER — PREDNISONE 20 MG/1
TABLET ORAL
Qty: 10 TABLET | Refills: 0 | Status: SHIPPED | OUTPATIENT
Start: 2023-07-27

## 2023-07-27 RX ORDER — VALACYCLOVIR HYDROCHLORIDE 1 G/1
TABLET, FILM COATED ORAL
Qty: 21 TABLET | Refills: 0 | Status: SHIPPED | OUTPATIENT
Start: 2023-07-27

## 2023-07-31 ENCOUNTER — APPOINTMENT (OUTPATIENT)
Dept: PHYSICAL THERAPY | Age: 68
End: 2023-07-31
Attending: NURSE PRACTITIONER
Payer: MEDICARE

## 2023-08-02 ENCOUNTER — APPOINTMENT (OUTPATIENT)
Dept: PHYSICAL THERAPY | Age: 68
End: 2023-08-02
Attending: NURSE PRACTITIONER
Payer: MEDICARE

## 2023-08-09 ENCOUNTER — LAB ENCOUNTER (OUTPATIENT)
Dept: LAB | Age: 68
End: 2023-08-09
Attending: FAMILY MEDICINE
Payer: MEDICARE

## 2023-08-09 ENCOUNTER — OFFICE VISIT (OUTPATIENT)
Dept: FAMILY MEDICINE CLINIC | Facility: CLINIC | Age: 68
End: 2023-08-09

## 2023-08-09 VITALS
WEIGHT: 202.19 LBS | SYSTOLIC BLOOD PRESSURE: 132 MMHG | BODY MASS INDEX: 39 KG/M2 | HEART RATE: 70 BPM | DIASTOLIC BLOOD PRESSURE: 83 MMHG

## 2023-08-09 DIAGNOSIS — E78.2 MIXED HYPERLIPIDEMIA: ICD-10-CM

## 2023-08-09 DIAGNOSIS — E11.9 TYPE 2 DIABETES MELLITUS WITHOUT COMPLICATION, WITH LONG-TERM CURRENT USE OF INSULIN (HCC): Primary | ICD-10-CM

## 2023-08-09 DIAGNOSIS — I10 ESSENTIAL HYPERTENSION, BENIGN: ICD-10-CM

## 2023-08-09 DIAGNOSIS — Z79.4 TYPE 2 DIABETES MELLITUS WITHOUT COMPLICATION, WITH LONG-TERM CURRENT USE OF INSULIN (HCC): Primary | ICD-10-CM

## 2023-08-09 LAB
ALBUMIN SERPL-MCNC: 3.5 G/DL (ref 3.4–5)
ALBUMIN/GLOB SERPL: 1.1 {RATIO} (ref 1–2)
ALP LIVER SERPL-CCNC: 35 U/L
ALT SERPL-CCNC: 32 U/L
ANION GAP SERPL CALC-SCNC: 5 MMOL/L (ref 0–18)
AST SERPL-CCNC: 25 U/L (ref 15–37)
BILIRUB SERPL-MCNC: 0.5 MG/DL (ref 0.1–2)
BUN BLD-MCNC: 19 MG/DL (ref 7–18)
CALCIUM BLD-MCNC: 9.4 MG/DL (ref 8.5–10.1)
CHLORIDE SERPL-SCNC: 107 MMOL/L (ref 98–112)
CHOLEST SERPL-MCNC: 129 MG/DL (ref ?–200)
CO2 SERPL-SCNC: 28 MMOL/L (ref 21–32)
CREAT BLD-MCNC: 0.94 MG/DL
EGFRCR SERPLBLD CKD-EPI 2021: 65 ML/MIN/1.73M2 (ref 60–?)
FASTING PATIENT LIPID ANSWER: YES
FASTING STATUS PATIENT QL REPORTED: YES
GLOBULIN PLAS-MCNC: 3.1 G/DL (ref 2.8–4.4)
GLUCOSE BLD-MCNC: 176 MG/DL (ref 70–99)
HDLC SERPL-MCNC: 55 MG/DL (ref 40–59)
LDLC SERPL CALC-MCNC: 50 MG/DL (ref ?–100)
NONHDLC SERPL-MCNC: 74 MG/DL (ref ?–130)
OSMOLALITY SERPL CALC.SUM OF ELEC: 297 MOSM/KG (ref 275–295)
POTASSIUM SERPL-SCNC: 4.2 MMOL/L (ref 3.5–5.1)
PROT SERPL-MCNC: 6.6 G/DL (ref 6.4–8.2)
SODIUM SERPL-SCNC: 140 MMOL/L (ref 136–145)
TRIGL SERPL-MCNC: 142 MG/DL (ref 30–149)
TSI SER-ACNC: 2.32 MIU/ML (ref 0.36–3.74)
VLDLC SERPL CALC-MCNC: 20 MG/DL (ref 0–30)

## 2023-08-09 PROCEDURE — 80061 LIPID PANEL: CPT

## 2023-08-09 PROCEDURE — 1126F AMNT PAIN NOTED NONE PRSNT: CPT | Performed by: FAMILY MEDICINE

## 2023-08-09 PROCEDURE — 84443 ASSAY THYROID STIM HORMONE: CPT

## 2023-08-09 PROCEDURE — 99214 OFFICE O/P EST MOD 30 MIN: CPT | Performed by: FAMILY MEDICINE

## 2023-08-09 PROCEDURE — 36415 COLL VENOUS BLD VENIPUNCTURE: CPT

## 2023-08-09 PROCEDURE — 80053 COMPREHEN METABOLIC PANEL: CPT

## 2023-08-30 ENCOUNTER — TELEPHONE (OUTPATIENT)
Dept: ENDOCRINOLOGY CLINIC | Facility: CLINIC | Age: 68
End: 2023-08-30

## 2023-09-05 RX ORDER — INSULIN ASPART 100 [IU]/ML
INJECTION, SOLUTION INTRAVENOUS; SUBCUTANEOUS
Qty: 60 ML | Refills: 0 | Status: SHIPPED | OUTPATIENT
Start: 2023-09-05

## 2023-09-06 DIAGNOSIS — R29.898 DECREASED RANGE OF MOTION OF NECK: ICD-10-CM

## 2023-09-06 DIAGNOSIS — M54.2 BILATERAL POSTERIOR NECK PAIN: ICD-10-CM

## 2023-09-06 RX ORDER — NORTRIPTYLINE HYDROCHLORIDE 10 MG/1
20 CAPSULE ORAL NIGHTLY
Qty: 180 CAPSULE | Refills: 1 | Status: SHIPPED | OUTPATIENT
Start: 2023-09-06 | End: 2023-09-07

## 2023-09-06 NOTE — TELEPHONE ENCOUNTER
Please review. Protocol failed / Has no protocol. Requested Prescriptions   Pending Prescriptions Disp Refills    NORTRIPTYLINE 10 MG Oral Cap [Pharmacy Med Name: NORTRIPTYLINE HCL 10 MG CAP] 180 capsule 0     Sig: TAKE 1 CAPSULE BY MOUTH NIGHTLY. IF AFTER 1 WEEK STILL IN PAIN START TAKING 2 AT NIGHT. There is no refill protocol information for this order        Future Appointments         Provider Department Appt Notes    In 1 month Tiffany Knox MD [address] [facility], [place] Return in about 4 months (around 9/30/2023).     In 2 months ADO JULIO RM1; ADO DEXA [address] [facility] [address]     In 2 months ADO DEXA RM1; ADO JULIO [address] [facility]     In 5 months Kaity Torres MD [address], [address], [place] 6 mmnth           Recent Outpatient Visits              4 weeks ago Type 2 diabetes mellitus without complication, with long-term current use of insulin [facility])    [address], [address], Angel Nathan MD    Office Visit    1 month ago Herpes zoster without complication    [facility], [address], [place], DO    Office Visit    1 month ago     [facility] in [place] [address]., Corrie Stanley, PT    Office Visit    1 month ago     [facility] in [place] [address] Corrie Stanley, PT    Office Visit    1 month ago Neck pain    [facility] in [place] [address]., Corrie Stanley, PT    Office Visit

## 2023-09-10 NOTE — TELEPHONE ENCOUNTER
Please review; protocol failed. Requested Prescriptions   Pending Prescriptions Disp Refills    ALPRAZolam 0.25 MG Oral Tab 30 tablet 5     Si TABLET 2 TIMES DAILY AS NEEDED       There is no refill protocol information for this order          Future Appointments         Provider Department Appt Notes    In 1 month Shelly Yoon MD [address] [place] [address] Return in about 4 months (around 2023).     In 2 months ADO JULIO RM1; ADO DEXA [address] [facility] [address]     In 2 months ADO DEXA RM1; ADO JULIO [address] [facility]     In 5 months Andrew Krueger MD [address], [address], [address] [facility]            Recent Outpatient Visits              1 month ago Type 2 diabetes mellitus without complication, with long-term current use of insulin [facility])    [address], [address], Ami Castaneda MD    Office Visit    1 month ago Herpes zoster without complication    [facility] [address] [place],     Office Visit    1 month ago     [facility] in [place] [address] Munira Ellison, PT    Office Visit    1 month ago     [facility] in [place] [address] Munira Ellison, PT    Office Visit    2 months ago Neck pain    [facility] in [place] [address] Munira Ellison, PT    Office Visit

## 2023-09-11 RX ORDER — ALPRAZOLAM 0.25 MG/1
TABLET ORAL
Qty: 30 TABLET | Refills: 5 | Status: SHIPPED | OUTPATIENT
Start: 2023-09-11

## 2023-09-18 ENCOUNTER — TELEPHONE (OUTPATIENT)
Dept: ENDOCRINOLOGY CLINIC | Facility: CLINIC | Age: 68
End: 2023-09-18

## 2023-09-18 NOTE — TELEPHONE ENCOUNTER
Received fax from [facility]. Completed attached fax for CGM supplies.  Placed in providers folder

## 2023-09-21 NOTE — TELEPHONE ENCOUNTER
Received signed forms from provider signed folder, faxing forms to [phone].   Awaiting confirmation

## 2023-10-05 NOTE — TELEPHONE ENCOUNTER
LOV: 5/31/23    Future Appointments   Date Time Provider Valentín Machado   10/18/2023  9:45 AM Erich Lion MD [facility]       Refilled per protocol

## 2023-10-18 ENCOUNTER — OFFICE VISIT (OUTPATIENT)
Dept: ENDOCRINOLOGY CLINIC | Facility: CLINIC | Age: 68
End: 2023-10-18
Payer: MEDICARE

## 2023-10-18 VITALS
SYSTOLIC BLOOD PRESSURE: 133 MMHG | WEIGHT: 209 LBS | DIASTOLIC BLOOD PRESSURE: 79 MMHG | BODY MASS INDEX: 41 KG/M2 | HEART RATE: 73 BPM

## 2023-10-18 DIAGNOSIS — E11.65 UNCONTROLLED TYPE 2 DIABETES MELLITUS WITH HYPERGLYCEMIA (HCC): Primary | ICD-10-CM

## 2023-10-18 LAB
CARTRIDGE LOT#: ABNORMAL NUMERIC
GLUCOSE BLOOD: 208
HEMOGLOBIN A1C: 8 % (ref 4.3–5.6)
TEST STRIP LOT #: NORMAL NUMERIC

## 2023-10-18 PROCEDURE — 99214 OFFICE O/P EST MOD 30 MIN: CPT | Performed by: INTERNAL MEDICINE

## 2023-10-18 PROCEDURE — 1126F AMNT PAIN NOTED NONE PRSNT: CPT | Performed by: INTERNAL MEDICINE

## 2023-10-18 PROCEDURE — 83036 HEMOGLOBIN GLYCOSYLATED A1C: CPT | Performed by: INTERNAL MEDICINE

## 2023-10-18 PROCEDURE — 82947 ASSAY GLUCOSE BLOOD QUANT: CPT | Performed by: INTERNAL MEDICINE

## 2023-10-18 NOTE — PATIENT INSTRUCTIONS
Levemir 62 units subcutaneous at bedtime    Novolog   INSULIN SLIDING SCALE  Base Values  Breakfast: 24     Dinner: 26  Ranges:  80-99: -2  100-119: 0  120-139: 0  140-159: 1  160-179: 1  180-199: 2  200-219: 2  220-239: 3  240-259: 3  260-279: 4  280-299: 4  300-319: 5  320-339: 5  340-359: 6  360-379: 6  380-399: 7

## 2023-10-23 ENCOUNTER — TELEPHONE (OUTPATIENT)
Dept: ENDOCRINOLOGY CLINIC | Facility: CLINIC | Age: 68
End: 2023-10-23

## 2023-10-23 RX ORDER — INSULIN ASPART 100 [IU]/ML
INJECTION, SOLUTION INTRAVENOUS; SUBCUTANEOUS
Qty: 60 ML | Refills: 0 | Status: SHIPPED | OUTPATIENT
Start: 2023-10-23

## 2023-10-23 NOTE — TELEPHONE ENCOUNTER
NOVOLOG 100 UNIT/ML FLEXPEN  Sig:  Please include max daily dose  Comments:  Patient says MD increased her dose.

## 2023-11-03 ENCOUNTER — TELEPHONE (OUTPATIENT)
Dept: ENDOCRINOLOGY CLINIC | Facility: CLINIC | Age: 68
End: 2023-11-03

## 2023-11-03 NOTE — TELEPHONE ENCOUNTER
Pt asking for rx for sensors to be sent to [facility] - her rx is on hold there - needs documentation and rx to be faxed

## 2023-11-08 NOTE — TELEPHONE ENCOUNTER
There was an order form that was signed on 9/18/23 for CGM scanned under media tab. Unsure why they are needing another prescription. Reached out to [facility] rep via email to see what exactly is needed. Waiting for response.

## 2023-11-10 ENCOUNTER — PATIENT MESSAGE (OUTPATIENT)
Dept: ENDOCRINOLOGY CLINIC | Facility: CLINIC | Age: 68
End: 2023-11-10

## 2023-11-10 NOTE — TELEPHONE ENCOUNTER
Spoke to representative at [place] [address] who stated that they are trying to do a proactive prescription request for patient. Was advised that our office should fax new order for Holly Xiao 2 sensors and chart notes to them and have patient call tor request a reorder of supplies. Eladio 2 sensor prescription sent to [address] [facility] in [place] and chart notes faxed to [phone]. Called patient to notify. In addition, reached out Prietovenkatesh Rueda and requested if he could follow up on patient's order.

## 2023-11-16 NOTE — TELEPHONE ENCOUNTER
Followed up with Jb from [facility] and RN was told order shipped 11/10/23 and nothing is needed from the office.

## 2023-11-18 ENCOUNTER — HOSPITAL ENCOUNTER (OUTPATIENT)
Dept: BONE DENSITY | Age: 68
Discharge: HOME | End: 2023-11-18
Attending: FAMILY MEDICINE
Payer: MEDICARE

## 2023-11-18 ENCOUNTER — HOSPITAL ENCOUNTER (OUTPATIENT)
Dept: MAMMOGRAPHY | Age: 68
Discharge: HOME | End: 2023-11-18
Attending: FAMILY MEDICINE
Payer: MEDICARE

## 2023-11-18 DIAGNOSIS — Z12.31 VISIT FOR SCREENING MAMMOGRAM: ICD-10-CM

## 2023-11-18 DIAGNOSIS — Z78.0 POST-MENOPAUSAL: ICD-10-CM

## 2023-11-18 PROCEDURE — 77063 BREAST TOMOSYNTHESIS BI: CPT | Performed by: FAMILY MEDICINE

## 2023-11-18 PROCEDURE — 77067 SCR MAMMO BI INCL CAD: CPT | Performed by: FAMILY MEDICINE

## 2023-11-18 PROCEDURE — 77080 DXA BONE DENSITY AXIAL: CPT | Performed by: FAMILY MEDICINE

## 2023-11-21 NOTE — PROGRESS NOTES
Your bone density is normal. No increased risk of fracture.  Continue regular exercise to keep up your bone density. - Dr. William Ng

## 2024-02-12 ENCOUNTER — OFFICE VISIT (OUTPATIENT)
Dept: FAMILY MEDICINE CLINIC | Facility: CLINIC | Age: 69
End: 2024-02-12
Payer: MEDICARE

## 2024-02-12 ENCOUNTER — LAB ENCOUNTER (OUTPATIENT)
Dept: LAB | Age: 69
End: 2024-02-12
Attending: FAMILY MEDICINE
Payer: MEDICARE

## 2024-02-12 VITALS
HEART RATE: 73 BPM | DIASTOLIC BLOOD PRESSURE: 71 MMHG | WEIGHT: 207 LBS | BODY MASS INDEX: 40 KG/M2 | SYSTOLIC BLOOD PRESSURE: 111 MMHG

## 2024-02-12 DIAGNOSIS — I10 ESSENTIAL HYPERTENSION, BENIGN: ICD-10-CM

## 2024-02-12 DIAGNOSIS — Z79.4 TYPE 2 DIABETES MELLITUS WITHOUT COMPLICATION, WITH LONG-TERM CURRENT USE OF INSULIN (HCC): Primary | ICD-10-CM

## 2024-02-12 DIAGNOSIS — E11.9 TYPE 2 DIABETES MELLITUS WITHOUT COMPLICATION, WITH LONG-TERM CURRENT USE OF INSULIN (HCC): ICD-10-CM

## 2024-02-12 DIAGNOSIS — H04.123 DRY EYE SYNDROME OF BILATERAL LACRIMAL GLANDS: ICD-10-CM

## 2024-02-12 DIAGNOSIS — I35.0 NONRHEUMATIC AORTIC VALVE STENOSIS: ICD-10-CM

## 2024-02-12 DIAGNOSIS — E78.2 MIXED HYPERLIPIDEMIA: ICD-10-CM

## 2024-02-12 DIAGNOSIS — E11.9 TYPE 2 DIABETES MELLITUS WITHOUT COMPLICATION, WITH LONG-TERM CURRENT USE OF INSULIN (HCC): Primary | ICD-10-CM

## 2024-02-12 DIAGNOSIS — E66.01 MORBID (SEVERE) OBESITY DUE TO EXCESS CALORIES (HCC): ICD-10-CM

## 2024-02-12 DIAGNOSIS — R22.41 FOOT MASS, RIGHT: ICD-10-CM

## 2024-02-12 DIAGNOSIS — Z00.00 ENCOUNTER FOR ANNUAL HEALTH EXAMINATION: ICD-10-CM

## 2024-02-12 DIAGNOSIS — E55.9 VITAMIN D DEFICIENCY: ICD-10-CM

## 2024-02-12 DIAGNOSIS — Z79.4 TYPE 2 DIABETES MELLITUS WITHOUT COMPLICATION, WITH LONG-TERM CURRENT USE OF INSULIN (HCC): ICD-10-CM

## 2024-02-12 DIAGNOSIS — Z12.31 SCREENING MAMMOGRAM FOR BREAST CANCER: ICD-10-CM

## 2024-02-12 DIAGNOSIS — E11.3291 MILD NONPROLIFERATIVE DIABETIC RETINOPATHY OF RIGHT EYE WITHOUT MACULAR EDEMA ASSOCIATED WITH TYPE 2 DIABETES MELLITUS (HCC): ICD-10-CM

## 2024-02-12 PROBLEM — M54.2 NECK PAIN: Status: RESOLVED | Noted: 2019-09-10 | Resolved: 2024-02-12

## 2024-02-12 PROBLEM — B02.9 HERPES ZOSTER WITHOUT COMPLICATION: Status: RESOLVED | Noted: 2023-07-27 | Resolved: 2024-02-12

## 2024-02-12 LAB
ALBUMIN SERPL-MCNC: 4.1 G/DL (ref 3.2–4.8)
ALBUMIN/GLOB SERPL: 1.6 {RATIO} (ref 1–2)
ALP LIVER SERPL-CCNC: 40 U/L
ALT SERPL-CCNC: 15 U/L
ANION GAP SERPL CALC-SCNC: 6 MMOL/L (ref 0–18)
AST SERPL-CCNC: 22 U/L (ref ?–34)
BASOPHILS # BLD AUTO: 0.04 X10(3) UL (ref 0–0.2)
BASOPHILS NFR BLD AUTO: 0.5 %
BILIRUB SERPL-MCNC: 0.6 MG/DL (ref 0.2–1.1)
BUN BLD-MCNC: 19 MG/DL (ref 9–23)
BUN/CREAT SERPL: 20.2 (ref 10–20)
CALCIUM BLD-MCNC: 9.7 MG/DL (ref 8.7–10.4)
CHLORIDE SERPL-SCNC: 107 MMOL/L (ref 98–112)
CHOLEST SERPL-MCNC: 117 MG/DL (ref ?–200)
CO2 SERPL-SCNC: 28 MMOL/L (ref 21–32)
CREAT BLD-MCNC: 0.94 MG/DL
CREAT UR-SCNC: 176.9 MG/DL
DEPRECATED RDW RBC AUTO: 39.4 FL (ref 35.1–46.3)
EGFRCR SERPLBLD CKD-EPI 2021: 65 ML/MIN/1.73M2 (ref 60–?)
EOSINOPHIL # BLD AUTO: 0.24 X10(3) UL (ref 0–0.7)
EOSINOPHIL NFR BLD AUTO: 3.3 %
ERYTHROCYTE [DISTWIDTH] IN BLOOD BY AUTOMATED COUNT: 12.1 % (ref 11–15)
EST. AVERAGE GLUCOSE BLD GHB EST-MCNC: 186 MG/DL (ref 68–126)
FASTING PATIENT LIPID ANSWER: YES
FASTING STATUS PATIENT QL REPORTED: YES
GLOBULIN PLAS-MCNC: 2.6 G/DL (ref 2.8–4.4)
GLUCOSE BLD-MCNC: 193 MG/DL (ref 70–99)
HBA1C MFR BLD: 8.1 % (ref ?–5.7)
HCT VFR BLD AUTO: 37.2 %
HDLC SERPL-MCNC: 44 MG/DL (ref 40–59)
HGB BLD-MCNC: 12.5 G/DL
IMM GRANULOCYTES # BLD AUTO: 0.05 X10(3) UL (ref 0–1)
IMM GRANULOCYTES NFR BLD: 0.7 %
LDLC SERPL CALC-MCNC: 48 MG/DL (ref ?–100)
LYMPHOCYTES # BLD AUTO: 2.23 X10(3) UL (ref 1–4)
LYMPHOCYTES NFR BLD AUTO: 30.3 %
MCH RBC QN AUTO: 29.9 PG (ref 26–34)
MCHC RBC AUTO-ENTMCNC: 33.6 G/DL (ref 31–37)
MCV RBC AUTO: 89 FL
MICROALBUMIN UR-MCNC: 0.6 MG/DL
MICROALBUMIN/CREAT 24H UR-RTO: 3.4 UG/MG (ref ?–30)
MONOCYTES # BLD AUTO: 0.35 X10(3) UL (ref 0.1–1)
MONOCYTES NFR BLD AUTO: 4.8 %
NEUTROPHILS # BLD AUTO: 4.45 X10 (3) UL (ref 1.5–7.7)
NEUTROPHILS # BLD AUTO: 4.45 X10(3) UL (ref 1.5–7.7)
NEUTROPHILS NFR BLD AUTO: 60.4 %
NONHDLC SERPL-MCNC: 73 MG/DL (ref ?–130)
OSMOLALITY SERPL CALC.SUM OF ELEC: 300 MOSM/KG (ref 275–295)
PLATELET # BLD AUTO: 375 10(3)UL (ref 150–450)
POTASSIUM SERPL-SCNC: 4.4 MMOL/L (ref 3.5–5.1)
PROT SERPL-MCNC: 6.7 G/DL (ref 5.7–8.2)
RBC # BLD AUTO: 4.18 X10(6)UL
SODIUM SERPL-SCNC: 141 MMOL/L (ref 136–145)
TRIGL SERPL-MCNC: 147 MG/DL (ref 30–149)
TSI SER-ACNC: 2.75 MIU/ML (ref 0.55–4.78)
VIT B12 SERPL-MCNC: 750 PG/ML (ref 211–911)
VIT D+METAB SERPL-MCNC: 33.8 NG/ML (ref 30–100)
VLDLC SERPL CALC-MCNC: 21 MG/DL (ref 0–30)
WBC # BLD AUTO: 7.4 X10(3) UL (ref 4–11)

## 2024-02-12 PROCEDURE — 85025 COMPLETE CBC W/AUTO DIFF WBC: CPT

## 2024-02-12 PROCEDURE — 82570 ASSAY OF URINE CREATININE: CPT

## 2024-02-12 PROCEDURE — 82043 UR ALBUMIN QUANTITATIVE: CPT

## 2024-02-12 PROCEDURE — 84443 ASSAY THYROID STIM HORMONE: CPT

## 2024-02-12 PROCEDURE — 80053 COMPREHEN METABOLIC PANEL: CPT

## 2024-02-12 PROCEDURE — 80061 LIPID PANEL: CPT

## 2024-02-12 PROCEDURE — 83036 HEMOGLOBIN GLYCOSYLATED A1C: CPT

## 2024-02-12 PROCEDURE — 82306 VITAMIN D 25 HYDROXY: CPT

## 2024-02-12 PROCEDURE — 82607 VITAMIN B-12: CPT

## 2024-02-12 PROCEDURE — 36415 COLL VENOUS BLD VENIPUNCTURE: CPT

## 2024-02-12 NOTE — PROGRESS NOTES
Subjective:   Angy Partida is a 69 year old female who presents for a Medicare Subsequent Annual Wellness visit (Pt already had Initial Annual Wellness) and scheduled follow up of multiple significant but stable problems.   Reports few weeks ago was sick for 3 weeks - took covid tests and negative - treated at home. Now improved. Glucose fluctuating.   Pain in right foot off and on. Has a bump there. Walking dogs regularly. Lives with daughter. Has her support.   History/Other:   Fall Risk Assessment:   She has been screened for Falls and is High Risk. Fall Prevention information provided to patient in After Visit Summary.          Cognitive Assessment:   She had a completely normal cognitive assessment - see flowsheet entries     Functional Ability/Status:   Angy Partida has some abnormal functions as listed below:  She has Driving difficulties based on screening of functional status. She has Vision problems based on screening of functional status. She has problems with Memory based on screening of functional status.       Depression Screening (PHQ-2/PHQ-9): PHQ-2 SCORE: 0  , done 2/5/2024        Advanced Directives:   She does NOT have a Living Will. [ ]  She does NOT have a Power of  for Health Care. [ ]  Discussed Advance Care Planning with patient (and family/surrogate if present). Standard forms made available to patient in After Visit Summary.      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    Current  Medications:  Outpatient Medications Marked as Taking for the 2/12/24 encounter (Office Visit) with Omaira Shah MD   Medication Sig    Continuous Blood Gluc Sensor (FREESTYLE AROLDO 2 SENSOR) Does not apply Misc 1 each every 14 (fourteen) days.    insulin aspart (NOVOLOG FLEXPEN) 100 Units/mL Subcutaneous Solution Pen-injector INJECT 22 UNITS UNDER THE SKIN 3 TIMES A DAY BEFORE MEALS    Insulin Pen Needle 32G X 6 MM Does not apply Misc USE 4 TIMES A DAY    ALPRAZolam 0.25 MG Oral Tab 1 TABLET 2 TIMES DAILY AS NEEDED    Meloxicam 15 MG Oral Tab Take 1 tablet (15 mg total) by mouth daily.    pantoprazole 40 MG Oral Tab EC Take 1 tablet (40 mg total) by mouth daily.    amLODIPine 5 MG Oral Tab Take 1 tablet (5 mg total) by mouth daily.    fenofibrate micronized 200 MG Oral Cap Take 1 capsule (200 mg total) by mouth every morning before breakfast.    lisinopril 30 MG Oral Tab Take 1 tablet (30 mg total) by mouth daily.    insulin detemir (LEVEMIR FLEXPEN) 100 UNIT/ML Subcutaneous Solution Pen-injector Inject 65 Units into the skin nightly.    Insulin Syringe-Needle U-100 27G X 1/2\" 0.5 ML Does not apply Misc Use with insulin as directed    Accu-Chek Softclix Lancets Does not apply Misc Check blood glucose 3 times daily    Lancets Does not apply Misc Accu check softclix lancets - check glucose up to 3 times per day. Dx E11.9    Glucose Blood (ACCU-CHEK ELAINE PLUS) In Vitro Strip Use 1 strip as directed three times daily    ondansetron (ZOFRAN) 4 mg tablet Take 1 tablet (4 mg total) by mouth every 8 (eight) hours as needed for Nausea.    Insulin Pen Needle (PEN NEEDLES) 32G X 4 MM Does not apply Misc 1 each every 7 days.    atorvastatin 40 MG Oral Tab     Fluticasone Propionate 50 MCG/ACT Nasal Suspension USE 2 SPRAYS IN EACH NOSTRIL EVERY DAY    FLUAD QUADRIVALENT 0.5 ML Intramuscular Prefilled Syringe PHARMACY ADMINISTERED    DICLOFENAC SODIUM 1 % Transdermal Gel APPLY 2 GRAMS TO AFFECTED AREA 4 TIMES A DAY     Lancets Does not apply Misc Test blood sugar 3 times daily.    VITAMIN D3 SUPER STRENGTH 2000 UNITS Oral Tab TAKE ONE TABLET BY MOUTH ONE TIME DAILY     Vitamin B-12 (VITAMIN B12) 1000 MCG Oral Tab Take 1 tablet by mouth once daily.    Ferrous Sulfate 325 (65 Fe) MG Oral Tab Take 1 tablet (325 mg total) by mouth daily with breakfast.     Current Facility-Administered Medications for the 2/12/24 encounter (Office Visit) with Omaira Shah MD   Medication    hyaluronic acid (SYNVISC-ONE) injection       Medical History:  She  has a past medical history of Anemia, Angle-closure glaucoma (2012), Calculus of kidney, Diverticulosis of large intestine (2014), Esophageal reflux, Eye exam, routine (03/14/2016), Hemorrhoids (Internal), High blood pressure, Hyperlipidemia (01/01/2001), IBS (irritable bowel syndrome), Migraines, Obesity, PONV (postoperative nausea and vomiting), Rheumatoid arthritis (HCC), and Type 1 diabetes mellitus (HCC) (2001).  Surgical History:  She  has a past surgical history that includes colonoscopy (2007); glaucoma surgery (Bilateral, 2012); Yag Iridectomy - OU - Both Eyes; removal of ovary/tube(s) (Bilateral, 11/04/2017); Breast biopsy (Left, 2004); vaginal hysterectomy (1984); laparoscopic cholecystectomy (1993); knee scope,med/lat menisectomy (Left, 2004); colonoscopy (N/A, 06/26/2018); egd (06/26/2018); elbow fracture surgery (Right, 1998); ravi localization wire 1 site left (cpt=19281) (2004); and hysterectomy.   Family History:  Her family history includes Breast Cancer in her mother; Cancer in her father, mother, and paternal grandmother; Cataracts in her mother; Diabetes in her father and sister; Glaucoma in her father; Hypertension in her sister; Lipids in her sister, sister, sister, and sister; Prostate Cancer in her father.  Social History:  She  reports that she has never smoked. She has never used smokeless tobacco. She reports that she does not drink alcohol and does not use  drugs.    Tobacco:  She has never smoked tobacco.    CAGE Alcohol Screen:   CAGE screening score of 0 on 2/5/2024, showing low risk of alcohol abuse.      Patient Care Team:  Omaira Shah MD as PCP - General (Family Practice)    Review of Systems     Negative except cold symptoms - but improved.     Objective:   Physical Exam  Constitutional:       Appearance: Normal appearance.   HENT:      Right Ear: Tympanic membrane normal.      Left Ear: Tympanic membrane normal.      Mouth/Throat:      Mouth: Mucous membranes are moist.   Eyes:      Conjunctiva/sclera: Conjunctivae normal.   Cardiovascular:      Rate and Rhythm: Normal rate and regular rhythm.      Heart sounds: Murmur heard.   Pulmonary:      Effort: Pulmonary effort is normal.      Breath sounds: Normal breath sounds.   Abdominal:      General: Bowel sounds are normal.   Neurological:      Mental Status: She is alert and oriented to person, place, and time.   Psychiatric:         Behavior: Behavior normal.     Bilateral barefoot skin diabetic exam is normal, visualized feet and the appearance is normal.  Bilateral monofilament/sensation of both feet is normal.  Pulsation pedal pulse exam of both lower legs/feet is normal as well.   Right foot dorsum mass     /71   Pulse 73   Wt 207 lb (93.9 kg)   BMI 40.43 kg/m²  Estimated body mass index is 40.43 kg/m² as calculated from the following:    Height as of 7/27/23: 5' (1.524 m).    Weight as of this encounter: 207 lb (93.9 kg).    Medicare Hearing Assessment:   Hearing Screening    Time taken: 2/12/2024  8:50 AM  Screening Method: Questionnaire  I have a problem hearing over the telephone: No I have trouble following the conversations when two or more people are talking at the same time: Sometimes   I have trouble understanding things on the TV: Sometimes I have to strain to understand conversations: No   I have to worry about missing the telephone ring or doorbell: No I have trouble hearing  conversations in a noisy background such as a crowded room or restaurant: Yes   I get confused about where sounds come from: No I misunderstand some words in a sentence and need to ask people to repeat themselves: Sometimes   I especially have trouble understanding the speech of women and children: No I have trouble understanding the speaker in a large room such as at a meeting or place of L[employer]: Yes   Many people I talk to seem to mumble (or don't speak clearly): No People get annoyed because I misunderstand what they say: No   I misunderstand what others are saying and make inappropriate responses: No I avoid social activities because I cannot hear well and fear I will reply improperly: No   Family members and friends have told me they think I may have hearing loss: No             Visual Acuity:   Right Eye Visual Acuity: Uncorrected Right Eye Chart Acuity: 20/50   Left Eye Visual Acuity: Uncorrected Left Eye Chart Acuity: 20/50   Both Eyes Visual Acuity: Uncorrected Both Eyes Chart Acuity: 20/40            Assessment & Plan:   Angy Partida is a 69 year old female who presents for a Medicare Assessment.     1. Type 2 diabetes mellitus without complication, with long-term current use of insulin (HCC)  Has been controlled - per Endo. Due for labs.   - CBC With Differential With Platelet; Future  - Comp Metabolic Panel (14); Future  - Lipid Panel; Future  - Microalb/Creat Ratio, Random Urine; Future  - TSH W Reflex To Free T4; Future  - Hemoglobin A1C; Future  - Podiatry Referral - In Network    2. Morbid (severe) obesity due to excess calories (HCC)  Contributes to diabetes. Continue healthy eating and regular exercise.     3. 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 Per ophthalmologist      4. Nonrheumatic aortic valve stenosis  Per cards. Stable. Recent echo     5. Mixed hyperlipidemia  Stable on meds     6.  Essential hypertension, benign  Stable on meds     7. Dry eye syndrome of bilateral lacrimal glands  Stable.     8. Vitamin D deficiency  Stable on supplements   - Vitamin D; Future  - Vitamin B12 [E]; Future    9. Screening mammogram for breast cancer    - [facility] [place] SHERRI 2D+3D SCREENING BILAT (CPT=77067/44505); Future    10. Encounter for annual health examination      11. Foot mass, right  Referral to podiatrist.   - Podiatry Referral - In Network    The patient indicates understanding of these issues and agrees to the plan.  Reinforced healthy diet, lifestyle, and exercise.      Return in 6 months (on 8/12/2024).     Omaira Shah MD, 2/12/2024     Supplementary Documentation:   General Health:          Angy Partida's SCREENING SCHEDULE   Tests on this list are recommended by your physician but may not be covered, or covered at this frequency, by your insurer.   Please check with your insurance carrier before scheduling to verify coverage.   PREVENTATIVE SERVICES FREQUENCY &  COVERAGE DETAILS LAST COMPLETION DATE   Diabetes Screening    Fasting Blood Sugar /  Glucose    One screening every 12 months if never tested or if previously tested but not diagnosed with pre-diabetes   One screening every 6 months if diagnosed with pre-diabetes Lab Results   Component Value Date    GLUCOSE 111 (H) 03/10/2012     (H) 08/09/2023        Cardiovascular Disease Screening    Lipid Panel  Cholesterol  Lipoprotein (HDL)  Triglycerides Covered every 5 years for all Medicare beneficiaries without apparent signs or symptoms of cardiovascular disease Lab Results   Component Value Date    CHOLEST 129 08/09/2023    HDL 55 08/09/2023    LDL 50 08/09/2023    TRIG 142 08/09/2023         Electrocardiogram (EKG)   Covered if needed at Welcome to Medicare, and non-screening if indicated for medical reasons 10/26/2017      Ultrasound Screening for Abdominal Aortic Aneurysm (AAA) Covered once in a lifetime for one of the following risk  factors    Men who are 65-75 years old and have ever smoked    Anyone with a family history -     Colorectal Cancer Screening  Covered for ages 50-85; only need ONE of the following:    Colonoscopy   Covered every 10 years    Covered every 2 years if patient is at high risk or previous colonoscopy was abnormal 06/26/2018    Health Maintenance   Topic Date Due    Colorectal Cancer Screening  06/26/2028       Flexible Sigmoidoscopy   Covered every 4 years -    Fecal Occult Blood Test Covered annually -   Bone Density Screening    Bone density screening    Covered every 2 years after age 65 if diagnosed with risk of osteoporosis or estrogen deficiency.    Covered yearly for long-term glucocorticoid medication use (Steroids) Last Dexa Scan:    XR DEXA BONE DENSITOMETRY (CPT=77080) 11/20/2023      No recommendations at this time   Pap and Pelvic    Pap   Covered every 2 years for women at normal risk; Annually if at high risk -  No recommendations at this time    Chlamydia Annually if high risk -  No recommendations at this time   Screening Mammogram    Mammogram     Recommend annually for all female patients aged 40 and older    One baseline mammogram covered for patients aged 35-39 11/18/2023    Health Maintenance   Topic Date Due    Mammogram  11/18/2024       Immunizations    Influenza Covered once per flu season  Please get every year 09/25/2023  No recommendations at this time    Pneumococcal Each vaccine ([id number] & [id number]) covered once after 65 Prevnar 13: 06/13/2019    [id number]: 07/09/2020     No recommendations at this time    Hepatitis B One screening covered for patients with certain risk factors   -  No recommendations at this time    Tetanus Toxoid Not covered by Medicare Part B unless medically necessary (cut with metal); may be covered with your pharmacy prescription benefits -    Tetanus, Diptheria and Pertusis TD and TDaP Not covered by Medicare Part B -  No recommendations at this time     Zoster Not covered by Medicare Part B; may be covered with your pharmacy  prescription benefits -  No recommendations at this time     Diabetes      Hemoglobin A1C Annually; if last result is elevated, may be asked to retest more frequently.    Medicare covers every 3 months Lab Results   Component Value Date     (H) 12/22/2021    A1C 8.0 (A) 10/18/2023       Hemoglobin A1C Test due on 01/18/2024    Creat/alb ratio Annually Lab Results   Component Value Date    MICROALBCREA 7.2 02/08/2023       LDL Annually Lab Results   Component Value Date    LDL 50 08/09/2023       Dilated Eye Exam Annually Last Diabetic Eye Exam:  Last Dilated Eye Exam: 05/30/23  No data recorded       Annual Monitoring of Persistent Medications (ACE/ARB, digoxin diuretics, anticonvulsants)    Potassium Annually Lab Results   Component Value Date    K 4.2 08/09/2023         Creatinine   Annually Lab Results   Component Value Date    CREATSERUM 0.94 08/09/2023         BUN Annually Lab Results   Component Value Date    BUN 19 (H) 08/09/2023       Drug Serum Conc Annually No results found for: \"DIGOXIN\", \"DIG\", \"VALP\"

## 2024-02-12 NOTE — PATIENT INSTRUCTIONS
Angy Partida's SCREENING SCHEDULE   Tests on this list are recommended by your physician but may not be covered, or covered at this frequency, by your insurer.   Please check with your insurance carrier before scheduling to verify coverage.   PREVENTATIVE SERVICES FREQUENCY &  COVERAGE DETAILS LAST COMPLETION DATE   Diabetes Screening    Fasting Blood Sugar /  Glucose    One screening every 12 months if never tested or if previously tested but not diagnosed with pre-diabetes   One screening every 6 months if diagnosed with pre-diabetes Lab Results   Component Value Date    GLUCOSE 111 (H) 03/10/2012     (H) 08/09/2023        Cardiovascular Disease Screening    Lipid Panel  Cholesterol  Lipoprotein (HDL)  Triglycerides Covered every 5 years for all Medicare beneficiaries without apparent signs or symptoms of cardiovascular disease Lab Results   Component Value Date    CHOLEST 129 08/09/2023    HDL 55 08/09/2023    LDL 50 08/09/2023    TRIG 142 08/09/2023         Electrocardiogram (EKG)   Covered if needed at Welcome to Medicare, and non-screening if indicated for medical reasons 10/26/2017      Ultrasound Screening for Abdominal Aortic Aneurysm (AAA) Covered once in a lifetime for one of the following risk factors   • Men who are 65-75 years old and have ever smoked   • Anyone with a family history -     Colorectal Cancer Screening  Covered for ages 50-85; only need ONE of the following:    Colonoscopy   Covered every 10 years    Covered every 2 years if patient is at high risk or previous colonoscopy was abnormal 06/26/2018    Health Maintenance   Topic Date Due   • Colorectal Cancer Screening  06/26/2028       Flexible Sigmoidoscopy   Covered every 4 years -    Fecal Occult Blood Test Covered annually -   Bone Density Screening    Bone density screening    Covered every 2 years after age 65 if diagnosed with risk of osteoporosis or estrogen deficiency.    Covered yearly for long-term glucocorticoid  medication use (Steroids) Last Dexa Scan:    XR DEXA BONE DENSITOMETRY (CPT=77080) 11/20/2023      No recommendations at this time   Pap and Pelvic    Pap   Covered every 2 years for women at normal risk; Annually if at high risk -  No recommendations at this time    Chlamydia Annually if high risk -  No recommendations at this time   Screening Mammogram    Mammogram     Recommend annually for all female patients aged 40 and older    One baseline mammogram covered for patients aged 35-39 11/18/2023    Health Maintenance   Topic Date Due   • Mammogram  11/18/2024       Immunizations    Influenza Covered once per flu season  Please get every year 09/25/2023  No recommendations at this time    Pneumococcal Each vaccine ([id number] & [id number]) covered once after 65 Prevnar 13: 06/13/2019    [id number]: 07/09/2020     No recommendations at this time    Hepatitis B One screening covered for patients with certain risk factors   -  No recommendations at this time    Tetanus Toxoid Not covered by Medicare Part B unless medically necessary (cut with metal); may be covered with your pharmacy prescription benefits -    Tetanus, Diptheria and Pertusis TD and TDaP Not covered by Medicare Part B -  No recommendations at this time    Zoster Not covered by Medicare Part B; may be covered with your pharmacy  prescription benefits -  No recommendations at this time     Diabetes      Hemoglobin A1C Annually; if last result is elevated, may be asked to retest more frequently.    Medicare covers every 3 months Lab Results   Component Value Date     (H) 12/22/2021    A1C 8.0 (A) 10/18/2023       Hemoglobin A1C Test due on 01/18/2024    Creat/alb ratio Annually Lab Results   Component Value Date    MICROALBCREA 7.2 02/08/2023       LDL Annually Lab Results   Component Value Date    LDL 50 08/09/2023       Dilated Eye Exam Annually Last Diabetic Eye Exam:  Last Dilated Eye Exam: 05/30/23  No data recorded       Annual Monitoring  of Persistent Medications (ACE/ARB, digoxin diuretics, anticonvulsants)    Potassium Annually Lab Results   Component Value Date    K 4.2 08/09/2023         Creatinine   Annually Lab Results   Component Value Date    CREATSERUM 0.94 08/09/2023         BUN Annually Lab Results   Component Value Date    BUN 19 (H) 08/09/2023       Drug Serum Conc Annually No results found for: \"DIGOXIN\", \"DIG\", \"VALP\"

## 2024-02-15 NOTE — PROGRESS NOTES
Diabetes is about the same as last check - 8.0 last time. Review with Dr. Schofield next week. Rest of the labs are stable. - Dr. Shah

## 2024-02-21 ENCOUNTER — OFFICE VISIT (OUTPATIENT)
Dept: ENDOCRINOLOGY CLINIC | Facility: CLINIC | Age: 69
End: 2024-02-21
Payer: MEDICARE

## 2024-02-21 VITALS
BODY MASS INDEX: 40 KG/M2 | DIASTOLIC BLOOD PRESSURE: 82 MMHG | HEART RATE: 71 BPM | SYSTOLIC BLOOD PRESSURE: 135 MMHG | WEIGHT: 207 LBS

## 2024-02-21 DIAGNOSIS — E11.65 UNCONTROLLED TYPE 2 DIABETES MELLITUS WITH HYPERGLYCEMIA (HCC): Primary | ICD-10-CM

## 2024-02-21 LAB
GLUCOSE BLOOD: 191
TEST STRIP LOT #: NORMAL NUMERIC

## 2024-02-21 PROCEDURE — 95251 CONT GLUC MNTR ANALYSIS I&R: CPT | Performed by: INTERNAL MEDICINE

## 2024-02-21 PROCEDURE — 99214 OFFICE O/P EST MOD 30 MIN: CPT | Performed by: INTERNAL MEDICINE

## 2024-02-21 PROCEDURE — 82947 ASSAY GLUCOSE BLOOD QUANT: CPT | Performed by: INTERNAL MEDICINE

## 2024-02-21 RX ORDER — INSULIN DEGLUDEC 200 U/ML
68 INJECTION, SOLUTION SUBCUTANEOUS NIGHTLY
Qty: 60 ML | Refills: 1 | Status: SHIPPED | OUTPATIENT
Start: 2024-02-21

## 2024-02-21 NOTE — PROGRESS NOTES
Name: Angy Partida  Date: 2/21/2024    Referring Physician: No ref. provider found    HISTORY OF PRESENT ILLNESS   Angy Partida is a 69 year old female who presents for diabetes mellitus, diagnosed in 2001.     Prior HbA, C or glycohemoglobin were 8.6% 1/2021; 7.4% 5/2021; 7.5% 9/2021; 7.9% 1/2022; 7.1% 5/2022; 7.2% 9/2022; 7.2% 2/2023; 7.4% 5/2023; 8.0% 10/2023; 8.1% 2/2024     Dietary compliance: Good -->improved diet over the past few months   Exercise: Yes -->walking puppy   Polyuria/polydipsia: No  Blurred vision: No    Episodes of hypoglycemia: Yes - occ nocturnal hypoglycemia   Blood Glucose:  Checking 4 times per day  Reviewed Eladio Download      Medications for DM   Novolog 24-28 units subcutaneous TID with meals  Levemir 62 units subcutaneous at bedtime     Metformin d/c'd due to GI upset     REVIEW OF SYSTEMS  Eyes: Diabetic retinopathy present: No            Most recent visit to eye doctor in last 12 months: No - visit scheduled for next week     CV: Cardiovascular disease present: No         Hypertension present: Yes         Hyperlipidemia present: Yes         Peripheral Vascular Disease present: No    : Nephropathy present: No    Neuro: Neuropathy present: No    Skin: Infection or ulceration: No    Osteoporosis: No    Thyroid disease: No      Medications:     Current Outpatient Medications:     Continuous Blood Gluc Sensor (FREESTYLE ELADIO 2 SENSOR) Does not apply Misc, 1 each every 14 (fourteen) days., Disp: 6 each, Rfl: 0    insulin aspart (NOVOLOG FLEXPEN) 100 Units/mL Subcutaneous Solution Pen-injector, INJECT 22 UNITS UNDER THE SKIN 3 TIMES A DAY BEFORE MEALS, Disp: 60 mL, Rfl: 0    Insulin Pen Needle 32G X 6 MM Does not apply Misc, USE 4 TIMES A DAY, Disp: 400 each, Rfl: 1    ALPRAZolam 0.25 MG Oral Tab, 1 TABLET 2 TIMES DAILY AS NEEDED, Disp: 30 tablet, Rfl: 5    Meloxicam 15 MG Oral Tab, Take 1 tablet (15 mg total) by mouth daily., Disp: 90 tablet, Rfl: 1    pantoprazole 40 MG Oral  Tab EC, Take 1 tablet (40 mg total) by mouth daily., Disp: 90 tablet, Rfl: 3    amLODIPine 5 MG Oral Tab, Take 1 tablet (5 mg total) by mouth daily., Disp: 90 tablet, Rfl: 3    fenofibrate micronized 200 MG Oral Cap, Take 1 capsule (200 mg total) by mouth every morning before breakfast., Disp: 90 capsule, Rfl: 3    lisinopril 30 MG Oral Tab, Take 1 tablet (30 mg total) by mouth daily., Disp: 90 tablet, Rfl: 3    insulin detemir (LEVEMIR FLEXPEN) 100 UNIT/ML Subcutaneous Solution Pen-injector, Inject 65 Units into the skin nightly., Disp: 30 each, Rfl: 3    Insulin Syringe-Needle U-100 27G X 1/2\" 0.5 ML Does not apply Misc, Use with insulin as directed, Disp: 100 each, Rfl: 0    Accu-Chek Softclix Lancets Does not apply Misc, Check blood glucose 3 times daily, Disp: 300 each, Rfl: 3    Lancets Does not apply Misc, Accu check softclix lancets - check glucose up to 3 times per day. Dx E11.9, Disp: 200 each, Rfl: 4    Glucose Blood (ACCU-CHEK ELAINE PLUS) In Vitro Strip, Use 1 strip as directed three times daily, Disp: 300 strip, Rfl: 3    ondansetron (ZOFRAN) 4 mg tablet, Take 1 tablet (4 mg total) by mouth every 8 (eight) hours as needed for Nausea., Disp: 20 tablet, Rfl: 1    Insulin Pen Needle (PEN NEEDLES) 32G X 4 MM Does not apply Misc, 1 each every 7 days., Disp: 30 each, Rfl: 0    atorvastatin 40 MG Oral Tab, , Disp: , Rfl:     Fluticasone Propionate 50 MCG/ACT Nasal Suspension, USE 2 SPRAYS IN EACH NOSTRIL EVERY DAY, Disp: 3 Bottle, Rfl: 1    FLUAD QUADRIVALENT 0.5 ML Intramuscular Prefilled Syringe, PHARMACY ADMINISTERED, Disp: , Rfl:     DICLOFENAC SODIUM 1 % Transdermal Gel, APPLY 2 GRAMS TO AFFECTED AREA 4 TIMES A DAY, Disp: 100 g, Rfl: 0    Lancets Does not apply Misc, Test blood sugar 3 times daily., Disp: 300 each, Rfl: 3    VITAMIN D3 SUPER STRENGTH 2000 UNITS Oral Tab, TAKE ONE TABLET BY MOUTH ONE TIME DAILY , Disp: 30 tablet, Rfl: 11    Vitamin B-12 (VITAMIN B12) 1000 MCG Oral Tab, Take 1 tablet by  mouth once daily., Disp: 30 tablet, Rfl: 2    Ferrous Sulfate 325 (65 Fe) MG Oral Tab, Take 1 tablet (325 mg total) by mouth daily with breakfast., Disp: , Rfl:      Allergies:   Allergies   Allergen Reactions    Codeine NAUSEA AND VOMITING     Abdominal pain    Hydrocodone NAUSEA ONLY    Morphine NAUSEA AND VOMITING     Abdominal pain         Social History:   Social History     Socioeconomic History    Marital status:     Number of children: 1   Occupational History    Occupation: homemaker   Tobacco Use    Smoking status: Never    Smokeless tobacco: Never   Vaping Use    Vaping Use: Never used   Substance and Sexual Activity    Alcohol use: No    Drug use: No   Other Topics Concern    Caffeine Concern No    Right Handed Yes    Currently spends a great deal of time in the sun Yes    History of tanning No    Hx of Spending Great Deal of Time in Sun Yes    Bad sunburns in the past Yes    Tanning Salons in the Past No    Hx of Radiation Treatments No       Medical History:   Past Medical History:   Diagnosis Date    Anemia     Angle-closure glaucoma 2012    Calculus of kidney     Diverticulosis of large intestine 2014    Esophageal reflux     Eye exam, routine 03/14/2016    [facility]     Hemorrhoids Internal    High blood pressure     Hyperlipidemia 01/01/2001    IBS (irritable bowel syndrome)     Migraines     Obesity     PONV (postoperative nausea and vomiting)     Rheumatoid arthritis (HCC)     Type 1 diabetes mellitus (HCC) 2001       Surgical history:   Past Surgical History:   Procedure Laterality Date    BREAST BIOPSY Left 2004    fibroadenoma    COLONOSCOPY  2007    COLONOSCOPY N/A 06/26/2018    Procedure: COLONOSCOPY;  Surgeon: Richard Healy MD;  Location: [facility] ENDOSCOPY    EGD  06/26/2018    ELBOW FRACTURE SURGERY Right 1998    GLAUCOMA SURGERY Bilateral 2012    HYSTERECTOMY      KNEE SCOPE,MED/LAT MENISECTOMY Left 2004    LAPAROSCOPIC CHOLECYSTECTOMY  1993    [facility] LOCALIZATION WIRE 1  SITE LEFT (CPT=19281)  2004    REMOVAL OF OVARY/TUBE(S) Bilateral 11/04/2017    Laparoscopic, Ghia/Potkul    VAGINAL HYSTERECTOMY  1984    YAG IRIDECTOMY - OU - BOTH EYES      OS 5/10/12, OD 4/19/12         PHYSICAL EXAM  /82   Pulse 71   Wt 207 lb (93.9 kg)   BMI 40.43 kg/m²     General Appearance:  alert, well developed, in no acute distress  Eyes:  normal conjunctivae, sclera., normal sclera and normal pupils  Ears/Nose/Mouth/Throat/Neck:  no palpable thyroid nodules   Back: no kyphosis or back tenderness  Musculoskeletal:  normal muscle strength and tone  Skin:  normal moisture and skin texture  Hair & Nails:  normal scalp hair     Hematologic:  no excessive bruising  Neuro:  sensory grossly intact and motor grossly intact  Psychiatric:  oriented to time, self, and place  Nutritional:  no abnormal weight gain or loss    ASSESSMENT/PLAN:      1. Diabetes Mellitus Type 2, controlled  -controlled; HgA1c 8.1% -->increased since last visit   -Discussed importance of glycemic control to prevent complications of diabetes  -Discussed complications of diabetes include retinopathy, neuropathy, nephropathy and cardiovascular disease  -Discussed importance of SBGM  -Discussed importance of low CHO diet, recommend 45gm per meal or 135gm per day  -Discontinue levemir  -Change to Tresiba and increase dose to 68 units subcutaneous daily   -Increase Novolog 28 units subcutaneous QAM; 26 units subcutaneous dinner    -Discussed taking Novolog 15 minutes before meals to prevent postprandial hyperglycemia   -Continue CF 1:40>140   -Continue Eladio CGM   -Normotensive  -Normal lipids  -Foot exam with Dr. Shah 2/2024     2. Hypercalcemia  - New diagnosis, now improved   - Not PTH mediated  - Continue Vitamin D 2000 units daily  - labs improved, stable      Continuous Glucose Monitoring Interpretation    Angy Partida has undergone continuous glucose monitoring with the personal Freestyle Eladio.   Her blood glucose tracings  were evaluated for two weeks prior to office visit.  Overall her tracings demonstrated well controlled blood glucose levels with some increased post prandial hyperglycemia.  She did not experience any severe hypoglycemia during the week of evaluation.  As a result of her testing her medication was adjusted as noted above.       RTC 4 months     2/21/2024  Lulu Schofield MD

## 2024-02-21 NOTE — PATIENT INSTRUCTIONS
INCREASE Levemir to 68 units subcutaneous daily at bedtime -->then change Tresiba    INSULIN SLIDING SCALE  Base Values  Breakfast: 28     Dinner: 26  Ranges:  80-99: -2  100-119: 0  120-139: 0  140-159: 1  160-179: 1  180-199: 2  200-219: 2  220-239: 3  240-259: 3  260-279: 4  280-299: 4  300-319: 5  320-339: 5  340-359: 6

## 2024-02-26 RX ORDER — LISINOPRIL 30 MG/1
30 TABLET ORAL DAILY
Qty: 90 TABLET | Refills: 3 | Status: SHIPPED | OUTPATIENT
Start: 2024-02-26

## 2024-02-26 NOTE — TELEPHONE ENCOUNTER
Refill Passed Per Protocol    Requested Prescriptions   Pending Prescriptions Disp Refills    LISINOPRIL 30 MG Oral Tab [Pharmacy Med Name: LISINOPRIL 30 MG TABLET] 90 tablet 3     Sig: TAKE 1 TABLET BY MOUTH EVERY DAY       Hypertension Medications Protocol Passed - 2/25/2024  7:25 AM        Passed - CMP or BMP in past 12 months        Passed - Last BP reading less than 140/90     BP Readings from Last 1 Encounters:   02/21/24 135/82               Passed - In person appointment or virtual visit in the past 12 mos or appointment in next 3 mos     Recent Outpatient Visits              5 days ago Uncontrolled type 2 diabetes mellitus with hyperglycemia ([facility])    [facility] Lulu Schofield MD    Office Visit    2 weeks ago Type 2 diabetes mellitus without complication, with long-term current use of insulin ([facility])    [facility] [place] [facility]    Office Visit    4 months ago Uncontrolled type 2 diabetes mellitus with hyperglycemia ([facility])    [facility] Lulu Schofield MD    Office Visit    6 months ago Type 2 diabetes mellitus without complication, with long-term current use of insulin ([facility]    Office Visit    7 months ago Herpes zoster without complication    [facility] [place] Jt Manuel DO    Office Visit          Future Appointments         Provider Department Appt Notes    In 4 months Lulu Schofield MD [facility] F/u    In 5 months Omaira Shah MD [facility]                Passed - EGFRCR or GFRNAA > 50     GFR Evaluation  EGFRCR: 65 , resulted on 2/12/2024               Future Appointments         Provider Department Appt Notes    In 4 months Lulu Schofield MD [facility]  [facility] F/u    In 5 months Omaira Shah MD [facility]           Recent Outpatient Visits              5 days ago Uncontrolled type 2 diabetes mellitus with hyperglycemia ([facility])    [facility] Lulu Schofield MD    Office Visit    2 weeks ago Type 2 diabetes mellitus without complication, with long-term current use of insulin ([facility])    [facility]maira Rosado [facility]    Office Visit    4 months ago Uncontrolled type 2 diabetes mellitus with hyperglycemia ([facility]    Office Visit    6 months ago Type 2 diabetes mellitus without complication, with long-term current use of insulin ([facility])    [facility]    Office Visit    7 months ago Herpes zoster without complication    [facility] tJ Manuel DO    Office Visit

## 2024-03-28 RX ORDER — MELOXICAM 15 MG/1
15 TABLET ORAL DAILY
Qty: 90 TABLET | Refills: 1 | Status: SHIPPED | OUTPATIENT
Start: 2024-03-28

## 2024-03-28 NOTE — TELEPHONE ENCOUNTER
REFILL PASSED PER [facility] with [facility] PROTOCOLS    Requested Prescriptions   Pending Prescriptions Disp Refills    MELOXICAM 15 MG Oral Tab [Pharmacy Med Name: MELOXICAM 15 MG TABLET] 90 tablet 1     Sig: Take 1 tablet (15 mg total) by mouth daily.       Non-Narcotic Pain Medication Protocol Passed - 3/27/2024  1:01 AM        Passed - In person appointment or virtual visit in the past 6 mos or appointment in next 3 mos     Recent Outpatient Visits              1 month ago Uncontrolled type 2 diabetes mellitus with hyperglycemia ([facility])    [facility] Lulu Schofield MD    Office Visit    1 month ago Type 2 diabetes mellitus without complication, with long-term current use of insulin ([facility])    [facility] Omaira Shah MD    Office Visit    5 months ago Uncontrolled type 2 diabetes mellitus with hyperglycemia ([facility])    [facility] Lulu Schofield MD    Office Visit    7 months ago Type 2 diabetes mellitus without complication, with long-term current use of insulin ([facility])    [facility] Omaira Shah MD    Office Visit    8 months ago Herpes zoster without complication    [facility] Jt Manuel DO    Office Visit          Future Appointments         Provider Department Appt Notes    In 3 months Lulu Schofield MD [facility] F/u    In 4 months Omaira Shah MD [facility]                     Future Appointments         Provider Department Appt Notes    In 3 months Lulu Schofield MD [facility] F/u    In 4 months Omaira Shah MD [facility]           Recent Outpatient Visits              1 month ago Uncontrolled type 2 diabetes mellitus with hyperglycemia  ([facility]    Office Visit    1 month ago Type 2 diabetes mellitus without complication, with long-term current use of insulin ([facility]    Office Visit    5 months ago Uncontrolled type 2 diabetes mellitus with hyperglycemia ([facility]    [facility] Visit    7 months ago Type 2 diabetes mellitus without complication, with long-term current use of insulin ([facility]    Office Visit    8 months ago Herpes zoster without complication    [facility]    [facility] Visit

## 2024-04-19 RX ORDER — INSULIN ASPART 100 [IU]/ML
INJECTION, SOLUTION INTRAVENOUS; SUBCUTANEOUS
Qty: 60 ML | Refills: 0 | Status: SHIPPED | OUTPATIENT
Start: 2024-04-19

## 2024-04-19 NOTE — TELEPHONE ENCOUNTER
Endocrine refill protocol for basal insulins     Protocol Criteria:  - Appointment with Endocrinology completed in the last 6 months or scheduled in the next 3 months    - A1c result completed in the last 6 months and is <8.5%     Verify appointment has been completed or scheduled in the appropriate timeline. If so can send a 90 day supply with 1 refill per provider protocol.    Verify A1c has been completed within the last 6 months and is below 8.5%     Last completed office visit:02/21/24  Next scheduled Follow up: 07/03/24  Last A1c result:  8.1%

## 2024-04-22 ENCOUNTER — PATIENT MESSAGE (OUTPATIENT)
Dept: ENDOCRINOLOGY CLINIC | Facility: CLINIC | Age: 69
End: 2024-04-22

## 2024-04-23 NOTE — TELEPHONE ENCOUNTER
From: Angy Partida  To: Lulu Schofield  Sent: 4/22/2024 1:49 PM CDT  Subject: Rapid acting insulin that is covered    Dr. Schofield, The following Rapid Acting insulins are covered by my prescription formulary Fiasp Flex touch, pen, and Novalin and Novalog mix. All insulin pens . Please choose the one you think would be better for me and please send a prescription for a 90 day supply to my pharmacy [facility] in [place] in [facility]. If you would like to check for yourself if they’re covered, I was assured they would be the address is [email or web address] I can no longer get Novalog as it is no longer covered, so please see about getting this prescription as soon as possible to pharmacy thank you, Angy Partida

## 2024-04-23 NOTE — TELEPHONE ENCOUNTER
Dr. Schofield, please see patients MyChart message regarding preferred Rapid acting insulin. Thank you.

## 2024-04-24 ENCOUNTER — PATIENT MESSAGE (OUTPATIENT)
Dept: ENDOCRINOLOGY CLINIC | Facility: CLINIC | Age: 69
End: 2024-04-24

## 2024-04-24 RX ORDER — INSULIN ASPART INJECTION 100 [IU]/ML
26 INJECTION, SOLUTION SUBCUTANEOUS 3 TIMES DAILY
Qty: 60 ML | Refills: 1 | Status: SHIPPED | OUTPATIENT
Start: 2024-04-24

## 2024-04-25 NOTE — TELEPHONE ENCOUNTER
From: Angy Partida  To: Lulu Schofield  Sent: 4/24/2024 10:32 AM CDT  Subject: Has Dr Schofield sent prescription       Hi, I am following up as to my request for Dr. Cardenas to look at the three available rapid insulin choices that my formulary has given me for rapid insulin. Has she seen my message and has she decided on what insulin to put me on I no longer have Novalog and I will soon be out of rapid acting insulin so I need a prescription sent to [facility] [employer] [facility]. please let me know as soon as possible. What decision and what insulin I will be receiving. Thank you, Angy Partida.

## 2024-05-08 ENCOUNTER — OFFICE VISIT (OUTPATIENT)
Dept: FAMILY MEDICINE CLINIC | Facility: CLINIC | Age: 69
End: 2024-05-08
Payer: MEDICARE

## 2024-05-08 VITALS
BODY MASS INDEX: 38.93 KG/M2 | DIASTOLIC BLOOD PRESSURE: 76 MMHG | HEART RATE: 69 BPM | HEIGHT: 61 IN | SYSTOLIC BLOOD PRESSURE: 130 MMHG | WEIGHT: 206.19 LBS

## 2024-05-08 DIAGNOSIS — E11.9 TYPE 2 DIABETES MELLITUS WITHOUT COMPLICATION, WITH LONG-TERM CURRENT USE OF INSULIN (HCC): ICD-10-CM

## 2024-05-08 DIAGNOSIS — R10.13 EPIGASTRIC PAIN: Primary | ICD-10-CM

## 2024-05-08 DIAGNOSIS — F41.9 ANXIETY: ICD-10-CM

## 2024-05-08 DIAGNOSIS — E66.01 MORBID (SEVERE) OBESITY DUE TO EXCESS CALORIES (HCC): ICD-10-CM

## 2024-05-08 DIAGNOSIS — K58.0 IRRITABLE BOWEL SYNDROME WITH DIARRHEA: ICD-10-CM

## 2024-05-08 DIAGNOSIS — Z79.4 TYPE 2 DIABETES MELLITUS WITHOUT COMPLICATION, WITH LONG-TERM CURRENT USE OF INSULIN (HCC): ICD-10-CM

## 2024-05-08 PROCEDURE — 99214 OFFICE O/P EST MOD 30 MIN: CPT | Performed by: FAMILY MEDICINE

## 2024-05-08 RX ORDER — SERTRALINE HYDROCHLORIDE 25 MG/1
25 TABLET, FILM COATED ORAL EVERY EVENING
Qty: 90 TABLET | Refills: 1 | Status: SHIPPED | OUTPATIENT
Start: 2024-05-08

## 2024-05-08 RX ORDER — DICYCLOMINE HYDROCHLORIDE 10 MG/1
10 CAPSULE ORAL
Qty: 120 CAPSULE | Refills: 2 | Status: SHIPPED | OUTPATIENT
Start: 2024-05-08

## 2024-05-08 NOTE — PROGRESS NOTES
Angy Partida is a 69 year old female.   Chief Complaint   Patient presents with    Change of Bowel Habits     Alternate between constipation and diarrhea x 1 months    Low Back Pain     1 month     HPI:   Here with daughter.   Reports ongoing issues with constipation vs diarrhea  - off and on - after eating. Usually having eggs - gave up fatty foods - no more estrada. Gave up caffeine and sugary drink. - has calmed down a bit. Occurs with every meal.   Admits to a lot of anxiety.   1 month ago - 2 day episode-  Had some epigastric pain and left lower back pain.   Current Outpatient Medications on File Prior to Visit   Medication Sig Dispense Refill    Insulin Aspart, w/Niacinamide, (FIASP FLEXTOUCH) 100 UNIT/ML Subcutaneous Solution Pen-injector Inject 26 Units into the skin in the morning, at noon, and at bedtime. 60 mL 1    Meloxicam 15 MG Oral Tab Take 1 tablet (15 mg total) by mouth daily. 90 tablet 1    lisinopril 30 MG Oral Tab Take 1 tablet (30 mg total) by mouth daily. 90 tablet 3    insulin degludec (TRESIBA FLEXTOUCH) 200 UNIT/ML Subcutaneous Solution Pen-injector Inject 68 Units into the skin nightly. 60 mL 1    Continuous Blood Gluc Sensor (FREESTYLE AROLDO 2 SENSOR) Does not apply Misc 1 each every 14 (fourteen) days. 6 each 0    Insulin Pen Needle 32G X 6 MM Does not apply Misc USE 4 TIMES A  each 1    ALPRAZolam 0.25 MG Oral Tab 1 TABLET 2 TIMES DAILY AS NEEDED 30 tablet 5    pantoprazole 40 MG Oral Tab EC Take 1 tablet (40 mg total) by mouth daily. 90 tablet 3    amLODIPine 5 MG Oral Tab Take 1 tablet (5 mg total) by mouth daily. 90 tablet 3    fenofibrate micronized 200 MG Oral Cap Take 1 capsule (200 mg total) by mouth every morning before breakfast. 90 capsule 3    Insulin Syringe-Needle U-100 27G X 1/2\" 0.5 ML Does not apply Misc Use with insulin as directed 100 each 0    Accu-Chek Softclix Lancets Does not apply Misc Check blood glucose 3 times daily 300 each 3    Lancets Does not apply  Misc Accu check softclix lancets - check glucose up to 3 times per day. Dx E11.9 200 each 4    Glucose Blood (ACCU-CHEK ELAINE PLUS) In Vitro Strip Use 1 strip as directed three times daily 300 strip 3    ondansetron (ZOFRAN) 4 mg tablet Take 1 tablet (4 mg total) by mouth every 8 (eight) hours as needed for Nausea. 20 tablet 1    Insulin Pen Needle (PEN NEEDLES) 32G X 4 MM Does not apply Misc 1 each every 7 days. 30 each 0    atorvastatin 40 MG Oral Tab       Fluticasone Propionate 50 MCG/ACT Nasal Suspension USE 2 SPRAYS IN EACH NOSTRIL EVERY DAY 3 Bottle 1    FLUAD QUADRIVALENT 0.5 ML Intramuscular Prefilled Syringe PHARMACY ADMINISTERED      DICLOFENAC SODIUM 1 % Transdermal Gel APPLY 2 GRAMS TO AFFECTED AREA 4 TIMES A  g 0    Lancets Does not apply Misc Test blood sugar 3 times daily. 300 each 3    VITAMIN D3 SUPER STRENGTH 2000 UNITS Oral Tab TAKE ONE TABLET BY MOUTH ONE TIME DAILY  30 tablet 11    Vitamin B-12 (VITAMIN B12) 1000 MCG Oral Tab Take 1 tablet by mouth once daily. 30 tablet 2    Ferrous Sulfate 325 (65 Fe) MG Oral Tab Take 1 tablet (325 mg total) by mouth daily with breakfast.       Current Facility-Administered Medications on File Prior to Visit   Medication Dose Route Frequency Provider Last Rate Last Admin    hyaluronic acid (SYNVISC-ONE) injection  6 mL Intra-articular Once Hector Cordero MD          Past Medical History:    Anemia    Angle-closure glaucoma    Calculus of kidney    Diverticulosis of large intestine    Esophageal reflux    Eye exam, routine    [facility]     Hemorrhoids    High blood pressure    Hyperlipidemia    IBS (irritable bowel syndrome)    Migraines    Obesity    PONV (postoperative nausea and vomiting)    Rheumatoid arthritis (HCC)    Type 1 diabetes mellitus (HCC)      Social History:  Social History     Socioeconomic History    Marital status:     Number of children: 1   Occupational History    Occupation: homemaker   Tobacco Use    Smoking  status: Never    Smokeless tobacco: Never   Vaping Use    Vaping status: Never Used   Substance and Sexual Activity    Alcohol use: No    Drug use: No   Other Topics Concern    Caffeine Concern No    Right Handed Yes    Currently spends a great deal of time in the sun Yes    History of tanning No    Hx of Spending Great Deal of Time in Sun Yes    Bad sunburns in the past Yes    Tanning Salons in the Past No    Hx of Radiation Treatments No        REVIEW OF SYSTEMS:   GENERAL HEALTH: feels well otherwise  SKIN: denies any unusual skin lesions or rashes  HEENT: denies eye complaints,denies sore throat, denies ear pain  RESPIRATORY: denies shortness of breath, denies cough  CARDIOVASCULAR: denies chest pain  GI: pos abdominal pain and pos heartburn  NEURO: pos headaches  Musculoskeletal: pos back pain     EXAM:   /76   Pulse 69   Ht 5' 1\" (1.549 m)   Wt 206 lb 3.2 oz (93.5 kg)   BMI 38.96 kg/m²   GENERAL: well developed, well nourished,in no apparent distress  LUNGS: clear to auscultation  CARDIO: RRR without murmur  GI: good BS's,no masses, HSM or tenderness  EXTREMITIES: no cyanosis, clubbing or edema      ASSESSMENT AND PLAN:   1. Epigastric pain    - z [employer] US ABDOMINAL AORTIC ANEURYSM SCREENING (CPT=76706); Future  - z [employer] US ABDOMINAL AORTIC ANEURYSM SCREENING (CPT=76706)    2. Anxiety  Adding sertraline     3. Morbid (severe) obesity due to excess calories (HCC)  Contributing to diabetes.     4. Type 2 diabetes mellitus without complication, with long-term current use of insulin (HCC)  Been stable.     5. Irritable bowel syndrome with diarrhea  Trial of bentyl before meals and handout given on [employer].       The patient indicates understanding of these issues and agrees to the plan.      Omaira Shah MD  5/8/2024  9:05 AM

## 2024-05-13 ENCOUNTER — PATIENT MESSAGE (OUTPATIENT)
Dept: FAMILY MEDICINE CLINIC | Facility: CLINIC | Age: 69
End: 2024-05-13

## 2024-05-13 ENCOUNTER — TELEPHONE (OUTPATIENT)
Dept: FAMILY MEDICINE CLINIC | Facility: CLINIC | Age: 69
End: 2024-05-13

## 2024-05-13 NOTE — TELEPHONE ENCOUNTER
Spoke to patient (verified Name and ) and relayed Dr. Shah's message below. Patient verbalized understanding and had no further questions at this time.

## 2024-05-13 NOTE — TELEPHONE ENCOUNTER
Patient called, verified Name and . States she started taking sertraline last Thursday and has noticed side effects such as nausea dizziness, dry mouth, loss of appetite, increased, sweating, upset stomach, trouble sleeping, and muscle cramps.    Also read that if she is taking this medication, she cannot take meloxicam due to risk of stomach bleeding. Reports she is on meloxicam, can take at most three times a week, but only takes it when really needed.     She wants to know if she can just stop the medication. She is not interested in getting a different medication in place of sertraline.     BLood pressures have also been low (bottom number) 119/59, 127/61,122/57. She wants to know if it has something to do with the medication as well.    Dr. Shah please advise.

## 2024-05-13 NOTE — TELEPHONE ENCOUNTER
She can just stop the sertraline- not taking it long enough to need to wean off. Only issue with meloxicam if taking on daily basis. Does not affect blood pressure other than give calming someone, it can lowered.

## 2024-05-21 NOTE — LETTER
21      Patient: Carmencita Concepcion  : 1955 Visit date: 2021    Dear Maria De Jesus Stern,      I examined your patient in consultation today.     She suffered a traction injury of the left small finger and has a small nondisplaced volar
obese

## 2024-05-24 ENCOUNTER — TELEPHONE (OUTPATIENT)
Dept: ENDOCRINOLOGY CLINIC | Facility: CLINIC | Age: 69
End: 2024-05-24

## 2024-05-24 ENCOUNTER — PATIENT MESSAGE (OUTPATIENT)
Dept: ENDOCRINOLOGY CLINIC | Facility: CLINIC | Age: 69
End: 2024-05-24

## 2024-05-24 RX ORDER — AMLODIPINE BESYLATE 5 MG/1
5 TABLET ORAL DAILY
Qty: 90 TABLET | Refills: 3 | Status: SHIPPED | OUTPATIENT
Start: 2024-05-24

## 2024-05-24 RX ORDER — PANTOPRAZOLE SODIUM 40 MG/1
40 TABLET, DELAYED RELEASE ORAL DAILY
Qty: 90 TABLET | Refills: 3 | Status: SHIPPED | OUTPATIENT
Start: 2024-05-24

## 2024-05-24 NOTE — TELEPHONE ENCOUNTER
From: Angy Partida  To: Lulu Schofield  Sent: 5/24/2024 10:34 AM CDT  Subject: [facility]    Dr. Cardenas I received a call today telling me that they’re holding my sensors until they receive some kind of information from you that I have been going to you so that they can process them and mail them out but until they receive the information they will not send the sensors!

## 2024-05-24 NOTE — TELEPHONE ENCOUNTER
Per 5/24/24 TE, form was received for physician to sign.  It was placed in the folder.  Advised pt to use traditional meter if she ran out of sensor already.     Endo staff - can we please ensure that the form gets signed and faxed ASAP?  Thank you.

## 2024-05-24 NOTE — TELEPHONE ENCOUNTER
Received fax from [employer] dated on 05/22/24 attached is a physician form for cgm supplies, form placed in provider folder for review and signature.

## 2024-05-24 NOTE — TELEPHONE ENCOUNTER
REFILL PASSED PER [facility] PROTOCOLS    Requested Prescriptions   Pending Prescriptions Disp Refills    PANTOPRAZOLE 40 MG Oral Tab EC [Pharmacy Med Name: PANTOPRAZOLE SOD DR 40 MG TAB] 90 tablet 3     Sig: TAKE 1 TABLET BY MOUTH EVERY DAY       Gastrointestional Medication Protocol Passed - 5/22/2024 12:49 AM        Passed - In person appointment or virtual visit in the past 12 mos or appointment in next 3 mos     Recent Out[facility] Visits              [facility] weeks [facility]    3 months ago Uncontrolled type 2 diabetes mellitus with hyperglycemia ([facility])    [facility]    Office Visit    3 months ago Type 2 diabetes mellitus without complication, with long-term current use of insulin ([facility])    [facility]    [facility]    7 months ago Uncontrolled type 2 diabetes mellitus with hyperglycemia ([facility]    [facility] Visit    9 months ago Type 2 diabetes mellitus without complication, with long-term current use of insulin ([facility])    [facility]          Future Appointments         Provider Department Appt Notes    In 1 month Lulu Schofield MD [facility] F/u    In 2 months Omaira Shah MD [facility]                       AMLODIPINE 5 MG Oral Tab [Pharmacy Med Name: AMLODIPINE BESYLATE 5 MG TAB] 90 tablet 3     Sig: Take 1 tablet (5 mg total) by mouth daily.       Hypertension Medications Protocol Passed - 5/22/2024 12:49 AM        Passed - CMP or BMP in past 12 months        Passed - Last BP reading less than 140/90     BP Readings from Last 1 Encounters:   05/08/24 130/76                Passed - In person appointment or virtual visit in the past 12 mos or appointment in next 3 mos     Recent Outpatient Visits              2 weeks ago [facility]igastric [facility]    [facility]    3 months ago Uncontrolled type 2 diabetes mellitus with hyperglycemia ([facility])    [facility]    3 months ago Type 2 diabetes mellitus without complication, with long-term current use of insulin ([facility])    [facility]    [facility]    7 months ago Uncontrolled type 2 diabetes mellitus with hyperglycemia ([facility])    [facility] Lulu Schofield MD    Office Visit    9 months ago Type 2 diabetes mellitus without complication, with long-term current use of insulin ([facility])    [facility] Omaira Heath MD    Office Visit          Future Appointments         Provider Department Appt Notes    In 1 month Lulu Schofield MD [facility] F/u    In 2 months Omaira Shah MD [facility]                     Passed - EGFRCR or GFRNAA > 50     GFR Evaluation  EGFRCR: 65 , resulted on 2/12/2024               Future Appointments         Provider Department Appt Notes    In 1 month Lulu Schofield MD [facility] F/u    In 2 months Omaira Shah MD [facility]           Recent Outpatient Visits              2 weeks ago Epigastric pain    [facility]    [facility] Visit    3 months ago Uncontrolled type 2 diabetes mellitus with hyperglycemia ([facility])    [facility] Lulu Schofield MD    Office Visit    3 months ago Type 2  diabetes mellitus without complication, with long-term current use of insulin ([facility])    [facility]    [facility] Visit    7 months ago Uncontrolled type 2 diabetes mellitus with hyperglycemia ([facility])    [facility]    Office Visit    9 months ago Type 2 diabetes mellitus without complication, with long-term current use of insulin ([facility])    [facility]    [facility] Visit

## 2024-05-29 NOTE — TELEPHONE ENCOUNTER
Form was faxed on 5/24/24. Received fax from [employer] today requesting patient's LOV notes. Faxed OV note from 2/21/24 to [employer] at [phone].

## 2024-05-31 ENCOUNTER — PATIENT MESSAGE (OUTPATIENT)
Dept: ENDOCRINOLOGY CLINIC | Facility: CLINIC | Age: 69
End: 2024-05-31

## 2024-06-01 NOTE — TELEPHONE ENCOUNTER
From: Angy Partida  To: Lulu Schofield  Sent: 5/31/2024 8:10 PM CDT  Subject: Regarding sensors      Dr. Schofield I received this message today at 8 PM May 31. They are still requesting more documentation and not releasing the sensors. not sure what is going on because I spoke to someone from [facility] and they said they were sending them, but not sure when?

## 2024-06-01 NOTE — TELEPHONE ENCOUNTER
Reached out to [facility] via email to assist in getting the actual order status for this patient.  Copied all the Endo RN staff in that email.

## 2024-06-04 ENCOUNTER — TELEPHONE (OUTPATIENT)
Dept: FAMILY MEDICINE CLINIC | Facility: CLINIC | Age: 69
End: 2024-06-04

## 2024-06-04 NOTE — TELEPHONE ENCOUNTER
Received report of patient's most recent eye exam dated on 6/3/24 [facility]  Eye exam was documented in patient's 'Diabetes Flowsheet' and placed in providers folder for review.

## 2024-06-16 RX ORDER — FENOFIBRATE 200 MG/1
200 CAPSULE ORAL
Qty: 90 CAPSULE | Refills: 3 | Status: SHIPPED | OUTPATIENT
Start: 2024-06-16

## 2024-06-16 NOTE — TELEPHONE ENCOUNTER
REFILL PASSED PER [facility] PROTOCOLS    Requested Prescriptions   Pending Prescriptions Disp Refills    FENOFIBRATE MICRONIZED 200 MG Oral Cap [Pharmacy Med Name: FENOFIBRATE 200 MG CAPSULE] 90 capsule 3     Sig: TAKE 1 CAPSULE BY MOUTH EVERY MORNING BEFORE BREAKFAST.       Cholesterol Medication Protocol Passed - 6/13/2024  9:58 AM        Passed - ALT < 80     Lab Results   Component Value Date    ALT 15 02/12/2024             Passed - ALT resulted within past year        Passed - Lipid panel within past 12 months     Lab Results   Component Value Date    CHOLEST 117 02/12/2024    TRIG 147 02/12/2024    HDL 44 02/12/2024    LDL 48 02/12/2024    VLDL 21 02/12/2024    NONHDLC 73 02/12/2024             Passed - In person appointment or virtual visit in the past 12 mos or appointment in next 3 mos     Recent Outpatient Visits              1 month ago [facility]igastric [facility]    [facility]    3 months ago Uncontrolled type 2 diabetes mellitus with hyperglycemia ([facility])    [facility] Lulu Schofield MD    Office Visit    4 months ago Type 2 diabetes mellitus without complication, with long-term current use of insulin ([facility])    [facility] Omaira Shah MD    Office Visit    8 months ago Uncontrolled type 2 diabetes mellitus with hyperglycemia ([facility])    [facility] Lulu Gamez MD    Office Visit    10 months ago Type 2 diabetes mellitus without complication, with long-term current use of insulin ([facility])    [facility] Omaira Heath MD    Office Visit          Future Appointments         Provider Department Appt Notes    In 2 weeks Lulu Schofield MD [facility] F/u    In 1 month Omaira Shah MD [facility]      In 5 months ADO DEXA RM1; ADO JULIO RM1 [facility] [place]                          Future Appointments         Provider Department Appt Notes    In 2 weeks Lulu Schofield MD [facility] F/u    In 1 month Omaira Shah MD [facility]     In 5 months ADO DEXA RM1; ADO JULIO RM1 [facility] [place]           Recent Outpatient Visits              1 month ago Epigastric pain    [facility]    Office Visit    3 months ago Uncontrolled type 2 diabetes mellitus with hyperglycemia ([facility])    [facility] Lulu Schofield MD    Office Visit    4 months ago Type 2 diabetes mellitus without complication, with long-term current use of insulin ([facility])    [facility]    Office Visit    8 months ago Uncontrolled type 2 diabetes mellitus with hyperglycemia ([facility])    [facility] Lulu Schofield MD    Office Visit    10 months ago Type 2 diabetes mellitus without complication, with long-term current use of insulin ([facility])    [facility]    Office Visit

## 2024-06-27 NOTE — TELEPHONE ENCOUNTER
Received fax from [employer] [facility] CGM refill. Placed in providers folder for signature.

## 2024-06-28 NOTE — TELEPHONE ENCOUNTER
Faxed form from [employer] for CGM  to [phone]. Awaiting confirmation. Confirmation received

## 2024-07-03 ENCOUNTER — OFFICE VISIT (OUTPATIENT)
Dept: ENDOCRINOLOGY CLINIC | Facility: CLINIC | Age: 69
End: 2024-07-03
Payer: MEDICARE

## 2024-07-03 VITALS
WEIGHT: 206 LBS | BODY MASS INDEX: 39 KG/M2 | HEART RATE: 68 BPM | DIASTOLIC BLOOD PRESSURE: 77 MMHG | SYSTOLIC BLOOD PRESSURE: 120 MMHG

## 2024-07-03 DIAGNOSIS — E11.65 UNCONTROLLED TYPE 2 DIABETES MELLITUS WITH HYPERGLYCEMIA (HCC): Primary | ICD-10-CM

## 2024-07-03 DIAGNOSIS — E83.52 HYPERCALCEMIA: ICD-10-CM

## 2024-07-03 LAB
GLUCOSE BLOOD: 157
HEMOGLOBIN A1C: 7.2 % (ref 4.3–5.6)
TEST STRIP LOT #: NORMAL NUMERIC

## 2024-07-03 PROCEDURE — 99214 OFFICE O/P EST MOD 30 MIN: CPT | Performed by: INTERNAL MEDICINE

## 2024-07-03 PROCEDURE — 82947 ASSAY GLUCOSE BLOOD QUANT: CPT | Performed by: INTERNAL MEDICINE

## 2024-07-03 PROCEDURE — 83036 HEMOGLOBIN GLYCOSYLATED A1C: CPT | Performed by: INTERNAL MEDICINE

## 2024-07-03 NOTE — PROGRESS NOTES
Name: Angy Partida  Date: 7/3/2024    Referring Physician: No ref. provider found    HISTORY OF PRESENT ILLNESS   Angy Partida is a 69 year old female who presents for diabetes mellitus, diagnosed in 2001.     Prior HbA, C or glycohemoglobin were 8.6% 1/2021; 7.4% 5/2021; 7.5% 9/2021; 7.9% 1/2022; 7.1% 5/2022; 7.2% 9/2022; 7.2% 2/2023; 7.4% 5/2023; 8.0% 10/2023; 8.1% 2/2024; 7.2% POC Today     Dietary compliance: Good -->improved diet over the past few months   Exercise: Yes -->walking puppy   Polyuria/polydipsia: No  Blurred vision: No    Episodes of hypoglycemia: Yes - occ nocturnal hypoglycemia   Blood Glucose:  Checking 4 times per day  Reviewed Eladio Download      Medications for DM   Novolog 26-28 units subcutaneous TID with meals  Tresiba 68 units subcutaneous at bedtime     Metformin d/c'd due to GI upset     REVIEW OF SYSTEMS  Eyes: Diabetic retinopathy present: No            Most recent visit to eye doctor in last 12 months: No - visit scheduled for next week     CV: Cardiovascular disease present: No         Hypertension present: Yes         Hyperlipidemia present: Yes         Peripheral Vascular Disease present: No    : Nephropathy present: No    Neuro: Neuropathy present: No    Skin: Infection or ulceration: No    Osteoporosis: No    Thyroid disease: No      Medications:     Current Outpatient Medications:     fenofibrate micronized 200 MG Oral Cap, Take 1 capsule (200 mg total) by mouth before breakfast., Disp: 90 capsule, Rfl: 3    pantoprazole 40 MG Oral Tab EC, Take 1 tablet (40 mg total) by mouth daily., Disp: 90 tablet, Rfl: 3    amLODIPine 5 MG Oral Tab, Take 1 tablet (5 mg total) by mouth daily., Disp: 90 tablet, Rfl: 3    dicyclomine 10 MG Oral Cap, Take 1 capsule (10 mg total) by mouth 4 (four) times daily before meals and nightly., Disp: 120 capsule, Rfl: 2    Insulin Aspart, w/Niacinamide, (FIASP FLEXTOUCH) 100 UNIT/ML Subcutaneous Solution Pen-injector, Inject 26 Units into the  skin in the morning, at noon, and at bedtime., Disp: 60 mL, Rfl: 1    Meloxicam 15 MG Oral Tab, Take 1 tablet (15 mg total) by mouth daily., Disp: 90 tablet, Rfl: 1    lisinopril 30 MG Oral Tab, Take 1 tablet (30 mg total) by mouth daily., Disp: 90 tablet, Rfl: 3    insulin degludec (TRESIBA FLEXTOUCH) 200 UNIT/ML Subcutaneous Solution Pen-injector, Inject 68 Units into the skin nightly., Disp: 60 mL, Rfl: 1    Continuous Blood Gluc Sensor (FREESTYLE AROLDO 2 SENSOR) Does not apply Misc, 1 each every 14 (fourteen) days., Disp: 6 each, Rfl: 0    Insulin Pen Needle 32G X 6 MM Does not apply Misc, USE 4 TIMES A DAY, Disp: 400 each, Rfl: 1    ALPRAZolam 0.25 MG Oral Tab, 1 TABLET 2 TIMES DAILY AS NEEDED, Disp: 30 tablet, Rfl: 5    Insulin Syringe-Needle U-100 27G X 1/2\" 0.5 ML Does not apply Misc, Use with insulin as directed, Disp: 100 each, Rfl: 0    Accu-Chek Softclix Lancets Does not apply Misc, Check blood glucose 3 times daily, Disp: 300 each, Rfl: 3    Lancets Does not apply Misc, Accu check softclix lancets - check glucose up to 3 times per day. Dx E11.9, Disp: 200 each, Rfl: 4    Glucose Blood (ACCU-CHEK ELAINE PLUS) In Vitro Strip, Use 1 strip as directed three times daily, Disp: 300 strip, Rfl: 3    ondansetron (ZOFRAN) 4 mg tablet, Take 1 tablet (4 mg total) by mouth every 8 (eight) hours as needed for Nausea., Disp: 20 tablet, Rfl: 1    Insulin Pen Needle (PEN NEEDLES) 32G X 4 MM Does not apply Misc, 1 each every 7 days., Disp: 30 each, Rfl: 0    atorvastatin 40 MG Oral Tab, , Disp: , Rfl:     Fluticasone Propionate 50 MCG/ACT Nasal Suspension, USE 2 SPRAYS IN EACH NOSTRIL EVERY DAY, Disp: 3 Bottle, Rfl: 1    FLUAD QUADRIVALENT 0.5 ML Intramuscular Prefilled Syringe, PHARMACY ADMINISTERED, Disp: , Rfl:     DICLOFENAC SODIUM 1 % Transdermal Gel, APPLY 2 GRAMS TO AFFECTED AREA 4 TIMES A DAY, Disp: 100 g, Rfl: 0    Lancets Does not apply Misc, Test blood sugar 3 times daily., Disp: 300 each, Rfl: 3    VITAMIN D3  SUPER STRENGTH 2000 UNITS Oral Tab, TAKE ONE TABLET BY MOUTH ONE TIME DAILY , Disp: 30 tablet, Rfl: 11    Vitamin B-12 (VITAMIN B12) 1000 MCG Oral Tab, Take 1 tablet by mouth once daily., Disp: 30 tablet, Rfl: 2    Ferrous Sulfate 325 (65 Fe) MG Oral Tab, Take 1 tablet (325 mg total) by mouth daily with breakfast., Disp: , Rfl:     sertraline 25 MG Oral Tab, Take 1 tablet (25 mg total) by mouth every evening., Disp: 90 tablet, Rfl: 1     Allergies:   Allergies   Allergen Reactions    Codeine NAUSEA AND VOMITING     Abdominal pain    Hydrocodone NAUSEA ONLY    Morphine NAUSEA AND VOMITING     Abdominal pain         Social History:   Social History     Socioeconomic History    Marital status:     Number of children: 1   Occupational History    Occupation: homemaker   Tobacco Use    Smoking status: Never    Smokeless tobacco: Never   Vaping Use    Vaping status: Never Used   Substance and Sexual Activity    Alcohol use: No    Drug use: No   Other Topics Concern    Caffeine Concern No    Right Handed Yes    Currently spends a great deal of time in the sun Yes    History of tanning No    Hx of Spending Great Deal of Time in Sun Yes    Bad sunburns in the past Yes    Tanning Salons in the Past No    Hx of Radiation Treatments No       Medical History:   Past Medical History:    Anemia    Angle-closure glaucoma    Calculus of kidney    Diverticulosis of large intestine    Esophageal reflux    Eye exam, routine    [facility]     Hemorrhoids    High blood pressure    Hyperlipidemia    IBS (irritable bowel syndrome)    Migraines    Obesity    PONV (postoperative nausea and vomiting)    Rheumatoid arthritis (HCC)    Type 1 diabetes mellitus (HCC)       Surgical history:   Past Surgical History:   Procedure Laterality Date    Breast biopsy Left 2004    fibroadenoma    Colonoscopy  2007    Colonoscopy N/A 06/26/2018    Procedure: COLONOSCOPY;  Surgeon: Richard Healy MD;  Location: [facility] ENDOSCOPY    Egd   06/26/2018    Elbow fracture surgery Right 1998    Glaucoma surgery Bilateral 2012    Hysterectomy      Knee scope,med/lat menisectomy Left 2004    Laparoscopic cholecystectomy  1993    Danial localization wire 1 site left (cpt=19281)  2004    Removal of ovary/tube(s) Bilateral 11/04/2017    Laparoscopic, Ghia/Potkul    Vaginal hysterectomy  1984    Yag iridectomy - ou - both eyes      OS 5/10/12, OD 4/19/12         PHYSICAL EXAM  /82   Pulse 68   Wt 206 lb (93.4 kg)   BMI 38.92 kg/m²     General Appearance:  alert, well developed, in no acute distress  Eyes:  normal conjunctivae, sclera., normal sclera and normal pupils  Ears/Nose/Mouth/Throat/Neck:  no palpable thyroid nodules   Back: no kyphosis or back tenderness  Musculoskeletal:  normal muscle strength and tone  Skin:  normal moisture and skin texture  Hair & Nails:  normal scalp hair     Hematologic:  no excessive bruising  Neuro:  sensory grossly intact and motor grossly intact  Psychiatric:  oriented to time, self, and place  Nutritional:  no abnormal weight gain or loss    ASSESSMENT/PLAN:      1. Diabetes Mellitus Type 2, controlled  -controlled; HgA1c 7.2% -->improved   -Discussed importance of glycemic control to prevent complications of diabetes  -Discussed complications of diabetes include retinopathy, neuropathy, nephropathy and cardiovascular disease  -Discussed importance of SBGM  -Discussed importance of low CHO diet, recommend 45gm per meal or 135gm per day  -Continue Tresiba 68 units subcutaneous daily   -Increase Novolog 28 units subcutaneous QAM; 26 units subcutaneous dinner    -Discussed taking Novolog 15 minutes before meals to prevent postprandial hyperglycemia   -Continue CF 1:40>140   -Continue Eladio CGM   -BP elevated, recheck improved   -Normal lipids  -Foot exam with Dr. Shah 2/2024     2. Hypercalcemia  - New diagnosis, now improved   - Not PTH mediated  - Continue Vitamin D 2000 units daily  - labs improved, stable       Continuous Glucose Monitoring Interpretation    Angy Partida has undergone continuous glucose monitoring with the personal Freestyle Eladio.   Her blood glucose tracings were evaluated for two weeks prior to office visit.  Overall her tracings demonstrated well controlled blood glucose levels with some increased post prandial hyperglycemia.  She did not experience any severe hypoglycemia during the week of evaluation.  As a result of her testing her medication was adjusted as noted above.       RTC 4 months     7/3/2024  Lulu Schofield MD

## 2024-07-09 ENCOUNTER — TELEPHONE (OUTPATIENT)
Dept: ENDOCRINOLOGY CLINIC | Facility: CLINIC | Age: 69
End: 2024-07-09

## 2024-07-09 NOTE — TELEPHONE ENCOUNTER
Product backordered/Unavailable : Fiasp on backorder indefinitely, patient would like alternative.  Please send NEW script -Thank you.      Current Outpatient Medications   Medication Sig Dispense Refill                                       Insulin Aspart, w/Niacinamide, (FIASP FLEXTOUCH) 100 UNIT/ML Subcutaneous Solution Pen-injector Inject 26 Units into the skin in the morning, at noon, and at bedtime. 60 mL 1

## 2024-08-12 ENCOUNTER — OFFICE VISIT (OUTPATIENT)
Dept: FAMILY MEDICINE CLINIC | Facility: CLINIC | Age: 69
End: 2024-08-12
Payer: MEDICARE

## 2024-08-12 VITALS
HEART RATE: 67 BPM | SYSTOLIC BLOOD PRESSURE: 133 MMHG | BODY MASS INDEX: 40.02 KG/M2 | WEIGHT: 212 LBS | HEIGHT: 61 IN | DIASTOLIC BLOOD PRESSURE: 82 MMHG

## 2024-08-12 DIAGNOSIS — Z79.4 TYPE 2 DIABETES MELLITUS WITHOUT COMPLICATION, WITH LONG-TERM CURRENT USE OF INSULIN (HCC): Primary | ICD-10-CM

## 2024-08-12 DIAGNOSIS — E66.01 MORBID (SEVERE) OBESITY DUE TO EXCESS CALORIES (HCC): ICD-10-CM

## 2024-08-12 DIAGNOSIS — E11.9 TYPE 2 DIABETES MELLITUS WITHOUT COMPLICATION, WITH LONG-TERM CURRENT USE OF INSULIN (HCC): Primary | ICD-10-CM

## 2024-08-12 DIAGNOSIS — E55.9 VITAMIN D DEFICIENCY: ICD-10-CM

## 2024-08-12 PROCEDURE — 99214 OFFICE O/P EST MOD 30 MIN: CPT | Performed by: FAMILY MEDICINE

## 2024-08-12 RX ORDER — FLUTICASONE PROPIONATE 50 MCG
SPRAY, SUSPENSION (ML) NASAL
Qty: 3 EACH | Refills: 1 | Status: SHIPPED | OUTPATIENT
Start: 2024-08-12

## 2024-08-12 NOTE — PROGRESS NOTES
Angy Partida is a 69 year old female.   Chief Complaint   Patient presents with    Follow - Up     6 month      HPI:   Reports waking up with some bite marks on upper chest. Does spend time outside. Average glucose is 157. Time in target 69% in target. Sometimes going low at night.   Tends to have dinner late and an hour later it is low. Been in garden a lot.   Current Outpatient Medications on File Prior to Visit   Medication Sig Dispense Refill    insulin aspart (NOVOLOG FLEXPEN) 100 Units/mL Subcutaneous Solution Pen-injector Inject 26 Units into the skin 3 (three) times daily before meals. 45 mL 1    fenofibrate micronized 200 MG Oral Cap Take 1 capsule (200 mg total) by mouth before breakfast. 90 capsule 3    pantoprazole 40 MG Oral Tab EC Take 1 tablet (40 mg total) by mouth daily. 90 tablet 3    amLODIPine 5 MG Oral Tab Take 1 tablet (5 mg total) by mouth daily. 90 tablet 3    dicyclomine 10 MG Oral Cap Take 1 capsule (10 mg total) by mouth 4 (four) times daily before meals and nightly. 120 capsule 2    Meloxicam 15 MG Oral Tab Take 1 tablet (15 mg total) by mouth daily. 90 tablet 1    lisinopril 30 MG Oral Tab Take 1 tablet (30 mg total) by mouth daily. 90 tablet 3    insulin degludec (TRESIBA FLEXTOUCH) 200 UNIT/ML Subcutaneous Solution Pen-injector Inject 68 Units into the skin nightly. 60 mL 1    ALPRAZolam 0.25 MG Oral Tab 1 TABLET 2 TIMES DAILY AS NEEDED 30 tablet 5    ondansetron (ZOFRAN) 4 mg tablet Take 1 tablet (4 mg total) by mouth every 8 (eight) hours as needed for Nausea. 20 tablet 1    atorvastatin 40 MG Oral Tab       Fluticasone Propionate 50 MCG/ACT Nasal Suspension USE 2 SPRAYS IN EACH NOSTRIL EVERY DAY 3 Bottle 1    DICLOFENAC SODIUM 1 % Transdermal Gel APPLY 2 GRAMS TO AFFECTED AREA 4 TIMES A  g 0    VITAMIN D3 SUPER STRENGTH 2000 UNITS Oral Tab TAKE ONE TABLET BY MOUTH ONE TIME DAILY  30 tablet 11    Vitamin B-12 (VITAMIN B12) 1000 MCG Oral Tab Take 1 tablet by mouth once  daily. 30 tablet 2    Ferrous Sulfate 325 (65 Fe) MG Oral Tab Take 1 tablet (325 mg total) by mouth daily with breakfast.      Insulin Aspart, w/Niacinamide, (FIASP FLEXTOUCH) 100 UNIT/ML Subcutaneous Solution Pen-injector Inject 26 Units into the skin in the morning, at noon, and at bedtime. (Patient not taking: Reported on 8/12/2024) 60 mL 1    Continuous Blood Gluc Sensor (FREESTYLE AROLDO 2 SENSOR) Does not apply Misc 1 each every 14 (fourteen) days. 6 each 0    Insulin Pen Needle 32G X 6 MM Does not apply Misc USE 4 TIMES A  each 1    Insulin Syringe-Needle U-100 27G X 1/2\" 0.5 ML Does not apply Misc Use with insulin as directed 100 each 0    Accu-Chek Softclix Lancets Does not apply Misc Check blood glucose 3 times daily 300 each 3    Lancets Does not apply Misc Accu check softclix lancets - check glucose up to 3 times per day. Dx E11.9 200 each 4    Glucose Blood (ACCU-CHEK ELAINE PLUS) In Vitro Strip Use 1 strip as directed three times daily 300 strip 3    Insulin Pen Needle (PEN NEEDLES) 32G X 4 MM Does not apply Misc 1 each every 7 days. 30 each 0    FLUAD QUADRIVALENT 0.5 ML Intramuscular Prefilled Syringe PHARMACY ADMINISTERED      Lancets Does not apply Misc Test blood sugar 3 times daily. 300 each 3     Current Facility-Administered Medications on File Prior to Visit   Medication Dose Route Frequency Provider Last Rate Last Admin    hyaluronic acid (SYNVISC-ONE) injection  6 mL Intra-articular Once Hector Cordero MD          Past Medical History:    Allergic rhinitis    Anemia    Angle-closure glaucoma    Anxiety    Calculus of kidney    Diabetes (HCC)    Diverticulosis of large intestine    Esophageal reflux    Essential hypertension    Eye exam, routine    [facility]     Hemorrhoids    High blood pressure    Hyperlipidemia    IBS (irritable bowel syndrome)    Migraines    Obesity    Osteoarthritis    PONV (postoperative nausea and vomiting)    Rheumatoid arthritis (HCC)    Type 1  diabetes mellitus (HCC)      Social History:  Social History     Socioeconomic History    Marital status:     Number of children: 1   Occupational History    Occupation: homemaker   Tobacco Use    Smoking status: Never    Smokeless tobacco: Never   Vaping Use    Vaping status: Never Used   Substance and Sexual Activity    Alcohol use: No    Drug use: No   Other Topics Concern    Caffeine Concern No    Right Handed Yes    Currently spends a great deal of time in the sun Yes    History of tanning No    Hx of Spending Great Deal of Time in Sun Yes    Bad sunburns in the past Yes    Tanning Salons in the Past No    Hx of Radiation Treatments No        REVIEW OF SYSTEMS:   Review of Systems   See HPI     EXAM:   /82   Pulse 67   Ht 5' 1\" (1.549 m)   Wt 212 lb (96.2 kg)   BMI 40.06 kg/m²   Physical Exam  Constitutional:       Appearance: Normal appearance.   Cardiovascular:      Rate and Rhythm: Normal rate and regular rhythm.   Pulmonary:      Effort: Pulmonary effort is normal.      Breath sounds: Normal breath sounds.   Skin:     Comments: Left upper chest wall and left breast and left upper back. Erythematous papular   Neurological:      Mental Status: She is alert and oriented to person, place, and time.   Psychiatric:         Behavior: Behavior normal.          ASSESSMENT AND PLAN:   1. Type 2 diabetes mellitus without complication, with long-term current use of insulin (HCC)  Cut back on evening dose by 4 units.   - CBC With Differential With Platelet; Future  - Comp Metabolic Panel (14); Future  - Lipid Panel; Future  - TSH W Reflex To Free T4; Future  - Vitamin B12; Future  - Vitamin D; Future  - Microalb/Creat Ratio, Random Urine; Future    2. Morbid (severe) obesity due to excess calories (HCC)  Contributing to diabetes and HTN but stable.     3. Vitamin D deficiency    - Vitamin D; Future        The patient indicates understanding of these issues and agrees to the plan.      Omaria Shah,  MD  8/12/2024  9:08 AM

## 2024-08-29 ENCOUNTER — PATIENT MESSAGE (OUTPATIENT)
Dept: ENDOCRINOLOGY CLINIC | Facility: CLINIC | Age: 69
End: 2024-08-29

## 2024-08-30 NOTE — TELEPHONE ENCOUNTER
From: Angy Partida  To: Lulu Schofield  Sent: 8/29/2024 5:37 PM CDT  Subject: [facility] will not send sensors    Dr. Schofield, unfortunately, once again, I am put in the difficult situation of not receiving my continuous glucose monitor sensors because [facility] would like to know when I was last seen in your office and they need documentation and chart notes if someone could please contact them immediately so that I may receive my sensors I am down to 10 days left The last time I was in your office, I believe it was July 3 Please contact them ,Thank you so much, Angy Partida

## 2024-09-05 NOTE — TELEPHONE ENCOUNTER
Spoke with [facility]. Nothing further is needed from our office, everything is all set. They will ship the sensors shortly.

## 2024-09-26 RX ORDER — MELOXICAM 15 MG/1
15 TABLET ORAL DAILY
Qty: 90 TABLET | Refills: 1 | Status: SHIPPED | OUTPATIENT
Start: 2024-09-26

## 2024-09-26 NOTE — TELEPHONE ENCOUNTER
Refill passes per [facility] protocol.    Future Appointments   Date Time Provider Department Center   2025  9:00 AM Omaira Shah MD [facility]  [facility]     Last office visit: 2024    Meloxicam prescription sent 3/28/24, prescription     Requested Prescriptions   Pending Prescriptions Disp Refills    MELOXICAM 15 MG Oral Tab [Pharmacy Med Name: MELOXICAM 15 MG TABLET] 90 tablet 1     Sig: Take 1 tablet (15 mg total) by mouth daily.       Non-Narcotic Pain Medication Protocol Passed - 2024  7:22 AM        Passed - In person appointment or virtual visit in the past 6 mos or appointment in next 3 mos     Recent Outpatient Visits              1 month ago Type 2 diabetes mellitus without complication, with long-term current use of insulin ([facility])    [facility] Omaira Heath MD    Office Visit    2 months ago Uncontrolled type 2 diabetes mellitus with hyperglycemia ([facility])    [facility] Lulu Schofield MD    Office Visit    4 months ago Epigastric pain    [facility] Omaira Shah MD    Office Visit    7 months ago Uncontrolled type 2 diabetes mellitus with hyperglycemia ([facility])    [facility] Lulu Schofield MD    Office Visit    7 months ago Type 2 diabetes mellitus without complication, with long-term current use of insulin ([facility])    [facility] Omaira Shah MD    Office Visit          Future Appointments         Provider Department Appt Notes    In 1 month [facility] DEXA RM1; [facility] JULIO RM1 [facility]     In 1 month Lulu Schofield MD [facility] Return in about 4 months (around 11/3/2024).    In 4 months Omaira Shah MD [facility]                          Future Appointments          Provider Department Appt Notes    In 1 month ADO DEXA RM1; ADO JULIO RM1 [facility]     In 1 month Lulu Schofield MD [facility] Return in about 4 months (around 11/3/2024).    In 4 months Omaira Shah MD [facility]           Recent Outpatient Visits              1 month ago Type 2 diabetes mellitus without complication, with long-term current use of insulin ([facility])    [facility] Omaira Shah MD    Office Visit    2 months ago Uncontrolled type 2 diabetes mellitus with hyperglycemia ([facility])    [facility] Lulu Schofield MD    Office Visit    4 months [facility]    7 months ago Uncontrolled type 2 diabetes mellitus with hyperglycemia ([facility])    [facility] Lulu Schofield MD    Office Visit    7 months ago Type 2 diabetes mellitus without complication, with long-term current use of insulin ([facility])    [facility] Omaira Shah MD    Office Visit

## 2024-11-18 ENCOUNTER — HOSPITAL ENCOUNTER (OUTPATIENT)
Dept: MAMMOGRAPHY | Age: 69
Discharge: HOME | End: 2024-11-18
Attending: FAMILY MEDICINE
Payer: MEDICARE

## 2024-11-18 DIAGNOSIS — Z12.31 SCREENING MAMMOGRAM FOR BREAST CANCER: ICD-10-CM

## 2024-11-18 PROCEDURE — 77067 SCR MAMMO BI INCL CAD: CPT | Performed by: FAMILY MEDICINE

## 2024-11-18 PROCEDURE — 77063 BREAST TOMOSYNTHESIS BI: CPT | Performed by: FAMILY MEDICINE

## 2024-11-19 RX ORDER — PEN NEEDLE, DIABETIC 32 GX 1/4"
1 NEEDLE, DISPOSABLE MISCELLANEOUS 4 TIMES DAILY
Qty: 400 EACH | Refills: 1 | Status: SHIPPED | OUTPATIENT
Start: 2024-11-19

## 2024-11-19 NOTE — TELEPHONE ENCOUNTER
Endocrine Refill protocol for Glucose testing supplies     Protocol Criteria: PASSED Reason: N/A    If below requirement is met, send a 90-day supply with 1 refill per provider protocol.    Verify appointment with Endocrinology completed in the last 6 months or scheduled in the next 3 months.    Last completed office visit: 7/3/2024 Lulu Schofield MD   Next scheduled Follow up:   Future Appointments   Date Time Provider Department Center   11/20/2024 11:00 AM Lulu Schofield MD [facility]

## 2024-11-20 ENCOUNTER — OFFICE VISIT (OUTPATIENT)
Dept: ENDOCRINOLOGY CLINIC | Facility: CLINIC | Age: 69
End: 2024-11-20
Payer: MEDICARE

## 2024-11-20 VITALS
HEART RATE: 71 BPM | WEIGHT: 212 LBS | BODY MASS INDEX: 40 KG/M2 | DIASTOLIC BLOOD PRESSURE: 73 MMHG | SYSTOLIC BLOOD PRESSURE: 120 MMHG

## 2024-11-20 DIAGNOSIS — E11.65 UNCONTROLLED TYPE 2 DIABETES MELLITUS WITH HYPERGLYCEMIA (HCC): Primary | ICD-10-CM

## 2024-11-20 LAB
GLUCOSE BLOOD: 203
HEMOGLOBIN A1C: 7.4 % (ref 4.3–5.6)
TEST STRIP LOT #: NORMAL NUMERIC

## 2024-11-20 PROCEDURE — 83036 HEMOGLOBIN GLYCOSYLATED A1C: CPT | Performed by: INTERNAL MEDICINE

## 2024-11-20 PROCEDURE — 95251 CONT GLUC MNTR ANALYSIS I&R: CPT | Performed by: INTERNAL MEDICINE

## 2024-11-20 PROCEDURE — 82947 ASSAY GLUCOSE BLOOD QUANT: CPT | Performed by: INTERNAL MEDICINE

## 2024-11-20 PROCEDURE — 99214 OFFICE O/P EST MOD 30 MIN: CPT | Performed by: INTERNAL MEDICINE

## 2024-11-20 RX ORDER — INSULIN DEGLUDEC 100 U/ML
40 INJECTION, SOLUTION SUBCUTANEOUS NIGHTLY
Qty: 45 ML | Refills: 1 | Status: SHIPPED | OUTPATIENT
Start: 2024-11-20

## 2024-11-20 RX ORDER — INSULIN ASPART 100 [IU]/ML
26 INJECTION, SOLUTION INTRAVENOUS; SUBCUTANEOUS 3 TIMES DAILY
Qty: 60 ML | Refills: 1 | Status: SHIPPED | OUTPATIENT
Start: 2024-11-20

## 2024-11-20 NOTE — PATIENT INSTRUCTIONS
CONTINUE Tresiba 40 units subcutaneous daily    IF glucose is under 100 at bedtime ok to decrease to 20 units subcutaneous daily     DO NOT skip Tresiba (degludec)     CONTINUE current dose of Fiasp or Aspart

## 2024-11-20 NOTE — PROGRESS NOTES
Name: Angy Partida  YOB: 1955  Report Period: 10/24/2024 - 11/20/2024 (28 days)  Generated: 11/20/2024  % Time CGM Active: 98%      Glucose Statistics and Targets  Average Glucose: 180 mg/dL  Glucose Management Indicator (GMI): 7.6%  Glucose Variability (%CV): 22.3%  Target Range: 70 - 180 mg/dL      Time in Ranges  Very High: >250 mg/dL --- 5%  High: 181 - 250 mg/dL --- 45%  Target Range: 70 - 180 mg/dL --- 50%  Low: 54 - 69 mg/dL --- 0%  Very Low: <54 mg/dL --- 0%

## 2024-11-20 NOTE — PROGRESS NOTES
Name: Angy Partida  Date: 11/20/2024    Referring Physician: No ref. provider found    HISTORY OF PRESENT ILLNESS   Angy Partida is a 69 year old female who presents for diabetes mellitus, diagnosed in 2001.     Prior HbA, C or glycohemoglobin were 8.6% 1/2021; 7.4% 5/2021; 7.5% 9/2021; 7.9% 1/2022; 7.1% 5/2022; 7.2% 9/2022; 7.2% 2/2023; 7.4% 5/2023; 8.0% 10/2023; 8.1% 2/2024; 7.2% 7/2024; 7.4% POC Today     Dietary compliance: Good -->improved diet over the past few months   Exercise: Yes -->walking puppy   Polyuria/polydipsia: No  Blurred vision: No    Episodes of hypoglycemia: Yes - occ nocturnal hypoglycemia   Blood Glucose:  Checking 4 times per day  Reviewed Eladio Download -->time in range 47%     Medications for DM   Fiasp 26-28 units subcutaneous TID with meals  Tresiba 30-40 units subcutaneous at bedtime -->skipping if glucose low     Metformin d/c'd due to GI upset     REVIEW OF SYSTEMS  Eyes: Diabetic retinopathy present: No            Most recent visit to eye doctor in last 12 months: No - visit scheduled for next week     CV: Cardiovascular disease present: No         Hypertension present: Yes         Hyperlipidemia present: Yes         Peripheral Vascular Disease present: No    : Nephropathy present: No    Neuro: Neuropathy present: No    Skin: Infection or ulceration: No    Osteoporosis: No    Thyroid disease: No      Medications:     Current Outpatient Medications:     Insulin Pen Needle (BD PEN NEEDLE MICRO U/F) 32G X 6 MM Does not apply Misc, USE AS DIRECTED 4 TIMES A DAY, Disp: 400 each, Rfl: 1    Meloxicam 15 MG Oral Tab, Take 1 tablet (15 mg total) by mouth daily., Disp: 90 tablet, Rfl: 1    fluticasone propionate 50 MCG/ACT Nasal Suspension, USE 2 SPRAYS IN EACH NOSTRIL EVERY DAY, Disp: 3 each, Rfl: 1    insulin aspart (NOVOLOG FLEXPEN) 100 Units/mL Subcutaneous Solution Pen-injector, Inject 26 Units into the skin 3 (three) times daily before meals., Disp: 45 mL, Rfl: 1    fenofibrate  micronized 200 MG Oral Cap, Take 1 capsule (200 mg total) by mouth before breakfast., Disp: 90 capsule, Rfl: 3    pantoprazole 40 MG Oral Tab EC, Take 1 tablet (40 mg total) by mouth daily., Disp: 90 tablet, Rfl: 3    amLODIPine 5 MG Oral Tab, Take 1 tablet (5 mg total) by mouth daily., Disp: 90 tablet, Rfl: 3    dicyclomine 10 MG Oral Cap, Take 1 capsule (10 mg total) by mouth 4 (four) times daily before meals and nightly., Disp: 120 capsule, Rfl: 2    lisinopril 30 MG Oral Tab, Take 1 tablet (30 mg total) by mouth daily., Disp: 90 tablet, Rfl: 3    insulin degludec (TRESIBA FLEXTOUCH) 200 UNIT/ML Subcutaneous Solution Pen-injector, Inject 68 Units into the skin nightly., Disp: 60 mL, Rfl: 1    Continuous Blood Gluc Sensor (FREESTYLE AROLDO 2 SENSOR) Does not apply Misc, 1 each every 14 (fourteen) days., Disp: 6 each, Rfl: 0    ALPRAZolam 0.25 MG Oral Tab, 1 TABLET 2 TIMES DAILY AS NEEDED, Disp: 30 tablet, Rfl: 5    Insulin Syringe-Needle U-100 27G X 1/2\" 0.5 ML Does not apply Misc, Use with insulin as directed, Disp: 100 each, Rfl: 0    Accu-Chek Softclix Lancets Does not apply Misc, Check blood glucose 3 times daily, Disp: 300 each, Rfl: 3    Lancets Does not apply Misc, Accu check softclix lancets - check glucose up to 3 times per day. Dx E11.9, Disp: 200 each, Rfl: 4    Glucose Blood (ACCU-CHEK ELAINE PLUS) In Vitro Strip, Use 1 strip as directed three times daily, Disp: 300 strip, Rfl: 3    ondansetron (ZOFRAN) 4 mg tablet, Take 1 tablet (4 mg total) by mouth every 8 (eight) hours as needed for Nausea., Disp: 20 tablet, Rfl: 1    Insulin Pen Needle (PEN NEEDLES) 32G X 4 MM Does not apply Misc, 1 each every 7 days., Disp: 30 each, Rfl: 0    atorvastatin 40 MG Oral Tab, , Disp: , Rfl:     FLUAD QUADRIVALENT 0.5 ML Intramuscular Prefilled Syringe, PHARMACY ADMINISTERED, Disp: , Rfl:     DICLOFENAC SODIUM 1 % Transdermal Gel, APPLY 2 GRAMS TO AFFECTED AREA 4 TIMES A DAY, Disp: 100 g, Rfl: 0    Lancets Does not apply  Misc, Test blood sugar 3 times daily., Disp: 300 each, Rfl: 3    VITAMIN D3 SUPER STRENGTH 2000 UNITS Oral Tab, TAKE ONE TABLET BY MOUTH ONE TIME DAILY , Disp: 30 tablet, Rfl: 11    Vitamin B-12 (VITAMIN B12) 1000 MCG Oral Tab, Take 1 tablet by mouth once daily., Disp: 30 tablet, Rfl: 2    Ferrous Sulfate 325 (65 Fe) MG Oral Tab, Take 1 tablet (325 mg total) by mouth daily with breakfast., Disp: , Rfl:     Insulin Aspart, w/Niacinamide, (FIASP FLEXTOUCH) 100 UNIT/ML Subcutaneous Solution Pen-injector, Inject 26 Units into the skin in the morning, at noon, and at bedtime. (Patient not taking: Reported on 11/20/2024), Disp: 60 mL, Rfl: 1     Allergies:   Allergies   Allergen Reactions    Codeine NAUSEA AND VOMITING     Abdominal pain    Hydrocodone NAUSEA ONLY    Morphine NAUSEA AND VOMITING     Abdominal pain         Social History:   Social History     Socioeconomic History    Marital status:     Number of children: 1   Occupational History    Occupation: homemaker   Tobacco Use    Smoking status: Never    Smokeless tobacco: Never   Vaping Use    Vaping status: Never Used   Substance and Sexual Activity    Alcohol use: No    Drug use: No   Other Topics Concern    Caffeine Concern No    Right Handed Yes    Currently spends a great deal of time in the sun Yes    History of tanning No    Hx of Spending Great Deal of Time in Sun Yes    Bad sunburns in the past Yes    Tanning Salons in the Past No    Hx of Radiation Treatments No       Medical History:   Past Medical History:    Allergic rhinitis    Anemia    Angle-closure glaucoma    Anxiety    Calculus of kidney    Diabetes (HCC)    Diverticulosis of large intestine    Esophageal reflux    Essential hypertension    Eye exam, routine    [facility]     Hemorrhoids    High blood pressure    Hyperlipidemia    IBS (irritable bowel syndrome)    Migraines    Obesity    Osteoarthritis    PONV (postoperative nausea and vomiting)    Rheumatoid arthritis (HCC)    Type  1 diabetes mellitus (HCC)       Surgical history:   Past Surgical History:   Procedure Laterality Date    Breast biopsy Left     fibroadenoma    Cholecystectomy      Colonoscopy      Colonoscopy N/A 2018    Procedure: COLONOSCOPY;  Surgeon: Richard Healy MD;  Location: [facility] ENDOSCOPY    D & c      Egd  2018    Elbow fracture surgery Right     Glaucoma surgery Bilateral     Hysterectomy      Knee scope,med/lat menisectomy Left     Laparoscopic cholecystectomy      Danial localization wire 1 site left (cpt=19281)        1979    Other surgical history  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    Broken elbow bone chips & knee surgery left knee    Removal of ovary/tube(s) Bilateral 2017    Laparoscopic, Ghia/Potkul    Vaginal hysterectomy      Yag iridectomy - ou - both eyes      OS 5/10/12, OD 12         PHYSICAL EXAM  /73   Pulse 71   Wt 212 lb (96.2 kg)   BMI 40.06 kg/m²     General Appearance:  alert, well developed, in no acute distress  Eyes:  normal conjunctivae, sclera., normal sclera and normal pupils  Ears/Nose/Mouth/Throat/Neck:  no palpable thyroid nodules   Back: no kyphosis or back tenderness  Musculoskeletal:  normal muscle strength and tone  Skin:  normal moisture and skin texture  Hair & Nails:  normal scalp hair     Hematologic:  no excessive bruising  Neuro:  sensory grossly intact and motor grossly intact  Psychiatric:  oriented to time, self, and place  Nutritional:  no abnormal weight gain or loss    ASSESSMENT/PLAN:      1. Diabetes Mellitus Type 2, controlled  -controlled; HgA1c 7.2% -->improved   -Discussed importance of glycemic control to prevent complications of diabetes  -Discussed complications of diabetes include retinopathy, neuropathy, nephropathy and cardiovascular disease  -Discussed importance of SBGM  -Discussed importance of low CHO diet, recommend 45gm per meal or 135gm per day  -Decrease Tresiba to 40 units  subcutaneous daily; ok to decrease to 20 units if Glucose less than 100 at night  -Discussed importance of not skipping dose entirely   -Continue Novolog 28 units subcutaneous QAM; 26 units subcutaneous dinner    -Discussed taking Novolog 15 minutes before meals to prevent postprandial hyperglycemia   -Continue CF 1:40>140   -Continue Eladio CGM   -Normotensive   -Normal lipids  -Foot exam with Dr. Shah 2/2024     2. Hypercalcemia  - New diagnosis, now improved   - Not PTH mediated  - Continue Vitamin D 2000 units daily  - labs improved, stable - recheck per DR. Shah before physical in January     Continuous Glucose Monitoring Interpretation    Angy Partida has undergone continuous glucose monitoring with the personal Freestyle Eladio.   Her blood glucose tracings were evaluated for two weeks prior to office visit.  Overall her tracings demonstrated well controlled blood glucose levels with some increased post prandial hyperglycemia.  She did not experience any severe hypoglycemia during the week of evaluation.  As a result of her testing her medication was adjusted as noted above.       RTC 4 months     11/20/2024  Lulu Schofield MD

## 2024-12-07 ENCOUNTER — PATIENT MESSAGE (OUTPATIENT)
Dept: ENDOCRINOLOGY CLINIC | Facility: CLINIC | Age: 69
End: 2024-12-07

## 2024-12-09 NOTE — TELEPHONE ENCOUNTER
Endocrine Refill protocol for CGM supplies     Protocol Criteria:  PASSED     If below requirement is met, send a 90-day supply with 1 refill per provider protocol.     Verify appointment with Endocrinology completed in the last 12 months or scheduled in the next 6 months     Last completed office visit:11/20/2024 Lulu Schofield MD   Next scheduled Follow up:   Future Appointments   Date Time Provider Department Center   2/12/2025  9:00 AM Omaira Shah MD [facility]   3/26/2025 10:15 AM Lulu Schofield MD [facility]     [place] order signed with attached chart notes form [facility]ahn[facility] 2 cgm supplies.

## 2025-01-24 ENCOUNTER — PATIENT MESSAGE (OUTPATIENT)
Dept: ENDOCRINOLOGY CLINIC | Facility: CLINIC | Age: 70
End: 2025-01-24

## 2025-02-06 RX ORDER — FLUTICASONE PROPIONATE 50 MCG
SPRAY, SUSPENSION (ML) NASAL
Qty: 48 ML | Refills: 1 | Status: SHIPPED | OUTPATIENT
Start: 2025-02-06

## 2025-02-06 NOTE — TELEPHONE ENCOUNTER
Refill passed per [facility] protocol.    Requested Prescriptions   Pending Prescriptions Disp Refills    FLUTICASONE PROPIONATE 50 MCG/ACT Nasal Suspension [Pharmacy Med Name: FLUTICASONE PROP 50 MCG SPRAY] 48 mL 1     Sig: SPRAY 2 SPRAYS INTO EACH NOSTRIL EVERY DAY       Allergy Medication Protocol Passed - 2/6/2025  8:55 AM        Passed - In person appointment or virtual visit in the past 12 mos or appointment in next 3 mos     Recent Outpatient Visits              2 months ago Uncontrolled type 2 diabetes mellitus with hyperglycemia [facility]    5 months ago Type 2 diabetes mellitus without complication, with long-term current use of insulin ([facility])    [facility] Ros[facility]    Office Visit    7 months ago Uncontrolled type 2 diabetes mellitus with hyperglycemia ([facility]    [facility] Visit    9 months ago Epigastric pain    [facility] [place] [facility]    Office Visit    11 months ago Uncontrolled type 2 diabetes mellitus with hyperglycemia ([facility]          Future Appointments         Provider Department Appt Notes    In 6 days Omaira Shah MD [facility]     In 1 month Lulu Schofield MD [facility] Return in about 4 months (around 3/20/2025).                    Passed - Medication is active on med list             Recent Outpatient Visits              2 months ago Uncontrolled type 2 diabetes mellitus with hyperglycemia ([facility]    Office Visit    5 months ago Type 2 diabetes mellitus without complication, with long-term current use  of insulin ([facility])    [facility] Omaira Shah MD    Office Visit    7 months ago Uncontrolled type 2 diabetes mellitus with hyperglycemia ([facility])    [facility] Lulu Schofield MD    Office Visit    9 months ago [facility]igastric pain    [facility]    Office Visit    11 months ago Uncontrolled type 2 diabetes mellitus with hyperglycemia ([facility])    [facility] Lulu Schofield MD    Office Visit            Future Appointments         Provider Department Appt Notes    In 6 days Omaira Shah MD [facility]     In 1 month Lulu Schofield MD [facility] Return in about 4 months (around 3/20/2025).

## 2025-02-12 ENCOUNTER — OFFICE VISIT (OUTPATIENT)
Dept: FAMILY MEDICINE CLINIC | Facility: CLINIC | Age: 70
End: 2025-02-12

## 2025-02-12 ENCOUNTER — LAB ENCOUNTER (OUTPATIENT)
Dept: LAB | Age: 70
End: 2025-02-12
Attending: FAMILY MEDICINE
Payer: MEDICARE

## 2025-02-12 VITALS
WEIGHT: 215 LBS | BODY MASS INDEX: 40.59 KG/M2 | HEIGHT: 61 IN | SYSTOLIC BLOOD PRESSURE: 130 MMHG | DIASTOLIC BLOOD PRESSURE: 75 MMHG | HEART RATE: 70 BPM

## 2025-02-12 DIAGNOSIS — Z79.4 TYPE 2 DIABETES MELLITUS WITHOUT COMPLICATION, WITH LONG-TERM CURRENT USE OF INSULIN (HCC): ICD-10-CM

## 2025-02-12 DIAGNOSIS — Z78.0 POST-MENOPAUSAL: ICD-10-CM

## 2025-02-12 DIAGNOSIS — E11.9 TYPE 2 DIABETES MELLITUS WITHOUT COMPLICATION, WITH LONG-TERM CURRENT USE OF INSULIN (HCC): ICD-10-CM

## 2025-02-12 DIAGNOSIS — E11.3291 MILD NONPROLIFERATIVE DIABETIC RETINOPATHY OF RIGHT EYE WITHOUT MACULAR EDEMA ASSOCIATED WITH TYPE 2 DIABETES MELLITUS (HCC): Primary | ICD-10-CM

## 2025-02-12 DIAGNOSIS — E55.9 VITAMIN D DEFICIENCY: ICD-10-CM

## 2025-02-12 DIAGNOSIS — H04.123 DRY EYE SYNDROME OF BILATERAL LACRIMAL GLANDS: ICD-10-CM

## 2025-02-12 DIAGNOSIS — I35.0 NONRHEUMATIC AORTIC VALVE STENOSIS: ICD-10-CM

## 2025-02-12 DIAGNOSIS — I10 ESSENTIAL HYPERTENSION, BENIGN: ICD-10-CM

## 2025-02-12 DIAGNOSIS — Z12.31 SCREENING MAMMOGRAM FOR BREAST CANCER: ICD-10-CM

## 2025-02-12 DIAGNOSIS — E78.2 MIXED HYPERLIPIDEMIA: ICD-10-CM

## 2025-02-12 DIAGNOSIS — E66.01 MORBID (SEVERE) OBESITY DUE TO EXCESS CALORIES (HCC): ICD-10-CM

## 2025-02-12 LAB
ALBUMIN SERPL-MCNC: 4.3 G/DL (ref 3.2–4.8)
ALBUMIN/GLOB SERPL: 1.8 {RATIO} (ref 1–2)
ALP LIVER SERPL-CCNC: 41 U/L
ALT SERPL-CCNC: 15 U/L
ANION GAP SERPL CALC-SCNC: 8 MMOL/L (ref 0–18)
AST SERPL-CCNC: 23 U/L (ref ?–34)
BASOPHILS # BLD AUTO: 0.06 X10(3) UL (ref 0–0.2)
BASOPHILS NFR BLD AUTO: 0.7 %
BILIRUB SERPL-MCNC: 0.4 MG/DL (ref 0.2–1.1)
BUN BLD-MCNC: 21 MG/DL (ref 9–23)
BUN/CREAT SERPL: 20.6 (ref 10–20)
CALCIUM BLD-MCNC: 9.6 MG/DL (ref 8.7–10.4)
CHLORIDE SERPL-SCNC: 105 MMOL/L (ref 98–112)
CHOLEST SERPL-MCNC: 123 MG/DL (ref ?–200)
CO2 SERPL-SCNC: 28 MMOL/L (ref 21–32)
CREAT BLD-MCNC: 1.02 MG/DL
CREAT UR-SCNC: 252 MG/DL
DEPRECATED RDW RBC AUTO: 40.9 FL (ref 35.1–46.3)
EGFRCR SERPLBLD CKD-EPI 2021: 58 ML/MIN/1.73M2 (ref 60–?)
EOSINOPHIL # BLD AUTO: 0.23 X10(3) UL (ref 0–0.7)
EOSINOPHIL NFR BLD AUTO: 2.8 %
ERYTHROCYTE [DISTWIDTH] IN BLOOD BY AUTOMATED COUNT: 12.2 % (ref 11–15)
FASTING PATIENT LIPID ANSWER: YES
FASTING STATUS PATIENT QL REPORTED: YES
GLOBULIN PLAS-MCNC: 2.4 G/DL (ref 2–3.5)
GLUCOSE BLD-MCNC: 197 MG/DL (ref 70–99)
HCT VFR BLD AUTO: 39.5 %
HDLC SERPL-MCNC: 45 MG/DL (ref 40–59)
HGB BLD-MCNC: 12.7 G/DL
IMM GRANULOCYTES # BLD AUTO: 0.06 X10(3) UL (ref 0–1)
IMM GRANULOCYTES NFR BLD: 0.7 %
LDLC SERPL CALC-MCNC: 57 MG/DL (ref ?–100)
LYMPHOCYTES # BLD AUTO: 2.08 X10(3) UL (ref 1–4)
LYMPHOCYTES NFR BLD AUTO: 25.1 %
MCH RBC QN AUTO: 29.3 PG (ref 26–34)
MCHC RBC AUTO-ENTMCNC: 32.2 G/DL (ref 31–37)
MCV RBC AUTO: 91.2 FL
MICROALBUMIN UR-MCNC: 0.6 MG/DL
MICROALBUMIN/CREAT 24H UR-RTO: 2.4 UG/MG (ref ?–30)
MONOCYTES # BLD AUTO: 0.39 X10(3) UL (ref 0.1–1)
MONOCYTES NFR BLD AUTO: 4.7 %
NEUTROPHILS # BLD AUTO: 5.47 X10 (3) UL (ref 1.5–7.7)
NEUTROPHILS # BLD AUTO: 5.47 X10(3) UL (ref 1.5–7.7)
NEUTROPHILS NFR BLD AUTO: 66 %
NONHDLC SERPL-MCNC: 78 MG/DL (ref ?–130)
OSMOLALITY SERPL CALC.SUM OF ELEC: 300 MOSM/KG (ref 275–295)
PLATELET # BLD AUTO: 356 10(3)UL (ref 150–450)
POTASSIUM SERPL-SCNC: 4.5 MMOL/L (ref 3.5–5.1)
PROT SERPL-MCNC: 6.7 G/DL (ref 5.7–8.2)
RBC # BLD AUTO: 4.33 X10(6)UL
SODIUM SERPL-SCNC: 141 MMOL/L (ref 136–145)
TRIGL SERPL-MCNC: 119 MG/DL (ref 30–149)
TSI SER-ACNC: 2.92 UIU/ML (ref 0.55–4.78)
VIT B12 SERPL-MCNC: 650 PG/ML (ref 211–911)
VIT D+METAB SERPL-MCNC: 30.4 NG/ML (ref 30–100)
VLDLC SERPL CALC-MCNC: 17 MG/DL (ref 0–30)
WBC # BLD AUTO: 8.3 X10(3) UL (ref 4–11)

## 2025-02-12 PROCEDURE — 80053 COMPREHEN METABOLIC PANEL: CPT

## 2025-02-12 PROCEDURE — 80061 LIPID PANEL: CPT

## 2025-02-12 PROCEDURE — 82607 VITAMIN B-12: CPT

## 2025-02-12 PROCEDURE — 85025 COMPLETE CBC W/AUTO DIFF WBC: CPT

## 2025-02-12 PROCEDURE — 82570 ASSAY OF URINE CREATININE: CPT

## 2025-02-12 PROCEDURE — 36415 COLL VENOUS BLD VENIPUNCTURE: CPT

## 2025-02-12 PROCEDURE — G0439 PPPS, SUBSEQ VISIT: HCPCS | Performed by: FAMILY MEDICINE

## 2025-02-12 PROCEDURE — 84443 ASSAY THYROID STIM HORMONE: CPT

## 2025-02-12 PROCEDURE — 82306 VITAMIN D 25 HYDROXY: CPT

## 2025-02-12 PROCEDURE — 82043 UR ALBUMIN QUANTITATIVE: CPT

## 2025-02-12 RX ORDER — FENOFIBRATE 200 MG/1
200 CAPSULE ORAL
Qty: 90 CAPSULE | Refills: 4 | Status: SHIPPED | OUTPATIENT
Start: 2025-02-12

## 2025-02-12 NOTE — PROGRESS NOTES
Subjective:   Angy Partida is a 70 year old female who presents for a Medicare Subsequent Annual Wellness visit (Pt already had Initial Annual Wellness) and scheduled follow up of multiple significant but stable problems.   Pt reports BP at home is good. Glucose has been controlled but meter broke - using mahnaz. Taking meloxicam once in a while but mostly tylenol. Pain usually in calves walks barefoot at home.     History/Other:   Fall Risk Assessment:   She has been screened for Falls and is High Risk. Fall Prevention information provided to patient in After Visit Summary.    Do you feel unsteady when standing or walking?: (Patient-Rptd) Yes  Do you worry about falling?: (Patient-Rptd) Yes  Have you fallen in the past year?: (Patient-Rptd) No     Cognitive Assessment:   She had a completely normal cognitive assessment - see flowsheet entries     Functional Ability/Status:   Angy Partida has some abnormal functions as listed below:  She has Driving difficulties based on screening of functional status. She has Walking problems based on screening of functional status.       Depression Screening (PHQ):  PHQ-2 SCORE: 0  , done 2/5/2025          Advanced Directives:   She does NOT have a Living Will. [Do you have a living will?: (Patient-Rptd) No]  She does NOT have a Power of  for Health Care. [Do you have a healthcare power of ?: (Patient-Rptd) No]  Discussed Advance Care Planning with patient (and family/surrogate if present). Standard forms made available to patient in After Visit Summary.      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    Current Medications:  Outpatient Medications Marked as Taking for the 2/12/25 encounter (Office Visit) with Omaira Shah MD   Medication Sig    fluticasone propionate 50 MCG/ACT Nasal Suspension SPRAY 2 SPRAYS INTO EACH NOSTRIL EVERY DAY    ALPRAZolam 0.25 MG Oral Tab TAKE 1 TABLET BY MOUTH TWICE A DAY AS NEEDED    Insulin Aspart FlexPen 100 UNIT/ML Subcutaneous Solution Pen-injector Inject 26 Units into the skin in the morning, at noon, and at bedtime.    insulin degludec 100 units/mL Subcutaneous Solution Pen-injector Inject 40 Units into the skin nightly.    Insulin Pen Needle (BD PEN NEEDLE MICRO U/F) 32G X 6 MM Does not apply Misc USE AS DIRECTED 4 TIMES A DAY    Meloxicam 15 MG Oral Tab Take 1 tablet (15 mg total) by mouth daily.    insulin aspart (NOVOLOG FLEXPEN) 100 Units/mL Subcutaneous Solution Pen-injector Inject 26 Units into the skin 3 (three) times daily before meals.    fenofibrate micronized 200 MG Oral Cap Take 1 capsule (200 mg total) by mouth before breakfast.    pantoprazole 40 MG Oral Tab EC Take 1 tablet (40 mg total) by mouth daily.    amLODIPine 5 MG Oral Tab Take 1 tablet (5 mg total) by mouth daily.    dicyclomine 10 MG Oral Cap Take 1 capsule (10 mg total) by mouth 4 (four) times daily before meals and nightly.    lisinopril 30 MG Oral Tab Take 1 tablet (30 mg total) by mouth daily.    insulin degludec (TRESIBA FLEXTOUCH) 200 UNIT/ML Subcutaneous Solution Pen-injector Inject 68 Units into the skin nightly.    Continuous Blood Gluc Sensor (FREESTYLE AROLDO 2 SENSOR) Does not apply Misc 1 each every 14 (fourteen) days.    Insulin Syringe-Needle U-100 27G X 1/2\" 0.5 ML Does not apply Misc Use with insulin as directed    Accu-Chek Softclix Lancets Does not apply Misc Check blood glucose 3 times daily    Lancets Does not apply Misc Accu check softclix  lancets - check glucose up to 3 times per day. Dx E11.9    Glucose Blood (ACCU-CHEK ELAINE PLUS) In Vitro Strip Use 1 strip as directed three times daily    ondansetron (ZOFRAN) 4 mg tablet Take 1 tablet (4 mg total) by mouth every 8 (eight) hours as needed for Nausea.    Insulin Pen Needle (PEN NEEDLES) 32G X 4 MM Does not apply Misc 1 each every 7 days.    atorvastatin 40 MG Oral Tab     DICLOFENAC SODIUM 1 % Transdermal Gel APPLY 2 GRAMS TO AFFECTED AREA 4 TIMES A DAY    Lancets Does not apply Misc Test blood sugar 3 times daily.    VITAMIN D3 SUPER STRENGTH 2000 UNITS Oral Tab TAKE ONE TABLET BY MOUTH ONE TIME DAILY     Vitamin B-12 (VITAMIN B12) 1000 MCG Oral Tab Take 1 tablet by mouth once daily.    Ferrous Sulfate 325 (65 Fe) MG Oral Tab Take 1 tablet (325 mg total) by mouth daily with breakfast.     Current Facility-Administered Medications for the 2/12/25 encounter (Office Visit) with Omaria Shah MD   Medication    hyaluronic acid (SYNVISC-ONE) injection       Medical History:  She  has a past medical history of Allergic rhinitis (5/1/2013), Anemia, Angle-closure glaucoma (2012), Anxiety, Calculus of kidney, Diabetes (HCC) (1/1/2001), Diverticulosis of large intestine (2014), Esophageal reflux, Essential hypertension (1/1/2001), Eye exam, routine (03/14/2016), Hemorrhoids (Internal), High blood pressure, Hyperlipidemia (01/01/2001), IBS (irritable bowel syndrome), Migraines, Obesity, Osteoarthritis, PONV (postoperative nausea and vomiting), Rheumatoid arthritis (HCC), and Type 1 diabetes mellitus ([facility]) (2001).  Surgical History:  She  has a past surgical history that includes colonoscopy (2007); glaucoma surgery (Bilateral, 2012); Yag Iridectomy - OU - Both Eyes; removal of ovary/tube(s) (Bilateral, 11/04/2017); Breast biopsy (Left, 2004); vaginal hysterectomy (1984); laparoscopic cholecystectomy (1993); knee scope,med/lat menisectomy (Left, 2004); colonoscopy (N/A, 06/26/2018); egd (06/26/2018); elbow  fracture surgery (Right, ); ravi localization wire 1 site left (cpt=19281) (); hysterectomy; cholecystectomy (); d & c;  (1979); and other surgical history (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).   Family History:  Her family history includes Breast Cancer in her mother; Cancer in her father, mother, and paternal grandmother; Cataracts in her mother; Diabetes in her father, sister, sister, sister, and sister; Glaucoma in her father; Heart Disorder in her mother; Hypertension in her father and sister; Lipids in her sister, sister, sister, sister, and sister; Prostate Cancer in her father.  Social History:  She  reports that she has never smoked. She has never used smokeless tobacco. She reports that she does not drink alcohol and does not use drugs.    Tobacco:  She has never smoked tobacco.    CAGE Alcohol Screen:   CAGE screening score of 0 on 2025, showing low risk of alcohol abuse.      Patient Care Team:  Omaira Shah MD as PCP - General (Family Practice)    Review of Systems     Negative except pain in legs.     Objective:   Physical Exam  Constitutional:       Appearance: She is well-developed.   HENT:      Right Ear: Tympanic membrane normal.      Left Ear: Tympanic membrane normal.   Eyes:      Conjunctiva/sclera: Conjunctivae normal.   Cardiovascular:      Rate and Rhythm: Normal rate and regular rhythm.      Heart sounds: Normal heart sounds.   Pulmonary:      Effort: Pulmonary effort is normal.      Breath sounds: Normal breath sounds.   Abdominal:      General: Bowel sounds are normal.      Palpations: Abdomen is soft.   Neurological:      Mental Status: She is alert and oriented to person, place, and time.   Psychiatric:         Behavior: Behavior normal.     Bilateral barefoot skin diabetic exam is normal, visualized feet and the appearance is normal.  Bilateral monofilament/sensation of both feet is normal.  Pulsation pedal pulse exam of both lower legs/feet is normal as well.          /75   Pulse 70   Ht 5' 1\" (1.549 m)   Wt 215 lb (97.5 kg)   BMI 40.62 kg/m²  Estimated body mass index is 40.62 kg/m² as calculated from the following:    Height as of this encounter: 5' 1\" (1.549 m).    Weight as of this encounter: 215 lb (97.5 kg).    Medicare Hearing Assessment:   Hearing Screening    Time taken: 2/12/2025  9:05 AM  Screening Method: Questionnaire  I have a problem hearing over the telephone: No I have trouble following the conversations when two or more people are talking at the same time: No   I have trouble understanding things on the TV: No I have to strain to understand conversations: No   I have to worry about missing the telephone ring or doorbell: No I have trouble hearing conversations in a noisy background such as a crowded room or restaurant: Sometimes   I get confused about where sounds come from: No I misunderstand some words in a sentence and need to ask people to repeat themselves: No   I especially have trouble understanding the speech of women and children: No I have trouble understanding the speaker in a large room such as at a meeting or place of Christianity: No   Many people I talk to seem to mumble (or don't speak clearly): No People get annoyed because I misunderstand what they say: No   I misunderstand what others are saying and make inappropriate responses: No I avoid social activities because I cannot hear well and fear I will reply improperly: No   Family members and friends have told me they think I may have hearing loss: No             Visual Acuity:   Right Eye Visual Acuity: Uncorrected Right Eye Chart Acuity: 20/70   Left Eye Visual Acuity: Uncorrected Left Eye Chart Acuity: 20/70   Both Eyes Visual Acuity: Uncorrected Both Eyes Chart Acuity: 20/70            Assessment & Plan:   Angy Partida is a 70 year old female who presents for a Medicare Assessment.     1. 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 Morbid (severe) obesity due to excess calories (HCC)  Contributing to diabetes and HTN     3. Type 2 diabetes mellitus without complication, with long-term current use of insulin (HCC)  Stable on meds     4. Essential hypertension, benign  BP stable on meds     5. Mixed hyperlipidemia  Stable on meds     6. Nonrheumatic aortic valve stenosis  Seeing cards. Has been stable     7. Dry eye syndrome of bilateral lacrimal glands  Stable     8. Screening mammogram for breast cancer    - [facility] SHERRI 2D+3D SCREENING BILAT (CPT=77067/04292); Future    9. Post-menopausal    - XR DEXA BONE DENSITOMETRY (CPT=77080); Future  Encouraged pt to wear compression stockings right away in mornings and wear better arch supportive shoes - even at home.   The patient indicates understanding of these issues and agrees to the plan.  Reinforced healthy diet, lifestyle, and exercise.      No follow-ups on file.     Omaira Shah MD, 2/12/2025     Supplementary Documentation:   General Health:  In the past six months, have you lost more than 10 pounds without trying?: (Patient-Rptd) 2 - No  Has your appetite been poor?: (Patient-Rptd) No  Type of Diet: (Patient-Rptd) Balanced;Diabetic;Low Salt;Low Carb  How does the patient maintain a good energy level?: (Patient-Rptd) Appropriate Exercise  How would you describe your daily physical activity?: (Patient-Rptd) Light  How would you describe your current health state?: (Patient-Rptd) Fair  How do you maintain positive mental well-being?: (Patient-Rptd) Social Interaction;Puzzles;Visiting Family  On a scale of 0 to 10, with 0 being no pain and 10 being severe pain, what is your pain level?: (Patient-Rptd) 6 - (Moderate)  In the past six months, have you experienced urine leakage?: (Patient-Rptd) 1-Yes  At any time do you feel concerned for the safety/well-being of yourself and/or your children, in your home or elsewhere?: (Patient-Rptd) No  Have  you had any immunizations at another office such as Influenza, Hepatitis B, Tetanus, or Pneumococcal?: (Patient-Rptd) Yes    Health Maintenance   Topic Date Due    Annual Depression Screening  01/01/2025    Diabetes Care: Foot Exam (Annual)  01/01/2025    Diabetes Care: Microalb/Creat Ratio (Annual)  01/01/2025    Diabetes Care: GFR  02/12/2025    Annual Physical  02/12/2025    COVID-19 Vaccine (8 - 2024-25 season) 05/04/2025    Diabetes Care A1C  05/20/2025    Diabetes Care Dilated Eye Exam  06/03/2025    Mammogram  11/18/2025    Colorectal Cancer Screening  06/26/2028    Influenza Vaccine  Completed    DEXA Scan  Completed    Fall Risk Screening (Annual)  Completed    Pneumococcal Vaccine: 50+ Years  Completed    Zoster Vaccines  Completed    Meningococcal B Vaccine  Aged Out

## 2025-02-13 ENCOUNTER — PATIENT MESSAGE (OUTPATIENT)
Dept: FAMILY MEDICINE CLINIC | Facility: CLINIC | Age: 70
End: 2025-02-13

## 2025-02-13 RX ORDER — LISINOPRIL 30 MG/1
30 TABLET ORAL DAILY
Qty: 90 TABLET | Refills: 3 | Status: SHIPPED | OUTPATIENT
Start: 2025-02-13

## 2025-02-13 NOTE — TELEPHONE ENCOUNTER
Refill passed per [facility] protocol.     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 Visit    6 months ago Type 2 diabetes mellitus without complication, with long-term current use of insulin ([facility])    [facility]    Office Visit    7 months ago Uncontrolled type 2 diabetes mellitus with hyperglycemia ([facility])    [facility] Lulu Schofield MD    Office Visit    9 months ago Epigastric pain    [facility]          Future Appointments         Provider Department Appt Notes    In 1 month Lulu Schofield MD [facility] Return in about 4 months (around 3/20/2025).    In 6 months Omaira Shah MD [facility] follow up                    Passed - EGFRCR or GFRNAA > 50     GFR Evaluation  EGFRCR: 58 , resulted on 2/12/2025          Passed - Medication is active on med list              @[facility]@      @[facility]VPRNTGRP@

## 2025-02-28 ENCOUNTER — TELEPHONE (OUTPATIENT)
Dept: ENDOCRINOLOGY CLINIC | Facility: CLINIC | Age: 70
End: 2025-02-28

## 2025-02-28 NOTE — TELEPHONE ENCOUNTER
insulin degludec (TRESIBA FLEXTOUCH) 200 UNIT/ML Subcutaneous Solution Pen-injector Inject 68 Units into the skin nightly. 60 mL 1     Not on formulary-  Pl send alternate

## 2025-03-04 RX ORDER — INSULIN DEGLUDEC 200 U/ML
68 INJECTION, SOLUTION SUBCUTANEOUS NIGHTLY
Qty: 30 ML | Refills: 0 | Status: SHIPPED | OUTPATIENT
Start: 2025-03-04

## 2025-03-04 NOTE — TELEPHONE ENCOUNTER
Called pharmacy.   Tresiba is covered with out of pocket cost of $105 for 3 months.   No alternatives given since medication is covered.   Pended 3 month supply below.

## 2025-03-26 ENCOUNTER — OFFICE VISIT (OUTPATIENT)
Dept: ENDOCRINOLOGY CLINIC | Facility: CLINIC | Age: 70
End: 2025-03-26
Payer: MEDICARE

## 2025-03-26 VITALS
DIASTOLIC BLOOD PRESSURE: 79 MMHG | WEIGHT: 214 LBS | SYSTOLIC BLOOD PRESSURE: 132 MMHG | HEART RATE: 69 BPM | BODY MASS INDEX: 40 KG/M2

## 2025-03-26 DIAGNOSIS — E11.65 UNCONTROLLED TYPE 2 DIABETES MELLITUS WITH HYPERGLYCEMIA (HCC): Primary | ICD-10-CM

## 2025-03-26 LAB
GLUCOSE BLOOD: 187
HEMOGLOBIN A1C: 7.3 % (ref 4.3–5.6)
TEST STRIP LOT #: NORMAL NUMERIC

## 2025-03-26 PROCEDURE — 95251 CONT GLUC MNTR ANALYSIS I&R: CPT | Performed by: INTERNAL MEDICINE

## 2025-03-26 PROCEDURE — 82947 ASSAY GLUCOSE BLOOD QUANT: CPT | Performed by: INTERNAL MEDICINE

## 2025-03-26 PROCEDURE — 99214 OFFICE O/P EST MOD 30 MIN: CPT | Performed by: INTERNAL MEDICINE

## 2025-03-26 PROCEDURE — 83036 HEMOGLOBIN GLYCOSYLATED A1C: CPT | Performed by: INTERNAL MEDICINE

## 2025-03-26 NOTE — PROGRESS NOTES
Name: Angy Partida  Date: 3/26/2025    Referring Physician: No ref. provider found    HISTORY OF PRESENT ILLNESS   nAgy Partida is a 70 year old female who presents for diabetes mellitus, diagnosed in 2001.     Prior HbA, C or glycohemoglobin were 8.6% 1/2021; 7.4% 5/2021; 7.5% 9/2021; 7.9% 1/2022; 7.1% 5/2022; 7.2% 9/2022; 7.2% 2/2023; 7.4% 5/2023; 8.0% 10/2023; 8.1% 2/2024; 7.2% 7/2024; 7.4% 11/2024; 7.3% POC Today     Dietary compliance: Good -->improved diet over the past few months   Exercise: Yes -->walking puppy   Polyuria/polydipsia: No  Blurred vision: No    Episodes of hypoglycemia: Yes - occ nocturnal hypoglycemia   Blood Glucose:  Checking 4 times per day  Reviewed Eladio Download -->time in range 50%     Medications for DM   Fiasp 28 units subcutaneous TID with meals  Tresiba 68 units subcutaneous at bedtime     Metformin d/c'd due to GI upset     REVIEW OF SYSTEMS  Eyes: Diabetic retinopathy present: No            Most recent visit to eye doctor in last 12 months: No - visit scheduled for next week     CV: Cardiovascular disease present: No         Hypertension present: Yes         Hyperlipidemia present: Yes         Peripheral Vascular Disease present: No    : Nephropathy present: No    Neuro: Neuropathy present: No    Skin: Infection or ulceration: No    Osteoporosis: No    Thyroid disease: No      Medications:     Current Outpatient Medications:     insulin degludec (TRESIBA FLEXTOUCH) 200 UNIT/ML Subcutaneous Solution Pen-injector, Inject 68 Units into the skin nightly., Disp: 30 mL, Rfl: 0    lisinopril 30 MG Oral Tab, Take 1 tablet (30 mg total) by mouth daily., Disp: 90 tablet, Rfl: 3    fenofibrate micronized 200 MG Oral Cap, Take 1 capsule (200 mg total) by mouth before breakfast., Disp: 90 capsule, Rfl: 4    fluticasone propionate 50 MCG/ACT Nasal Suspension, SPRAY 2 SPRAYS INTO EACH NOSTRIL EVERY DAY, Disp: 48 mL, Rfl: 1    ALPRAZolam 0.25 MG Oral Tab, TAKE 1 TABLET BY MOUTH TWICE A  DAY AS NEEDED, Disp: 30 tablet, Rfl: 0    Insulin Aspart FlexPen 100 UNIT/ML Subcutaneous Solution Pen-injector, Inject 26 Units into the skin in the morning, at noon, and at bedtime., Disp: 60 mL, Rfl: 1    Insulin Pen Needle (BD PEN NEEDLE MICRO U/F) 32G X 6 MM Does not apply Misc, USE AS DIRECTED 4 TIMES A DAY, Disp: 400 each, Rfl: 1    Meloxicam 15 MG Oral Tab, Take 1 tablet (15 mg total) by mouth daily., Disp: 90 tablet, Rfl: 1    pantoprazole 40 MG Oral Tab EC, Take 1 tablet (40 mg total) by mouth daily., Disp: 90 tablet, Rfl: 3    amLODIPine 5 MG Oral Tab, Take 1 tablet (5 mg total) by mouth daily., Disp: 90 tablet, Rfl: 3    dicyclomine 10 MG Oral Cap, Take 1 capsule (10 mg total) by mouth 4 (four) times daily before meals and nightly., Disp: 120 capsule, Rfl: 2    Continuous Blood Gluc Sensor (FREESTYLE AROLDO 2 SENSOR) Does not apply Misc, 1 each every 14 (fourteen) days., Disp: 6 each, Rfl: 0    Insulin Syringe-Needle U-100 27G X 1/2\" 0.5 ML Does not apply Misc, Use with insulin as directed, Disp: 100 each, Rfl: 0    Accu-Chek Softclix Lancets Does not apply Misc, Check blood glucose 3 times daily, Disp: 300 each, Rfl: 3    Lancets Does not apply Misc, Accu check softclix lancets - check glucose up to 3 times per day. Dx E11.9, Disp: 200 each, Rfl: 4    Glucose Blood (ACCU-CHEK ELAINE PLUS) In Vitro Strip, Use 1 strip as directed three times daily, Disp: 300 strip, Rfl: 3    ondansetron (ZOFRAN) 4 mg tablet, Take 1 tablet (4 mg total) by mouth every 8 (eight) hours as needed for Nausea., Disp: 20 tablet, Rfl: 1    Insulin Pen Needle (PEN NEEDLES) 32G X 4 MM Does not apply Misc, 1 each every 7 days., Disp: 30 each, Rfl: 0    atorvastatin 40 MG Oral Tab, , Disp: , Rfl:     Lancets Does not apply Misc, Test blood sugar 3 times daily., Disp: 300 each, Rfl: 3    VITAMIN D3 SUPER STRENGTH 2000 UNITS Oral Tab, TAKE ONE TABLET BY MOUTH ONE TIME DAILY , Disp: 30 tablet, Rfl: 11    Vitamin B-12 (VITAMIN B12) 1000 MCG  Oral Tab, Take 1 tablet by mouth once daily., Disp: 30 tablet, Rfl: 2    Ferrous Sulfate 325 (65 Fe) MG Oral Tab, Take 1 tablet (325 mg total) by mouth daily with breakfast., Disp: , Rfl:     insulin aspart (NOVOLOG FLEXPEN) 100 Units/mL Subcutaneous Solution Pen-injector, Inject 26 Units into the skin 3 (three) times daily before meals., Disp: 45 mL, Rfl: 1    Insulin Aspart, w/Niacinamide, (FIASP FLEXTOUCH) 100 UNIT/ML Subcutaneous Solution Pen-injector, Inject 26 Units into the skin in the morning, at noon, and at bedtime. (Patient not taking: Reported on 3/26/2025), Disp: 60 mL, Rfl: 1    DICLOFENAC SODIUM 1 % Transdermal Gel, APPLY 2 GRAMS TO AFFECTED AREA 4 TIMES A DAY, Disp: 100 g, Rfl: 0     Allergies:   Allergies   Allergen Reactions    Codeine NAUSEA AND VOMITING     Abdominal pain    Hydrocodone NAUSEA ONLY    Morphine NAUSEA AND VOMITING     Abdominal pain         Social History:   Social History     Socioeconomic History    Marital status:     Number of children: 1   Occupational History    Occupation: homemaker   Tobacco Use    Smoking status: Never    Smokeless tobacco: Never   Vaping Use    Vaping status: Never Used   Substance and Sexual Activity    Alcohol use: No    Drug use: No   Other Topics Concern    Caffeine Concern No    Right Handed Yes    Currently spends a great deal of time in the sun Yes    History of tanning No    Hx of Spending Great Deal of Time in Sun Yes    Bad sunburns in the past Yes    Tanning Salons in the Past No    Hx of Radiation Treatments No       Medical History:   Past Medical History:    Allergic rhinitis    Anemia    Angle-closure glaucoma    Anxiety    Calculus of kidney    Diabetes (HCC)    Diverticulosis of large intestine    Esophageal reflux    Essential hypertension    Eye exam, routine    [facility]     Hemorrhoids    High blood pressure    Hyperlipidemia    IBS (irritable bowel syndrome)    Migraines    Obesity    Osteoarthritis    PONV  (postoperative nausea and vomiting)    Rheumatoid arthritis (HCC)    Type 1 diabetes mellitus (HCC)       Surgical history:   Past Surgical History:   Procedure Laterality Date    Breast biopsy Left     fibroadenoma    Cholecystectomy      Colonoscopy      Colonoscopy N/A 2018    Procedure: COLONOSCOPY;  Surgeon: Richard Healy MD;  Location: [facility] ENDOSCOPY    D & c      Egd  2018    Elbow fracture surgery Right     Glaucoma surgery Bilateral     Hysterectomy      Knee scope,med/lat menisectomy Left     Laparoscopic cholecystectomy      Danial localization wire 1 site left (cpt=19281)        1979    Other surgical history  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    Broken elbow bone chips & knee surgery left knee    Removal of ovary/tube(s) Bilateral 2017    Laparoscopic, Ghia/Potkul    Vaginal hysterectomy      Yag iridectomy - ou - both eyes      OS 5/10/12, OD 12         PHYSICAL EXAM  /79   Pulse 69   Wt 214 lb (97.1 kg)   BMI 40.43 kg/m²     General Appearance:  alert, well developed, in no acute distress  Eyes:  normal conjunctivae, sclera., normal sclera and normal pupils  Ears/Nose/Mouth/Throat/Neck:  no palpable thyroid nodules   Back: no kyphosis or back tenderness  Musculoskeletal:  normal muscle strength and tone  Skin:  normal moisture and skin texture  Hair & Nails:  normal scalp hair     Hematologic:  no excessive bruising  Neuro:  sensory grossly intact and motor grossly intact  Psychiatric:  oriented to time, self, and place  Nutritional:  no abnormal weight gain or loss    ASSESSMENT/PLAN:      1. Diabetes Mellitus Type 2, controlled  -controlled; HgA1c 7.3% -->improved   -Discussed importance of glycemic control to prevent complications of diabetes  -Discussed complications of diabetes include retinopathy, neuropathy, nephropathy and cardiovascular disease  -Discussed importance of SBGM  -Discussed importance of low CHO diet,  recommend 45gm per meal or 135gm per day  -Continue current dose of Tresiba  -Discussed  Tresiba and Novolog pens -->one in bedroom and one in kitchen to prevent confusion   -Discussed importance of not skipping dose entirely   -Continue Novolog 26 units subcutaneous with meals    -Discussed taking Novolog 15 minutes before meals to prevent postprandial hyperglycemia   -Continue CF 1:40>140   -Continue Eladio CGM   -Normotensive   -Normal lipids  -Foot exam with Dr. Shah    2. Hypercalcemia  - New diagnosis, now improved   - Not PTH mediated  - Continue Vitamin D 2000 units daily  - labs improved, stable     Continuous Glucose Monitoring Interpretation    Angy Partida has undergone continuous glucose monitoring with the personal Freestyle Eladio.   Her blood glucose tracings were evaluated for two weeks prior to office visit.  Overall her tracings demonstrated well controlled blood glucose levels with some increased post prandial hyperglycemia.  She did not experience any severe hypoglycemia during the week of evaluation.  As a result of her testing her medication was adjusted as noted above.       RTC 4 months     3/26/2025  Lulu Schofield MD

## 2025-03-26 NOTE — PROGRESS NOTES
Name: Angy Partida  YOB: 1955  Report Period: 02/27/2025 - 03/26/2025 (28 days)  Generated: 03/26/2025  Time CGM Active: 91%      Glucose Statistics and Targets  Average Glucose: 176 mg/dL  Glucose Management Indicator (GMI): 7.5%  Glucose Variability (%CV): 27.7%  Target Range: 70 - 180 mg/dL      Time in Ranges  Very High: >250 mg/dL --- 9%  High: 181 - 250 mg/dL --- 34%  Target Range: 70 - 180 mg/dL --- 57%  Low: 54 - 69 mg/dL --- 0%  Very Low: <54 mg/dL --- 0%

## 2025-03-28 RX ORDER — MELOXICAM 15 MG/1
15 TABLET ORAL DAILY
Qty: 90 TABLET | Refills: 1 | Status: SHIPPED | OUTPATIENT
Start: 2025-03-28

## 2025-03-28 NOTE — TELEPHONE ENCOUNTER
Refill passed per [facility] protocol.    Requested Prescriptions   Pending Prescriptions Disp Refills    MELOXICAM 15 MG Oral Tab [Pharmacy Med Name: MELOXICAM 15 MG TABLET] 90 tablet 1     Sig: Take 1 tablet (15 mg total) by mouth daily.       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 associated with type 2 diabetes mellitus ([facility])    [facility] [place] Omaira Shah MD    Office Visit    4 months ago Uncontrolled type 2 diabetes mellitus with hyperglycemia ([facility])    [facility] GeeLulu Albarran MD    Office Visit    7 months ago Type 2 diabetes mellitus without complication, with long-term current use of insulin ([facility]    [facility]    8 months ago Uncontrolled type 2 diabetes mellitus with hyperglycemia ([facility])    [facility] Lulu Schofield MD    Office Visit          Future Appointments         Provider Department Appt Notes    In 4 months Lulu Schofield MD [facility] Return in about 4 months (around 7/26/2025).    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 associated with type 2 diabetes mellitus ([facility])    [facility] Omaira Heath MD    Office Visit    4 months ago Uncontrolled type 2 diabetes mellitus with hyperglycemia ([facility])    [facility] Lulu Schofield MD    Office Visit    7 months ago Type 2 diabetes mellitus without complication, with long-term current use of insulin ([facility])    [facility] Omaira Heath MD    Office Visit    8 months ago Uncontrolled type 2 diabetes mellitus with hyperglycemia ([facility])    [facility] Lulu Schofield MD    Office Visit            Future Appointments         Provider Department Appt Notes    In 4 months Lulu Schofield MD [facility] Return in about 4 months (around 7/26/2025).    In 4 months Omaira Shah MD [facility] follow up

## 2025-04-21 ENCOUNTER — PATIENT MESSAGE (OUTPATIENT)
Dept: ENDOCRINOLOGY CLINIC | Facility: CLINIC | Age: 70
End: 2025-04-21

## 2025-04-30 NOTE — PROGRESS NOTES
Subjective:   Angy Partida is a 70 year old female who presents for Shoulder Pain and Neck Pain (Patient is coming in for shoulder and neck pain patient had this pain for one month)   Patient is a pleasant 70-year-old female past medical history significant for DM2, hypertension, obesity, hyperlipidemia, aortic valve stenosis, and chronic dry eye.  Patient presents to office today new to this provider for evaluation of shoulder and neck pain.  Patient states she is having some pain in her neck and into her mid back. She has had this for like the last month and half. No Numbness. No tingling. No issues with bowel or bladder. She is under a lot of stress. Daily routine has been daunting. She has been caring for her sister and all her sisters finances as well. She has headahces with this as well. She has tension in upper back that radiates into head and feels like squeezing.         Past Medical History[1]   Past Surgical History[2]     History/Other:    Chief Complaint Reviewed and Verified  Nursing Notes Reviewed and   Verified  Tobacco Reviewed  Allergies Reviewed  Medications Reviewed    Problem List Reviewed  Medical History Reviewed  Surgical History   Reviewed  Family History Reviewed  Social History Reviewed         Tobacco:  She has never smoked tobacco.    Current Medications[3]      Review of Systems:  Review of Systems   Constitutional:  Negative for chills and fever.   Respiratory:  Negative for shortness of breath.    Cardiovascular:  Negative for chest pain.   Musculoskeletal:  Positive for myalgias, neck pain and neck stiffness.   Neurological:  Positive for headaches.         Objective:   /82   Pulse 89   Temp 97 °F (36.1 °C)   Ht 5' 1\" (1.549 m)   Wt 214 lb (97.1 kg)   BMI 40.43 kg/m²  Estimated body mass index is 40.43 kg/m² as calculated from the following:    Height as of this encounter: 5' 1\" (1.549 m).    Weight as of this encounter: 214 lb (97.1 kg).  Physical  Exam  Vitals and nursing note reviewed.   Constitutional:       Appearance: Normal appearance.   Cardiovascular:      Rate and Rhythm: Normal rate and regular rhythm.      Pulses: Normal pulses.      Heart sounds: Normal heart sounds.   Pulmonary:      Effort: Pulmonary effort is normal.      Breath sounds: Normal breath sounds.   Musculoskeletal:         General: Tenderness present. No swelling, deformity or signs of injury.        Arms:       Cervical back: Normal range of motion. Tenderness present.   Skin:     Capillary Refill: Capillary refill takes less than 2 seconds.   Neurological:      Mental Status: She is alert and oriented to person, place, and time.           Assessment & Plan:   1. Tension headache (Primary)  -     Cyclobenzaprine HCl; Take 0.5 tablets (5 mg total) by mouth nightly as needed for Muscle spasms.  Dispense: 15 tablet; Refill: 0  2. Neck pain  -     Cyclobenzaprine HCl; Take 0.5 tablets (5 mg total) by mouth nightly as needed for Muscle spasms.  Dispense: 15 tablet; Refill: 0  3. Muscle tension pain  -     Cyclobenzaprine HCl; Take 0.5 tablets (5 mg total) by mouth nightly as needed for Muscle spasms.  Dispense: 15 tablet; Refill: 0    1. Tension headache  Patient's description of Location, characteristics, appearance, duration, and associated symptoms are suggestive of a tension Headache.  Patient will start treatment with cyclobenzaprine.  Patient educated on HA Diary to identify possible triggers  Educated on over the counter magnesium glycinate for possible relief.   Educated on stress relief and self care with massage, and/or warm or cool compresses.  If symptoms persist can refer to neurology as needed.     - cyclobenzaprine 10 MG Oral Tab; Take 0.5 tablets (5 mg total) by mouth nightly as needed for Muscle spasms.  Dispense: 15 tablet; Refill: 0    2. Neck pain  Related to stress and tension  Warm moist heat education  Educated on salonpas and biofreeze and icy hot  Educated on rom  exercises  Follow up as needed  - cyclobenzaprine 10 MG Oral Tab; Take 0.5 tablets (5 mg total) by mouth nightly as needed for Muscle spasms.  Dispense: 15 tablet; Refill: 0    3. Muscle tension pain  Related to stress and tension  Warm moist heat education  Educated on salonpas and biofreeze and icy hot  Educated on rom exercises  Follow up as needed  - cyclobenzaprine 10 MG Oral Tab; Take 0.5 tablets (5 mg total) by mouth nightly as needed for Muscle spasms.  Dispense: 15 tablet; Refill: 0    Patient aware of plan of care. All questions answered to satisfaction of the patient. Patient instructed to call office or reach out via [employer] if any issues arise. For urgent issues and/or reviewed red flags please proceed to the urgent care or ER.  Also, inform the nurse practitioner with any new symptoms or medication side effects.        Return if symptoms worsen or fail to improve.    Alberto Tejada, RAIN, 4/30/2025, 11:27 AM          [1]   Past Medical History:   Allergic rhinitis    Anemia    Angle-closure glaucoma    Anxiety    Calculus of kidney    Diabetes (HCC)    Diverticulosis of large intestine    Esophageal reflux    Essential hypertension    Eye exam, routine    [facility]     Hemorrhoids    High blood pressure    Hyperlipidemia    IBS (irritable bowel syndrome)    Migraines    Obesity    Osteoarthritis    PONV (postoperative nausea and vomiting)    Rheumatoid arthritis (HCC)    Type 1 diabetes mellitus (HCC)   [2]   Past Surgical History:  Procedure Laterality Date    Breast biopsy Left 2004    fibroadenoma    Cholecystectomy  1993    Colonoscopy  2007    Colonoscopy N/A 06/26/2018    Procedure: COLONOSCOPY;  Surgeon: Richard Healy MD;  Location: [facility] ENDOSCOPY    D & c      Egd  06/26/2018    Elbow fracture surgery Right 1998    Glaucoma surgery Bilateral 2012    Hysterectomy      Knee scope,med/lat menisectomy Left 2004    Laparoscopic cholecystectomy  1993    [facility] localization wire 1 site  left (cpt=19281)        1979    Other surgical history  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    Broken elbow bone chips & knee surgery left knee    Removal of ovary/tube(s) Bilateral 2017    Laparoscopic, Ghia/Potkul    Vaginal hysterectomy      Yag iridectomy - ou - both eyes      OS 5/10/12, OD 12   [3]   Current Outpatient Medications   Medication Sig Dispense Refill    magnesium oxide 400 MG Oral Tab Take 1 tablet (400 mg total) by mouth daily.      cyclobenzaprine 10 MG Oral Tab Take 0.5 tablets (5 mg total) by mouth nightly as needed for Muscle spasms. 15 tablet 0    Meloxicam 15 MG Oral Tab Take 1 tablet (15 mg total) by mouth daily. 90 tablet 1    insulin degludec (TRESIBA FLEXTOUCH) 200 UNIT/ML Subcutaneous Solution Pen-injector Inject 68 Units into the skin nightly. 30 mL 0    lisinopril 30 MG Oral Tab Take 1 tablet (30 mg total) by mouth daily. 90 tablet 3    fenofibrate micronized 200 MG Oral Cap Take 1 capsule (200 mg total) by mouth before breakfast. 90 capsule 4    fluticasone propionate 50 MCG/ACT Nasal Suspension SPRAY 2 SPRAYS INTO EACH NOSTRIL EVERY DAY 48 mL 1    ALPRAZolam 0.25 MG Oral Tab TAKE 1 TABLET BY MOUTH TWICE A DAY AS NEEDED 30 tablet 0    Insulin Aspart FlexPen 100 UNIT/ML Subcutaneous Solution Pen-injector Inject 26 Units into the skin in the morning, at noon, and at bedtime. 60 mL 1    Insulin Pen Needle (BD PEN NEEDLE MICRO U/F) 32G X 6 MM Does not apply Misc USE AS DIRECTED 4 TIMES A  each 1    pantoprazole 40 MG Oral Tab EC Take 1 tablet (40 mg total) by mouth daily. 90 tablet 3    amLODIPine 5 MG Oral Tab Take 1 tablet (5 mg total) by mouth daily. 90 tablet 3    dicyclomine 10 MG Oral Cap Take 1 capsule (10 mg total) by mouth 4 (four) times daily before meals and nightly. 120 capsule 2    Continuous Blood Gluc Sensor (FREESTYLE AROLDO 2 SENSOR) Does not apply Misc 1 each every 14 (fourteen) days. 6 each 0    Insulin Syringe-Needle U-100 27G X 1/2\" 0.5 ML Does  not apply Misc Use with insulin as directed 100 each 0    Accu-Chek Softclix Lancets Does not apply Misc Check blood glucose 3 times daily 300 each 3    Lancets Does not apply Misc Accu check softclix lancets - check glucose up to 3 times per day. Dx E11.9 200 each 4    Glucose Blood (ACCU-CHEK ELAINE PLUS) In Vitro Strip Use 1 strip as directed three times daily 300 strip 3    ondansetron (ZOFRAN) 4 mg tablet Take 1 tablet (4 mg total) by mouth every 8 (eight) hours as needed for Nausea. 20 tablet 1    Insulin Pen Needle (PEN NEEDLES) 32G X 4 MM Does not apply Misc 1 each every 7 days. 30 each 0    atorvastatin 40 MG Oral Tab       Lancets Does not apply Misc Test blood sugar 3 times daily. 300 each 3    VITAMIN D3 SUPER STRENGTH 2000 UNITS Oral Tab TAKE ONE TABLET BY MOUTH ONE TIME DAILY  30 tablet 11    Vitamin B-12 (VITAMIN B12) 1000 MCG Oral Tab Take 1 tablet by mouth once daily. 30 tablet 2    Ferrous Sulfate 325 (65 Fe) MG Oral Tab Take 1 tablet (325 mg total) by mouth daily with breakfast.      insulin aspart (NOVOLOG FLEXPEN) 100 Units/mL Subcutaneous Solution Pen-injector Inject 26 Units into the skin 3 (three) times daily before meals. 45 mL 1    Insulin Aspart, w/Niacinamide, (FIASP FLEXTOUCH) 100 UNIT/ML Subcutaneous Solution Pen-injector Inject 26 Units into the skin in the morning, at noon, and at bedtime. (Patient not taking: Reported on 5/1/2025) 60 mL 1    DICLOFENAC SODIUM 1 % Transdermal Gel APPLY 2 GRAMS TO AFFECTED AREA 4 TIMES A  g 0

## 2025-05-01 ENCOUNTER — OFFICE VISIT (OUTPATIENT)
Dept: FAMILY MEDICINE CLINIC | Facility: CLINIC | Age: 70
End: 2025-05-01
Payer: MEDICARE

## 2025-05-01 VITALS
BODY MASS INDEX: 40.4 KG/M2 | HEIGHT: 61 IN | WEIGHT: 214 LBS | TEMPERATURE: 97 F | DIASTOLIC BLOOD PRESSURE: 82 MMHG | HEART RATE: 89 BPM | SYSTOLIC BLOOD PRESSURE: 137 MMHG

## 2025-05-01 DIAGNOSIS — M54.2 NECK PAIN: ICD-10-CM

## 2025-05-01 DIAGNOSIS — G44.209 TENSION HEADACHE: Primary | ICD-10-CM

## 2025-05-01 DIAGNOSIS — M79.10 MUSCLE TENSION PAIN: ICD-10-CM

## 2025-05-01 PROCEDURE — 99213 OFFICE O/P EST LOW 20 MIN: CPT

## 2025-05-01 RX ORDER — MAGNESIUM OXIDE 400 MG/1
1 TABLET ORAL DAILY
COMMUNITY
Start: 2025-04-18

## 2025-05-01 RX ORDER — CYCLOBENZAPRINE HCL 10 MG
5 TABLET ORAL NIGHTLY PRN
Qty: 15 TABLET | Refills: 0 | Status: SHIPPED | OUTPATIENT
Start: 2025-05-01

## 2025-05-09 RX ORDER — PANTOPRAZOLE SODIUM 40 MG/1
40 TABLET, DELAYED RELEASE ORAL DAILY
Qty: 90 TABLET | Refills: 3 | Status: SHIPPED | OUTPATIENT
Start: 2025-05-09

## 2025-05-09 RX ORDER — AMLODIPINE BESYLATE 5 MG/1
5 TABLET ORAL DAILY
Qty: 90 TABLET | Refills: 3 | Status: SHIPPED | OUTPATIENT
Start: 2025-05-09

## 2025-05-19 ENCOUNTER — HOSPITAL ENCOUNTER (OUTPATIENT)
Dept: GENERAL RADIOLOGY | Age: 70
Discharge: HOME | End: 2025-05-19
Attending: FAMILY MEDICINE
Payer: MEDICARE

## 2025-05-19 ENCOUNTER — NURSE TRIAGE (OUTPATIENT)
Dept: FAMILY MEDICINE CLINIC | Facility: CLINIC | Age: 70
End: 2025-05-19

## 2025-05-19 ENCOUNTER — OFFICE VISIT (OUTPATIENT)
Dept: FAMILY MEDICINE CLINIC | Facility: CLINIC | Age: 70
End: 2025-05-19
Payer: MEDICARE

## 2025-05-19 VITALS
DIASTOLIC BLOOD PRESSURE: 75 MMHG | BODY MASS INDEX: 40.78 KG/M2 | SYSTOLIC BLOOD PRESSURE: 146 MMHG | HEART RATE: 79 BPM | TEMPERATURE: 99 F | WEIGHT: 216 LBS | HEIGHT: 61 IN

## 2025-05-19 DIAGNOSIS — M79.89 SWELLING OF RIGHT MIDDLE FINGER: ICD-10-CM

## 2025-05-19 DIAGNOSIS — R20.0 NUMBNESS OF RIGHT HAND: Primary | ICD-10-CM

## 2025-05-19 PROCEDURE — 73140 X-RAY EXAM OF FINGER(S): CPT | Performed by: FAMILY MEDICINE

## 2025-05-19 PROCEDURE — 99214 OFFICE O/P EST MOD 30 MIN: CPT | Performed by: FAMILY MEDICINE

## 2025-05-19 NOTE — PROGRESS NOTES
Patient ID: Angy Partida is a 70 year old female.         The following individual(s) verbally consented to be recorded using ambient AI listening technology and understand that they can each withdraw their consent to this listening technology at any point by asking the clinician to turn off or pause the recording:    Patient name: Angy Partida  Additional names: Her daughter April is here with her .         HPI  Chief Complaint   Patient presents with    Numbness     Rt. Hand numbness , started last night, lost sensation for over one hour         Signed         Please reply to pool: EM RN TRIAGE  Action Requested: Summary for Provider      []  Critical Lab, Recommendations Needed  [] Need Additional Advice  [x]   FYI    []   Need Orders  [] Need Medications Sent to Pharmacy  []  Other      SUMMARY: Patient contacts clinic reporting numbness in her right had that began over the weekend.  Middle finger is swollen.  Denies arm symptoms.  Denies chest pain or neck pain.  Pins and needles.  Feels cold.  Acute visit booked today.      Reason for call: Numbness  Onset: Data Unavailable                                                       Reason for Disposition   Numbness (i.e., loss of sensation) of new-onset in hand or fingers  (Exception: Slight tingling; numbness present > 2 weeks.)    Protocols used: Hand and Wrist Pain-A-OH              Electronically signed by Elsy Garcia RN at 5/19/2025  8:13 AM         Nurse Triage on 5/19/2025            Detailed Report          Note shared with patient  History of Present Illness  Mrs. Angy Partida is a 70 year old female with osteoarthritis who presents with numbness and swelling in her right hand. She is accompanied by her daughter, April.    She experiences numbness in her right hand, specifically affecting the first through third fingers. This episode began last night, lasting about an hour and twenty minutes, and woke her from sleep. She describes the  sensation as feeling like 'a wooden thing' and notes that rubbing, pressing, or flexing the hand usually helps, but this time it did not resolve quickly. She has experienced similar episodes in the past, but they have not lasted as long as this recent one.    Prior to the numbness, she noticed significant swelling in her right middle finger over several days. She denies any specific injury to the finger but mentions engaging in a lot of gardening activities, including using a lawnmower. She believes her history of osteoarthritis may be contributing to the swelling.    No neck pain or symptoms suggestive of a heart attack, such as inability to smile or lift both arms.  She made sure her tongue was midline and that she could raise both arms.  She is able to smile.  She wanted to make sure she was not having a stroke.    Wt Readings from Last 6 Encounters:   05/19/25 216 lb (98 kg)   05/01/25 214 lb (97.1 kg)   03/26/25 214 lb (97.1 kg)   02/12/25 215 lb (97.5 kg)   11/20/24 212 lb (96.2 kg)   08/12/24 212 lb (96.2 kg)       BMI Readings from Last 6 Encounters:   05/19/25 40.81 kg/m²   05/01/25 40.43 kg/m²   03/26/25 40.43 kg/m²   02/12/25 40.62 kg/m²   11/20/24 40.06 kg/m²   08/12/24 40.06 kg/m²       BP Readings from Last 6 Encounters:   05/19/25 146/75   05/01/25 137/82   03/26/25 132/79   02/12/25 130/75   11/20/24 120/73   08/12/24 133/82       Results      Review of Systems  No exertional cardiac chest pain or shortness of breath unless stated in HPI which would take precedence.  See HPI for further review of systems.        Medical History:      Past Medical History:    Allergic rhinitis    Anemia    Angle-closure glaucoma    Anxiety    Calculus of kidney    Diabetes (HCC)    Diverticulosis of large intestine    Esophageal reflux    Essential hypertension    Eye exam, routine    [facility]     Hemorrhoids    High blood pressure    Hyperlipidemia    IBS (irritable bowel syndrome)    Migraines    Obesity     Osteoarthritis    PONV (postoperative nausea and vomiting)    Rheumatoid arthritis (HCC)    Type 1 diabetes mellitus (HCC)       Past Surgical History:   Procedure Laterality Date    Breast biopsy Left     fibroadenoma    Cholecystectomy      Colonoscopy      Colonoscopy N/A 2018    Procedure: COLONOSCOPY;  Surgeon: Richard Healy MD;  Location: [facility] ENDOSCOPY    D & c      Egd  2018    Elbow fracture surgery Right     Glaucoma surgery Bilateral     Hysterectomy      Knee scope,med/lat menisectomy Left     Laparoscopic cholecystectomy      Danial localization wire 1 site left (cpt=19281)        1979    Other surgical history  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    Broken elbow bone chips & knee surgery left knee    Removal of ovary/tube(s) Bilateral 2017    Laparoscopic, Ghia/Potkul    Vaginal hysterectomy      Yag iridectomy - ou - both eyes      OS 5/10/12, OD 12          Current Outpatient Medications   Medication Sig Dispense Refill    pantoprazole 40 MG Oral Tab EC Take 1 tablet (40 mg total) by mouth daily. 90 tablet 3    amLODIPine 5 MG Oral Tab Take 1 tablet (5 mg total) by mouth daily. 90 tablet 3    magnesium oxide 400 MG Oral Tab Take 1 tablet (400 mg total) by mouth daily.      cyclobenzaprine 10 MG Oral Tab Take 0.5 tablets (5 mg total) by mouth nightly as needed for Muscle spasms. 15 tablet 0    Meloxicam 15 MG Oral Tab Take 1 tablet (15 mg total) by mouth daily. 90 tablet 1    insulin degludec (TRESIBA FLEXTOUCH) 200 UNIT/ML Subcutaneous Solution Pen-injector Inject 68 Units into the skin nightly. 30 mL 0    lisinopril 30 MG Oral Tab Take 1 tablet (30 mg total) by mouth daily. 90 tablet 3    fenofibrate micronized 200 MG Oral Cap Take 1 capsule (200 mg total) by mouth before breakfast. 90 capsule 4    fluticasone propionate 50 MCG/ACT Nasal Suspension SPRAY 2 SPRAYS INTO EACH NOSTRIL EVERY DAY 48 mL 1    ALPRAZolam 0.25 MG Oral Tab TAKE 1  TABLET BY MOUTH TWICE A DAY AS NEEDED 30 tablet 0    Insulin Aspart FlexPen 100 UNIT/ML Subcutaneous Solution Pen-injector Inject 26 Units into the skin in the morning, at noon, and at bedtime. 60 mL 1    Insulin Pen Needle (BD PEN NEEDLE MICRO U/F) 32G X 6 MM Does not apply Misc USE AS DIRECTED 4 TIMES A  each 1    insulin aspart (NOVOLOG FLEXPEN) 100 Units/mL Subcutaneous Solution Pen-injector Inject 26 Units into the skin 3 (three) times daily before meals. 45 mL 1    dicyclomine 10 MG Oral Cap Take 1 capsule (10 mg total) by mouth 4 (four) times daily before meals and nightly. 120 capsule 2    Insulin Aspart, w/Niacinamide, (FIASP FLEXTOUCH) 100 UNIT/ML Subcutaneous Solution Pen-injector Inject 26 Units into the skin in the morning, at noon, and at bedtime. 60 mL 1    Continuous Blood Gluc Sensor (FREESTYLE AROLDO 2 SENSOR) Does not apply Misc 1 each every 14 (fourteen) days. 6 each 0    Insulin Syringe-Needle U-100 27G X 1/2\" 0.5 ML Does not apply Misc Use with insulin as directed 100 each 0    Accu-Chek Softclix Lancets Does not apply Misc Check blood glucose 3 times daily 300 each 3    Lancets Does not apply Misc Accu check softclix lancets - check glucose up to 3 times per day. Dx E11.9 200 each 4    Glucose Blood (ACCU-CHEK ELANIE PLUS) In Vitro Strip Use 1 strip as directed three times daily 300 strip 3    ondansetron (ZOFRAN) 4 mg tablet Take 1 tablet (4 mg total) by mouth every 8 (eight) hours as needed for Nausea. 20 tablet 1    Insulin Pen Needle (PEN NEEDLES) 32G X 4 MM Does not apply Misc 1 each every 7 days. 30 each 0    atorvastatin 40 MG Oral Tab       DICLOFENAC SODIUM 1 % Transdermal Gel APPLY 2 GRAMS TO AFFECTED AREA 4 TIMES A  g 0    Lancets Does not apply Misc Test blood sugar 3 times daily. 300 each 3    VITAMIN D3 SUPER STRENGTH 2000 UNITS Oral Tab TAKE ONE TABLET BY MOUTH ONE TIME DAILY  30 tablet 11    Vitamin B-12 (VITAMIN B12) 1000 MCG Oral Tab Take 1 tablet by mouth once  daily. 30 tablet 2    Ferrous Sulfate 325 (65 Fe) MG Oral Tab Take 1 tablet (325 mg total) by mouth daily with breakfast.       Allergies:  Allergies   Allergen Reactions    Codeine NAUSEA AND VOMITING     Abdominal pain    Hydrocodone NAUSEA ONLY    Morphine NAUSEA AND VOMITING     Abdominal pain          Physical Exam:       Physical Exam  Blood pressure 146/75, pulse 79, temperature 98.5 °F (36.9 °C), temperature source Tympanic, height 5' 1\" (1.549 m), weight 216 lb (98 kg), not currently breastfeeding.  Vitals:    05/19/25 1322   BP: 146/75   Pulse: 79   Temp: 98.5 °F (36.9 °C)   TempSrc: Tympanic   Weight: 216 lb (98 kg)   Height: 5' 1\" (1.549 m)          Physical Exam   Constitutional: Patient is oriented to person, place, and time. Patient appears well-developed and well-nourished. No distress.   HENT:   Head: Normocephalic and atraumatic.   Neurological: Patient is alert and oriented to person, place, and time.   Skin: Skin is warm and dry.  No pallor or diaphoresis.  Psychiatric: Patient has a normal mood and affect.       Nursing note and vitals reviewed.      Physical Exam  MUSCULOSKELETAL: Mild swelling of the right third finger at the PIP joint but otherwise good flexion and extension.  Not very tender.  There is no redness.. Full fist formation achieved. Positive Tinel's sign indicating carpal tunnel syndrome.  Radial pulses 2+.  She has brisk capillary refill.      Assessment/Plan:        Diagnoses and all orders for this visit:    Numbness of right hand  Patient Instructions   Get a medium or large sized Velcro wrist brace for the right wrist and wear this at bedtime for the next 4 to 6 weeks.  I also provided her with a printout of a Velcro wrist brace on [employer].  Swelling of right middle finger  -     XR FINGER(S) (MIN 2 VIEWS), RIGHT 3RD (CPT=73140); Future  Will x-ray of the right third finger although I think this is more arthritic and she denies any injury that would have caused a fracture in  my opinion.  She has been gardening quite a bit and trying to stay as active as possible.    Patient of Dr. Shah's and we will follow-up with her in the future.  Referrals (if applicable)  No orders of the defined types were placed in this encounter.        Follow up if symptoms persist.  Take medicine (if given) as prescribed.  Approach to treatment discussed and patient/family member understands and agrees to plan.     No follow-ups on file.      Assessment & Plan  Carpal tunnel syndrome  Carpal tunnel syndrome confirmed with positive Tinel's sign. Symptoms include numbness and tingling in the first through third fingers of the right hand, exacerbated by activities such as gardening. Symptoms are consistent with compression of the median nerve, likely due to repetitive hand movements and positioning during sleep. No prior diagnosis of carpal tunnel syndrome.  - Recommend wearing a Velcro wrist brace on the right wrist at bedtime for 4 to 6 weeks. Suggest a medium or large size.  - Advise obtaining a wrist brace from [employer] or a local store.  - Instruct to continue activities like gardening if wearing the brace, as it allows finger movement.    Osteoarthritis  Mild swelling of the right middle finger, likely due to osteoarthritis. Osteoarthritis may contribute to the swelling and discomfort in the finger. No recent injury reported, but increased activity such as gardening may exacerbate symptoms.  - Order x-ray of the right middle finger to assess for any structural changes or abnormalities.    Jt Manuel,   5/19/2025

## 2025-05-19 NOTE — PATIENT INSTRUCTIONS
Get a medium or large sized Velcro wrist brace for the right wrist and wear this at bedtime for the next 4 to 6 weeks.

## 2025-05-19 NOTE — TELEPHONE ENCOUNTER
Please reply to pool: EM RN TRIAGE  Action Requested: Summary for Provider     []  Critical Lab, Recommendations Needed  [] Need Additional Advice  [x]   FYI    []   Need Orders  [] Need Medications Sent to Pharmacy  []  Other     SUMMARY: Patient contacts clinic reporting numbness in her right had that began over the weekend.  Middle finger is swollen.  Denies arm symptoms.  Denies chest pain or neck pain.  Pins and needles.  Feels cold.  Acute visit booked today.     Reason for call: Numbness  Onset: Data Unavailable                       Reason for Disposition   Numbness (i.e., loss of sensation) of new-onset in hand or fingers  (Exception: Slight tingling; numbness present > 2 weeks.)    Protocols used: Hand and Wrist Pain-A-OH

## 2025-05-23 NOTE — TELEPHONE ENCOUNTER
Endocrine refill protocol for basal insulins     Protocol Criteria: PASSED     If all below requirements are met, send a 90-day supply with 1 refill per provider protocol.       Verify Appointment with Endocrinology completed in the last 6 months or scheduled in the next 3 months.  Verify A1C has been completed within the last 6 months and is below 8.5%     Last completed office visit:3/26/2025 Lulu Schofield MD     Next scheduled Follow up:   Future Appointments   Date Time Provider Department Center   7/30/2025  1:30 PM Lulu Schofield MD [facility]      Last A1c result: Last A1c value was 7.3% done 3/26/2025.

## 2025-05-27 RX ORDER — INSULIN DEGLUDEC 200 U/ML
68 INJECTION, SOLUTION SUBCUTANEOUS NIGHTLY
Qty: 30.6 ML | Refills: 0 | Status: SHIPPED | OUTPATIENT
Start: 2025-05-27 | End: 2025-06-02

## 2025-05-29 DIAGNOSIS — G44.209 TENSION HEADACHE: ICD-10-CM

## 2025-05-29 DIAGNOSIS — M79.10 MUSCLE TENSION PAIN: ICD-10-CM

## 2025-05-29 DIAGNOSIS — M54.2 NECK PAIN: ICD-10-CM

## 2025-05-29 RX ORDER — CYCLOBENZAPRINE HCL 5 MG
5 TABLET ORAL NIGHTLY PRN
Qty: 15 TABLET | Refills: 0 | Status: SHIPPED | OUTPATIENT
Start: 2025-05-29

## 2025-05-29 NOTE — TELEPHONE ENCOUNTER
Please review.  Protocol Failed.    Recent fill : 5/1/25 #15  New prescription written: 5/1/25  Last office visit: 5/1/2025    Requested Prescriptions   Pending Prescriptions Disp Refills    CYCLOBENZAPRINE 10 MG Oral Tab [Pharmacy Med Name: CYCLOBENZAPRINE 10 MG TABLET] 15 tablet 0     Sig: TAKE 1/2 TABLET (5 MG TOTAL) BY MOUTH NIGHTLY AS NEEDED FOR MUSCLE SPASMS.       There is no refill protocol information for this order

## 2025-06-02 ENCOUNTER — TELEPHONE (OUTPATIENT)
Dept: ENDOCRINOLOGY CLINIC | Facility: CLINIC | Age: 70
End: 2025-06-02

## 2025-06-02 RX ORDER — INSULIN DEGLUDEC 100 U/ML
68 INJECTION, SOLUTION SUBCUTANEOUS NIGHTLY
Qty: 33 ML | Refills: 3 | Status: SHIPPED | OUTPATIENT
Start: 2025-06-02

## 2025-06-02 NOTE — TELEPHONE ENCOUNTER
DRUG: TRESIBA FLEXTOUCH 200 UNIT/ML  SIG: INJECT 68 UNITS INTO THE SKIN NIGHTLY  QTY:27.0  PHARMACY COMMENTS: ALTERNATIVE REQUESTED: NOT COVERED PLEASE SEND ALTERATIVE

## 2025-07-30 ENCOUNTER — OFFICE VISIT (OUTPATIENT)
Dept: ENDOCRINOLOGY CLINIC | Facility: CLINIC | Age: 70
End: 2025-07-30
Payer: MEDICARE

## 2025-07-30 VITALS
SYSTOLIC BLOOD PRESSURE: 130 MMHG | BODY MASS INDEX: 41 KG/M2 | WEIGHT: 218 LBS | HEART RATE: 64 BPM | DIASTOLIC BLOOD PRESSURE: 77 MMHG

## 2025-07-30 DIAGNOSIS — E11.65 UNCONTROLLED TYPE 2 DIABETES MELLITUS WITH HYPERGLYCEMIA (HCC): Primary | ICD-10-CM

## 2025-07-30 LAB
GLUCOSE BLOOD: 136
HEMOGLOBIN A1C: 7 % (ref 4.3–5.6)

## 2025-07-30 PROCEDURE — 82947 ASSAY GLUCOSE BLOOD QUANT: CPT | Performed by: INTERNAL MEDICINE

## 2025-07-30 PROCEDURE — 95251 CONT GLUC MNTR ANALYSIS I&R: CPT | Performed by: INTERNAL MEDICINE

## 2025-07-30 PROCEDURE — 83036 HEMOGLOBIN GLYCOSYLATED A1C: CPT | Performed by: INTERNAL MEDICINE

## 2025-07-30 PROCEDURE — 99213 OFFICE O/P EST LOW 20 MIN: CPT | Performed by: INTERNAL MEDICINE

## 2025-07-30 RX ORDER — INSULIN ASPART 100 [IU]/ML
26 INJECTION, SOLUTION INTRAVENOUS; SUBCUTANEOUS 3 TIMES DAILY
Qty: 60 ML | Refills: 1 | Status: SHIPPED | OUTPATIENT
Start: 2025-07-30

## 2025-07-30 RX ORDER — INSULIN ASPART 100 [IU]/ML
26 INJECTION, SOLUTION INTRAVENOUS; SUBCUTANEOUS 3 TIMES DAILY
Qty: 60 ML | Refills: 1 | Status: CANCELLED | OUTPATIENT
Start: 2025-07-30

## 2025-08-05 RX ORDER — PEN NEEDLE, DIABETIC 32 GX 1/4"
1 NEEDLE, DISPOSABLE MISCELLANEOUS 4 TIMES DAILY
Qty: 400 EACH | Refills: 1 | Status: SHIPPED | OUTPATIENT
Start: 2025-08-05

## 2025-08-12 ENCOUNTER — LAB ENCOUNTER (OUTPATIENT)
Dept: LAB | Age: 70
End: 2025-08-12
Attending: FAMILY MEDICINE

## 2025-08-12 ENCOUNTER — OFFICE VISIT (OUTPATIENT)
Dept: FAMILY MEDICINE CLINIC | Facility: CLINIC | Age: 70
End: 2025-08-12

## 2025-08-12 VITALS
HEIGHT: 61 IN | DIASTOLIC BLOOD PRESSURE: 75 MMHG | HEART RATE: 71 BPM | SYSTOLIC BLOOD PRESSURE: 124 MMHG | BODY MASS INDEX: 41.35 KG/M2 | WEIGHT: 219 LBS

## 2025-08-12 DIAGNOSIS — M25.442 FINGER JOINT SWELLING, LEFT: ICD-10-CM

## 2025-08-12 DIAGNOSIS — Z79.4 TYPE 2 DIABETES MELLITUS WITHOUT COMPLICATION, WITH LONG-TERM CURRENT USE OF INSULIN (HCC): ICD-10-CM

## 2025-08-12 DIAGNOSIS — E66.01 MORBID (SEVERE) OBESITY DUE TO EXCESS CALORIES (HCC): ICD-10-CM

## 2025-08-12 DIAGNOSIS — E11.3291 MILD NONPROLIFERATIVE DIABETIC RETINOPATHY OF RIGHT EYE WITHOUT MACULAR EDEMA ASSOCIATED WITH TYPE 2 DIABETES MELLITUS (HCC): ICD-10-CM

## 2025-08-12 DIAGNOSIS — E78.2 MIXED HYPERLIPIDEMIA: ICD-10-CM

## 2025-08-12 DIAGNOSIS — E11.9 TYPE 2 DIABETES MELLITUS WITHOUT COMPLICATION, WITH LONG-TERM CURRENT USE OF INSULIN (HCC): ICD-10-CM

## 2025-08-12 DIAGNOSIS — I10 ESSENTIAL HYPERTENSION, BENIGN: Primary | ICD-10-CM

## 2025-08-12 LAB — URATE SERPL-MCNC: 4.7 MG/DL (ref 3.1–7.8)

## 2025-08-12 PROCEDURE — 36415 COLL VENOUS BLD VENIPUNCTURE: CPT

## 2025-08-12 PROCEDURE — 99214 OFFICE O/P EST MOD 30 MIN: CPT | Performed by: FAMILY MEDICINE

## 2025-08-12 PROCEDURE — 84550 ASSAY OF BLOOD/URIC ACID: CPT

## 2025-08-20 ENCOUNTER — OFFICE VISIT (OUTPATIENT)
Dept: ORTHOPEDICS CLINIC | Facility: CLINIC | Age: 70
End: 2025-08-20

## 2025-08-20 DIAGNOSIS — M19.049 ARTHRITIS OF FINGER: Primary | ICD-10-CM

## 2025-08-20 PROCEDURE — 99203 OFFICE O/P NEW LOW 30 MIN: CPT | Performed by: PHYSICIAN ASSISTANT

## (undated) DIAGNOSIS — E11.9 TYPE 2 DIABETES MELLITUS WITHOUT COMPLICATION, WITH LONG-TERM CURRENT USE OF INSULIN (HCC): Primary | ICD-10-CM

## (undated) DIAGNOSIS — R11.0 NAUSEA: Primary | ICD-10-CM

## (undated) DIAGNOSIS — Z79.4 TYPE 2 DIABETES MELLITUS WITHOUT COMPLICATION, WITH LONG-TERM CURRENT USE OF INSULIN (HCC): Primary | ICD-10-CM

## (undated) DEVICE — STERILE LATEX POWDER-FREE SURGICAL GLOVESWITH NITRILE COATING: Brand: PROTEXIS

## (undated) DEVICE — 3M™ STERI-STRIP™ COMPOUND BENZOIN TINCTURE 40 BAGS/CARTON 4 CARTONS/CASE C1544: Brand: 3M™ STERI-STRIP™

## (undated) DEVICE — SUTURE VICRYL 0 J906G

## (undated) DEVICE — Device

## (undated) DEVICE — TROCAR: Brand: KII SHIELDED BLADED ACCESS SYSTEM

## (undated) DEVICE — UNDYED BRAIDED (POLYGLACTIN 910), SYNTHETIC ABSORBABLE SUTURE: Brand: COATED VICRYL

## (undated) DEVICE — LAP LIGASURE 5MM BLUNT

## (undated) DEVICE — LAPAROSCOPY: Brand: MEDLINE INDUSTRIES, INC.

## (undated) DEVICE — ENDOSCOPY PACK UPPER: Brand: MEDLINE INDUSTRIES, INC.

## (undated) DEVICE — 60 ML SYRINGE LUER-LOCK TIP: Brand: MONOJECT

## (undated) DEVICE — TRAY FOLEY BDX 16F STATLOCK

## (undated) DEVICE — LUBRICANT JLY SURGILUBE 2OZ

## (undated) DEVICE — SUTURE VICRYL 2-0 CT-2

## (undated) DEVICE — FORCEP RADIAL JAW 4

## (undated) DEVICE — 9534HP TRANSPARENT DRSG W/FRAME: Brand: 3M™ TEGADERM™

## (undated) DEVICE — E-Z CLEAN, NON-STICK, PTFE COATED, ELECTROSURGICAL BLADE ELECTRODE, MODIFIED EXTENDED INSULATION, 2.5 INCH (6.35 CM): Brand: MEGADYNE

## (undated) DEVICE — 3M™ STERI-STRIP™ REINFORCED ADHESIVE SKIN CLOSURES, R1547, 1/2 IN X 4 IN (12 MM X 100 MM), 6 STRIPS/ENVELOPE: Brand: 3M™ STERI-STRIP™

## (undated) DEVICE — TROCAR: Brand: KII FIOS FIRST ENTRY

## (undated) DEVICE — X-STREAM LAPAROSCOPIC IRRIGATION TUBING SET WITH NEZHAT-DORSEY TRUMPET VALVE, AND COJOINED SUCTION/IRRIGATION TUBING, WITHOUT PROBE TIP: Brand: X-STREAM

## (undated) DEVICE — INSUFFLATION NEEDLE TO ESTABLISH PNEUMOPERITONEUM.: Brand: INSUFFLATION NEEDLE

## (undated) DEVICE — TISSUE RETRIEVAL SYSTEM: Brand: INZII RETRIEVAL SYSTEM

## (undated) DEVICE — TROCAR: Brand: KII SLEEVE

## (undated) DEVICE — SUTURE VICRYL 0 UR-6

## (undated) DEVICE — SOL  .9 3000ML

## (undated) DEVICE — SOL  .9 1000ML BTL

## (undated) DEVICE — SUTURE VICRYL 3-0 J442H

## (undated) DEVICE — ENDOSCOPY PACK - LOWER: Brand: MEDLINE INDUSTRIES, INC.

## (undated) DEVICE — 3M™ STERI-DRAPE™ INSTRUMENT POUCH 1018L: Brand: STERI-DRAPE™

## (undated) DEVICE — Device: Brand: DEFENDO AIR/WATER/SUCTION AND BIOPSY VALVE

## (undated) DEVICE — DEVICE PORT SITE CLOSURE

## (undated) DEVICE — [HIGH FLOW INSUFFLATOR,  DO NOT USE IF PACKAGE IS DAMAGED,  KEEP DRY,  KEEP AWAY FROM SUNLIGHT,  PROTECT FROM HEAT AND RADIOACTIVE SOURCES.]: Brand: PNEUMOSURE

## (undated) DEVICE — LAPAROTOMY: Brand: MEDLINE INDUSTRIES, INC.

## (undated) DEVICE — 3M™ TEGADERM™ TRANSPARENT FILM DRESSING, 1626W, 4 IN X 4-3/4 IN (10 CM X 12 CM), 50 EACH/CARTON, 4 CARTON/CASE: Brand: 3M™ TEGADERM™

## (undated) DEVICE — DECANTER SPIKE TRANSFLOW

## (undated) NOTE — LETTER
AUTHORIZATION FOR SURGICAL OPERATION OR OTHER PROCEDURE    1. I hereby authorize Dr. Herber Avilez, Carolann Thao, and [facility] staff assigned to my case to perform the following operation and/or procedure at the [facility], [facility]:    _______________________________________________________________________________________________    Cortisone Injection in Right knee  _______________________________________________________________________________________________    2. My physician has explained the nature and purpose of the operation or other procedure, possible alternative methods of treatment, the risks involved, and the possibility of complication to me. I acknowledge that no guarantee has been made as to the result that may be obtained. 3.  I recognize that, during the course of this operation, or other procedure, unforseen conditions may necessitate additional or different procedure than those listed above. I, therefore, further authorize and request that the above named physician, his/her physician assistants or designees perform such procedures as are, in his/her professional opinion, necessary and desirable. 4.  Any tissue or organs removed in the operation or other procedure may be disposed of by and at the discretion of the [facility], [facility] and [facility]. 5.  I understand that in the event of a medical emergency, I will be transported by local paramedics to [facility] or other hospital emergency department. 6.  I certify that I have read and fully understand the above consent to operation and/or other procedure. 7.  I acknowledge that my physician has explained sedation/analgesia administration to me including the risks and benefits. I consent to the administration of sedation/analgesia as may be necessary or desirable in the judgement of my physician.     Witness signature: ___________________________________________________ Date: ______/______/_____                    Time:  ________ A. M.  P.M. Patient Name:  ______________________________________________________  (please print)      Patient signature:  ___________________________________________________             Relationship to Patient:           []  Parent    Responsible person                          []  Spouse  In case of minor or                    [] Other  _____________   Incompetent name:  __________________________________________________                               (please print)      _____________      Responsible person  In case of minor or  Incompetent signature:  _______________________________________________    Statement of Physician  My signature below affirms that prior to the time of the procedure, I have explained to the patient and/or his/her guardian, the risks and benefits involved in the proposed treatment and any reasonable alternative to the proposed treatment. I have also explained the risks and benefits involved in the refusal of the proposed treatment and have answered the patient's questions.                         Date:  ______/______/_______  Provider                      Signature:  __________________________________________________________       Time:  ___________ A.M    P.M.

## (undated) NOTE — LETTER
[place], [address] [facility]  [address] [address],[address]  [address] [facility], [address] [facility]       03/15/18        Patient:  Enriqueta Ruiz   YOB: 1955   Date of Visit: 3/15/2018       Dear  Dr. Haile Vazquez MD,      Thank you for referring Enriqueta Ruiz to

## (undated) NOTE — LETTER
08/11/20    [place] [facility] [address] [phone]      Dear Veronique Blum records indicate that you have outstanding lab work and or testing that was ordered for you and has not yet been completed:  Orders Placed This Encounter      Ma

## (undated) NOTE — MR AVS SNAPSHOT
[address]  [address] [facility]  [phone]               Thank you for choosing us for your health care visit with Lance Mukherjee MD.  We are glad to serve you and happy to provide you with this summa If you are confident that your benefit plan will not require a referral or authorization, such as [place] Medicaid, please feel free to schedule your appointment immediately.  However, if you are unsure about the requirements for authorization, please wait TAKE ONE TABLET BY MOUTH ONE TIME DAILY WITH BREAKFAST   Commonly known as:  AMARYL           insulin aspart 100 UNIT/ML Sopn   Inject 18 Units into the skin 3 (three) times daily before meals.    What changed:  Another medication with the same name was rem MyChart     Visit [facility]  You can access your MyChart to more actively manage your health care and view more details from this visit by going to [email or web address].   If you've recently had a stay at the Hospital you can access your discharge ins track your progress   You don’t need to join a gym. Home exercises work great.  Put more priority on exercise in your life                    Visit [facility] online at  [email or web address]

## (undated) NOTE — LETTER
AUTHORIZATION FOR SURGICAL OPERATION OR OTHER PROCEDURE    1.  I hereby authorize Dr. Mychal Fitzpatrick, and [facility] staff assigned to my case to perform the following operation and/or procedure at the [facility]:    ___Depomedrol Injection P.M.       Patient Name:  ______________________________________________________  (please print)      Patient signature:  ___________________________________________________             Relationship to Patient:           []  Parent    Responsible person

## (undated) NOTE — LETTER
01/02/19        [address]      Dear Basilia Hurd records indicate that you have outstanding lab work and or testing that was ordered for you and has not yet been completed:  Orders Placed This Encounter      P

## (undated) NOTE — LETTER
11/27/20        [place] [facility] [address] [phone]      Dear Juan M Brush records indicate that you have outstanding lab work and or testing that was ordered for you and has not yet been completed:  Orders Placed This Encounter

## (undated) NOTE — LETTER
12/15/20        [place] [facility] [address] [phone]      Dear Basilia Hurd records indicate that you have outstanding lab work and or testing that was ordered for you and has not yet been completed:  Orders Placed This Encounter

## (undated) NOTE — LETTER
10/12/20        [place] [facility] [address] [phone]      Dear Mike Kang records indicate that you have outstanding lab work and or testing that was ordered for you and has not yet been completed:  Orders Placed This Encounter

## (undated) NOTE — LETTER
03/15/21        [place] [facility] [address] [phone]      Dear Mohinder Mckenna records indicate that you have outstanding lab work and or testing that was ordered for you and has not yet been completed:  Orders Placed This Encounter

## (undated) NOTE — LETTER
AUTHORIZATION FOR SURGICAL OPERATION OR OTHER PROCEDURE    1.  I hereby authorize Dr. Cristopher Arias, and [facility] staff assigned to my case to perform the following operation and/or procedure at the [facility]:    ________________________ Time:  ________ A. M.  P.M.        Patient Name:  ______________________________________________________  (please print)      Patient signature:  ___________________________________________________             Relationship to Patient:

## (undated) NOTE — LETTER
November 23, 2021    [place]le    Dear Ross Chen:  It was a pleasure speaking with you over the phone recently.  To follow up, I wanted to send you some contact information to utilize when you have a quest

## (undated) NOTE — LETTER
INSTRUCTIONS FOR PRE-SURGICAL   ANTIMICROBIAL BATH/SHOWER    Your doctor has recommended a pre-surgical CHG (chlorhexidine gluconate) shower/bath with Betasept (also sold as Hibiclens). It reduces bacteria that can potentially cause infection.   Betasept Keep out of reach of children. If swallowed get medica help or contact [employer]. Store between 60-80 degrees F. [employer] Warning! CHG WILL STAIN YOUR FABRICS! Use with care around shower curtains, towels washcloths rugs and clothes.   Wipe

## (undated) NOTE — LETTER
MAJOR CASE PREOPERATIVE INSTRUCTIONS    10/30/2017      Dear Makayla Castro,    Your are having a bilateral salpingo oophorectomy on 11/4/17 at 9:15am.    Do not eat or drink anything (including water) after midnight the night before surgery.   Only take in clear liq

## (undated) NOTE — LETTER
01/29/20        [facility] [address] [phone]      Dear Amber Lawrence records indicate that you have outstanding lab work and or testing that was ordered for you and has not yet been completed:  Orders Placed This Encounter

## (undated) NOTE — LETTER
04/29/20        [place] [facility] [address] [phone]      Dear Valerie Rizo records indicate that you have outstanding lab work and or testing that was ordered for you and has not yet been completed:  Orders Placed This Encounter